# Patient Record
Sex: FEMALE | Race: WHITE | NOT HISPANIC OR LATINO | Employment: UNEMPLOYED | URBAN - METROPOLITAN AREA
[De-identification: names, ages, dates, MRNs, and addresses within clinical notes are randomized per-mention and may not be internally consistent; named-entity substitution may affect disease eponyms.]

---

## 2017-04-08 ENCOUNTER — HOSPITAL ENCOUNTER (EMERGENCY)
Facility: HOSPITAL | Age: 57
Discharge: HOME | End: 2017-04-08
Attending: EMERGENCY MEDICINE
Payer: COMMERCIAL

## 2017-04-08 VITALS
BODY MASS INDEX: 42.04 KG/M2 | HEART RATE: 98 BPM | SYSTOLIC BLOOD PRESSURE: 134 MMHG | TEMPERATURE: 98.6 F | RESPIRATION RATE: 18 BRPM | OXYGEN SATURATION: 96 % | DIASTOLIC BLOOD PRESSURE: 91 MMHG | WEIGHT: 222.66 LBS | HEIGHT: 61 IN

## 2017-04-08 DIAGNOSIS — L73.9 FOLLICULITIS: Primary | ICD-10-CM

## 2017-04-08 PROCEDURE — A9270 NON-COVERED ITEM OR SERVICE: HCPCS | Performed by: PHYSICIAN ASSISTANT

## 2017-04-08 PROCEDURE — 99282 EMERGENCY DEPT VISIT SF MDM: CPT

## 2017-04-08 RX ORDER — CEPHALEXIN 500 MG/1
500 CAPSULE ORAL 4 TIMES DAILY
Qty: 40 CAPSULE | Refills: 0 | Status: SHIPPED | OUTPATIENT
Start: 2017-04-08 | End: 2017-04-18

## 2017-04-08 RX ORDER — CEPHALEXIN 500 MG/1
500 CAPSULE ORAL ONCE
Status: COMPLETED | OUTPATIENT
Start: 2017-04-08 | End: 2017-04-08

## 2017-04-08 RX ADMIN — CEPHALEXIN 500 MG: 500 CAPSULE ORAL at 09:31

## 2017-06-21 ENCOUNTER — ALLSCRIPTS OFFICE VISIT (OUTPATIENT)
Dept: OTHER | Facility: OTHER | Age: 57
End: 2017-06-21

## 2017-06-21 DIAGNOSIS — M12.812 OTHER SPECIFIC ARTHROPATHIES, NOT ELSEWHERE CLASSIFIED, LEFT SHOULDER: ICD-10-CM

## 2017-06-21 DIAGNOSIS — M25.512 PAIN IN LEFT SHOULDER: ICD-10-CM

## 2017-06-28 ENCOUNTER — TRANSCRIBE ORDERS (OUTPATIENT)
Dept: MRI IMAGING | Facility: CLINIC | Age: 57
End: 2017-06-28

## 2017-06-28 ENCOUNTER — APPOINTMENT (OUTPATIENT)
Dept: RADIOLOGY | Facility: CLINIC | Age: 57
End: 2017-06-28
Payer: OTHER MISCELLANEOUS

## 2017-06-28 DIAGNOSIS — M12.812 OTHER SPECIFIC ARTHROPATHIES, NOT ELSEWHERE CLASSIFIED, LEFT SHOULDER: ICD-10-CM

## 2017-06-28 DIAGNOSIS — M25.512 PAIN IN LEFT SHOULDER: ICD-10-CM

## 2017-06-28 PROCEDURE — 73030 X-RAY EXAM OF SHOULDER: CPT

## 2018-01-14 VITALS
DIASTOLIC BLOOD PRESSURE: 92 MMHG | BODY MASS INDEX: 40.67 KG/M2 | SYSTOLIC BLOOD PRESSURE: 134 MMHG | HEART RATE: 101 BPM | HEIGHT: 62 IN | WEIGHT: 221 LBS | OXYGEN SATURATION: 97 %

## 2018-09-12 ENCOUNTER — TRANSCRIBE ORDERS (OUTPATIENT)
Dept: CT IMAGING | Facility: CLINIC | Age: 58
End: 2018-09-12

## 2018-09-12 ENCOUNTER — APPOINTMENT (OUTPATIENT)
Dept: LAB | Facility: CLINIC | Age: 58
End: 2018-09-12
Payer: COMMERCIAL

## 2018-09-12 DIAGNOSIS — M13.89 ALLERGIC ARTHRITIS, MULTIPLE SITES: Primary | ICD-10-CM

## 2018-09-12 LAB
ALBUMIN SERPL BCP-MCNC: 3.4 G/DL (ref 3.5–5)
ALP SERPL-CCNC: 147 U/L (ref 46–116)
ALT SERPL W P-5'-P-CCNC: 16 U/L (ref 12–78)
ANION GAP SERPL CALCULATED.3IONS-SCNC: 4 MMOL/L (ref 4–13)
AST SERPL W P-5'-P-CCNC: 12 U/L (ref 5–45)
BACTERIA UR QL AUTO: ABNORMAL /HPF
BILIRUB SERPL-MCNC: 0.41 MG/DL (ref 0.2–1)
BILIRUB UR QL STRIP: NEGATIVE
BUN SERPL-MCNC: 10 MG/DL (ref 5–25)
CALCIUM SERPL-MCNC: 9.2 MG/DL (ref 8.3–10.1)
CHLORIDE SERPL-SCNC: 106 MMOL/L (ref 100–108)
CHOLEST SERPL-MCNC: 195 MG/DL (ref 50–200)
CLARITY UR: ABNORMAL
CO2 SERPL-SCNC: 28 MMOL/L (ref 21–32)
COLOR UR: ABNORMAL
CREAT SERPL-MCNC: 0.77 MG/DL (ref 0.6–1.3)
ERYTHROCYTE [DISTWIDTH] IN BLOOD BY AUTOMATED COUNT: 20.9 % (ref 11.6–15.1)
ERYTHROCYTE [SEDIMENTATION RATE] IN BLOOD: 34 MM/HOUR (ref 0–20)
GFR SERPL CREATININE-BSD FRML MDRD: 87 ML/MIN/1.73SQ M
GLUCOSE P FAST SERPL-MCNC: 101 MG/DL (ref 65–99)
GLUCOSE UR STRIP-MCNC: NEGATIVE MG/DL
HCT VFR BLD AUTO: 38.8 % (ref 34.8–46.1)
HDLC SERPL-MCNC: 53 MG/DL (ref 40–60)
HGB BLD-MCNC: 10.5 G/DL (ref 11.5–15.4)
HGB UR QL STRIP.AUTO: NEGATIVE
HYALINE CASTS #/AREA URNS LPF: ABNORMAL /LPF
KETONES UR STRIP-MCNC: NEGATIVE MG/DL
LDLC SERPL CALC-MCNC: 122 MG/DL (ref 0–100)
LEUKOCYTE ESTERASE UR QL STRIP: ABNORMAL
MCH RBC QN AUTO: 19.7 PG (ref 26.8–34.3)
MCHC RBC AUTO-ENTMCNC: 27.1 G/DL (ref 31.4–37.4)
MCV RBC AUTO: 73 FL (ref 82–98)
NITRITE UR QL STRIP: NEGATIVE
NON-SQ EPI CELLS URNS QL MICRO: ABNORMAL /HPF
NONHDLC SERPL-MCNC: 142 MG/DL
PH UR STRIP.AUTO: 5.5 [PH] (ref 4.5–8)
PLATELET # BLD AUTO: 445 THOUSANDS/UL (ref 149–390)
PMV BLD AUTO: 9.8 FL (ref 8.9–12.7)
POTASSIUM SERPL-SCNC: 4.6 MMOL/L (ref 3.5–5.3)
PROT SERPL-MCNC: 7.6 G/DL (ref 6.4–8.2)
PROT UR STRIP-MCNC: ABNORMAL MG/DL
RBC # BLD AUTO: 5.34 MILLION/UL (ref 3.81–5.12)
RBC #/AREA URNS AUTO: ABNORMAL /HPF
SODIUM SERPL-SCNC: 138 MMOL/L (ref 136–145)
SP GR UR STRIP.AUTO: 1.02 (ref 1–1.03)
TRIGL SERPL-MCNC: 98 MG/DL
UROBILINOGEN UR QL STRIP.AUTO: 0.2 E.U./DL
WBC # BLD AUTO: 9.09 THOUSAND/UL (ref 4.31–10.16)
WBC #/AREA URNS AUTO: ABNORMAL /HPF

## 2018-09-12 PROCEDURE — 80061 LIPID PANEL: CPT | Performed by: FAMILY MEDICINE

## 2018-09-12 PROCEDURE — 81001 URINALYSIS AUTO W/SCOPE: CPT | Performed by: FAMILY MEDICINE

## 2018-09-12 PROCEDURE — 36415 COLL VENOUS BLD VENIPUNCTURE: CPT | Performed by: FAMILY MEDICINE

## 2018-09-12 PROCEDURE — 86618 LYME DISEASE ANTIBODY: CPT | Performed by: FAMILY MEDICINE

## 2018-09-12 PROCEDURE — 85652 RBC SED RATE AUTOMATED: CPT | Performed by: FAMILY MEDICINE

## 2018-09-12 PROCEDURE — 85027 COMPLETE CBC AUTOMATED: CPT

## 2018-09-12 PROCEDURE — 80053 COMPREHEN METABOLIC PANEL: CPT | Performed by: FAMILY MEDICINE

## 2018-09-14 LAB
B BURGDOR IGG SER IA-ACNC: 0.04
B BURGDOR IGM SER IA-ACNC: 0.36

## 2020-08-26 ENCOUNTER — APPOINTMENT (OUTPATIENT)
Dept: RADIOLOGY | Facility: CLINIC | Age: 60
End: 2020-08-26
Payer: COMMERCIAL

## 2020-08-26 ENCOUNTER — OFFICE VISIT (OUTPATIENT)
Dept: OBGYN CLINIC | Facility: CLINIC | Age: 60
End: 2020-08-26
Payer: COMMERCIAL

## 2020-08-26 VITALS
BODY MASS INDEX: 47.01 KG/M2 | WEIGHT: 249 LBS | HEART RATE: 90 BPM | HEIGHT: 61 IN | DIASTOLIC BLOOD PRESSURE: 85 MMHG | SYSTOLIC BLOOD PRESSURE: 128 MMHG

## 2020-08-26 DIAGNOSIS — S42.291A CLOSED FRACTURE OF HEAD OF RIGHT HUMERUS, INITIAL ENCOUNTER: Primary | ICD-10-CM

## 2020-08-26 DIAGNOSIS — S42.291A CLOSED FRACTURE OF HEAD OF RIGHT HUMERUS, INITIAL ENCOUNTER: ICD-10-CM

## 2020-08-26 PROCEDURE — 73030 X-RAY EXAM OF SHOULDER: CPT

## 2020-08-26 PROCEDURE — 99203 OFFICE O/P NEW LOW 30 MIN: CPT | Performed by: FAMILY MEDICINE

## 2020-08-26 NOTE — PROGRESS NOTES
Assessment/Plan:  Assessment/Plan   Diagnoses and all orders for this visit:    Closed fracture of head of right humerus, initial encounter  -     XR shoulder 2+ vw right; Future  -     Ambulatory referral to Orthopedic Surgery; Future      71-year-old right-dominant female right shoulder pain and limited motion following fall injury at home on 08/20/2020  Discussed with patient physical exam, radiographs, impression and plan  X-rays noted for comminuted fracture of the head of the humerus and neck, with increased acromial humeral space  Physical noted for ecchymosis at the anterior aspect the upper arm  She has limited active range of motion of the shoulder due to pain  She also has limited flexion and extension of the elbow due to pain, however there is intact strength with flexion and extension of the elbow  She has normal wrist range of motion and strength, as well as normal right hand  strength  She has normal sensation throughout the upper extremity and intact radial pulse  I discussed with patient that given the finding of increased acromial humeral space it may indicate adequate glenohumeral alignment or concurrent rotator cuff tear  I will refer her to orthopedic surgeon for further evaluation and recommendation of treatment  She may continue with use of arm sling  Subjective:   Patient ID: Ramya Rutherford is a 64 y o  female  Chief Complaint   Patient presents with    Right Shoulder - Pain       71-year-old active right-hand-dominant female presents for evaluation of right shoulder pain and limited motion of onset from fall injury at home on 08/20/2020  She tripped on the stairs and fell forward landing on her right arm  She does not recall exact mechanism injury  She had pain described as sudden onset, generalized to the upper arm and shoulder, throbbing and sharp, non radiating, worse with attempt of movement, and improved with resting and supporting the arm    She presented emergency room where she was assessed to have comminuted fracture of the humeral head and neck  She was placed in shoulder sling and referred to orthopedic care  She was prescribed morphine for pain  She reports that with the arm being immobilized her pain has been tolerable and she has been taking mostly Tylenol  She denies any previous injury to the right shoulder  Shoulder Pain   This is a new problem  The current episode started in the past 7 days  The problem occurs constantly  The problem has been unchanged  Associated symptoms include arthralgias and weakness  Pertinent negatives include no abdominal pain, chest pain, chills, fever, joint swelling, numbness, rash or sore throat  Exacerbated by: Arm movement  She has tried rest, oral narcotics, immobilization and acetaminophen for the symptoms  The treatment provided mild relief  The following portions of the patient's history were reviewed and updated as appropriate: She  has a past medical history of Shoulder fracture  She  has a past surgical history that includes Cholecystectomy and Shoulder surgery (Left)  Her family history includes No Known Problems in her father and mother  She  reports that she has been smoking  She has been smoking about 0 50 packs per day  She has never used smokeless tobacco  She reports that she does not drink alcohol or use drugs  She has No Known Allergies       Review of Systems   Constitutional: Negative for chills and fever  HENT: Negative for sore throat  Eyes: Negative for visual disturbance  Respiratory: Negative for shortness of breath  Cardiovascular: Negative for chest pain  Gastrointestinal: Negative for abdominal pain  Genitourinary: Negative for difficulty urinating  Musculoskeletal: Positive for arthralgias  Negative for joint swelling  Skin: Negative for rash and wound  Neurological: Positive for weakness  Negative for numbness     Hematological: Does not bruise/bleed easily  Psychiatric/Behavioral: Negative for self-injury  Objective:  Vitals:    08/26/20 0924   BP: 128/85   Pulse: 90   Weight: 113 kg (249 lb)   Height: 5' 1" (1 549 m)     Right Hand Exam     Tenderness   The patient is experiencing no tenderness  Range of Motion   The patient has normal right wrist ROM  Muscle Strength   The patient has normal right wrist strength  Other   Sensation: normal  Pulse: present      Right Elbow Exam     Tenderness   The patient is experiencing no tenderness  Range of Motion   Right elbow extension: -60  Flexion: 100   Pronation: normal   Supination: normal     Muscle Strength   Right elbow normal strength: 5/5 strength flexion and extension  Other   Sensation: normal      Right Shoulder Exam     Other   Sensation: normal          Strength/Myotome Testing     Right Wrist/Hand   Normal wrist strength      Physical Exam  Vitals signs and nursing note reviewed  Constitutional:       General: She is not in acute distress  Appearance: She is well-developed  HENT:      Head: Normocephalic  Eyes:      Conjunctiva/sclera: Conjunctivae normal    Neck:      Trachea: No tracheal deviation  Cardiovascular:      Rate and Rhythm: Normal rate  Pulmonary:      Effort: Pulmonary effort is normal  No respiratory distress  Abdominal:      General: There is no distension  Skin:     General: Skin is warm and dry  Neurological:      Mental Status: She is alert and oriented to person, place, and time  Psychiatric:         Behavior: Behavior normal          I have personally reviewed pertinent films in PACS and my interpretation is Comminuted humeral head and neck fracture with increased acromial humeral space

## 2020-08-27 DIAGNOSIS — S42.291A CLOSED FRACTURE OF HEAD OF RIGHT HUMERUS, INITIAL ENCOUNTER: Primary | ICD-10-CM

## 2020-09-08 ENCOUNTER — APPOINTMENT (OUTPATIENT)
Dept: RADIOLOGY | Facility: CLINIC | Age: 60
End: 2020-09-08
Payer: COMMERCIAL

## 2020-09-08 ENCOUNTER — OFFICE VISIT (OUTPATIENT)
Dept: OBGYN CLINIC | Facility: CLINIC | Age: 60
End: 2020-09-08
Payer: COMMERCIAL

## 2020-09-08 VITALS — HEIGHT: 61 IN | WEIGHT: 249 LBS | BODY MASS INDEX: 47.01 KG/M2

## 2020-09-08 DIAGNOSIS — M25.511 ACUTE PAIN OF RIGHT SHOULDER: ICD-10-CM

## 2020-09-08 DIAGNOSIS — S42.291A CLOSED FRACTURE OF HEAD OF RIGHT HUMERUS, INITIAL ENCOUNTER: Primary | ICD-10-CM

## 2020-09-08 PROCEDURE — 23600 CLTX PROX HUMRL FX W/O MNPJ: CPT | Performed by: ORTHOPAEDIC SURGERY

## 2020-09-08 PROCEDURE — 99213 OFFICE O/P EST LOW 20 MIN: CPT | Performed by: ORTHOPAEDIC SURGERY

## 2020-09-08 PROCEDURE — 73030 X-RAY EXAM OF SHOULDER: CPT

## 2020-09-08 NOTE — PROGRESS NOTES
Orthopaedics Office Visit - New Patient Visit    ASSESSMENT/PLAN:    Assessment:   Right comminuted humeral head and neck fracture sustained 8/20/20    Plan:     Imaging reviewed today shows stable comminuted humeral head and neck fracture with improved alignment, callus formation  She will continue in sling, she can come out for pendulum exercises, elbow motion, she is NWB upper extremity  She will also start PT active assisted motion, passive motion exercises as shown by PT  She can continue with tylenol for pain control  See in 3 weeks with imaging, may then advance to active motion  _____________________________________________________  CHIEF COMPLAINT:  No chief complaint on file  SUBJECTIVE:  Angelo Ortega is a 64 y o  female who presents for evaluation of her right shoulder referred by Dr Lan Rubio  Injury fall at home 8/20/20 resulting in comminuted fracture of humeral head and neck  Seen initially by ED following injury and given sling, pain medication  She has been sling at all times  Really has no pain at rest  Ecchymosis in shoulder has resolved  Denies any numbness or tingling in arm  Use tylenol for pain as needed       PAST MEDICAL HISTORY:  Past Medical History:   Diagnosis Date    Shoulder fracture        PAST SURGICAL HISTORY:  Past Surgical History:   Procedure Laterality Date    CHOLECYSTECTOMY      SHOULDER SURGERY Left        FAMILY HISTORY:  Family History   Problem Relation Age of Onset    No Known Problems Mother     No Known Problems Father        SOCIAL HISTORY:  Social History     Tobacco Use    Smoking status: Current Every Day Smoker     Packs/day: 0 50    Smokeless tobacco: Never Used   Substance Use Topics    Alcohol use: No    Drug use: No       MEDICATIONS:    Current Outpatient Medications:     acetaminophen (TYLENOL) 325 mg tablet, Take 975 mg by mouth every 6 (six) hours as needed for mild pain (last dose 8pm last night), Disp: , Rfl:    naproxen (NAPROSYN) 375 mg tablet, Take 1 tablet by mouth 2 (two) times a day with meals, Disp: 20 tablet, Rfl: 0    VITAMIN D, CHOLECALCIFEROL, PO, Take 1 tablet by mouth once a week, Disp: , Rfl:     ALLERGIES:  No Known Allergies    REVIEW OF SYSTEMS:  MSK: right arm  Neuro: normal  Pertinent items are otherwise noted in HPI  A comprehensive review of systems was otherwise negative  LABS:  HgA1c: No results found for: HGBA1C  BMP:   Lab Results   Component Value Date    CALCIUM 9 2 09/12/2018    K 4 6 09/12/2018    CO2 28 09/12/2018     09/12/2018    BUN 10 09/12/2018    CREATININE 0 77 09/12/2018     CBC: No components found for: CBC    _____________________________________________________  PHYSICAL EXAMINATION:  Vital signs: There were no vitals taken for this visit  General: No acute distress, awake and alert  Psychiatric: Mood and affect appear appropriate  HEENT: Trachea Midline, No torticollis, no apparent facial trauma  Cardiovascular: No audible murmurs; Extremities appear perfused  Pulmonary: No audible wheezing or stridor  Skin: No open lesions; see further details (if any) below    MUSCULOSKELETAL EXAMINATION:  Extremities:  RUE:    Presents in sling right arm  No ecchymosis, skin intact  Range of motion not assessed due to fracture  Mild pain in right elbow with stiffness due to sling  Full range of motion of wrist and all fingers  Sensation intact and symmetric to light palpation  Ulnar, radial, axillary nerves intact   Right upper extremity well perfused           _____________________________________________________  STUDIES REVIEWED:  X-ray right shoulder demonstrate comminuted humeral head and neck fracture with improved alignment from previous imaging, callus formation         PROCEDURES PERFORMED:  Fracture / Dislocation Treatment    Date/Time: 9/8/2020 1:05 PM  Performed by: Magali Forrester MD  Authorized by: Magali Forrester MD     Patient Location:  Clinic  Verbal consent obtained?: Yes    Consent given by:  Patient  Patient identity confirmed:  Verbally with patient  Injury location:  Shoulder  Location details:  Right shoulder  Injury type:  Fracture  Fracture type: surgical neck    Neurovascular status: Neurovascularly intact    Manipulation performed?: No    Immobilization:  Sling  Neurovascular status: Neurovascularly intact          Ivet Cedeno

## 2020-09-08 NOTE — LETTER
September 8, 2020     [facility], DO  [address]    Patient: Derik Epps   YOB: 1960   Date of Visit: 9/8/2020       Dear Dr Alfredito Burrell: Thank you for referring Derik Epps to me for evaluation  Below are my notes for this consultation  If you have questions, please do not hesitate to call me  I look forward to following your patient along with you           Sincerely,        Zoltan Willingham MD        CC: No Recipients

## 2020-09-08 NOTE — PATIENT INSTRUCTIONS
Nonweightbearing right upper extremity in sling  Call PT for right shoulder range of motion  Follow up in 3 weeks

## 2020-09-16 ENCOUNTER — EVALUATION (OUTPATIENT)
Dept: PHYSICAL THERAPY | Facility: CLINIC | Age: 60
End: 2020-09-16
Payer: COMMERCIAL

## 2020-09-16 DIAGNOSIS — S42.291D CLOSED FRACTURE OF HEAD OF RIGHT HUMERUS WITH ROUTINE HEALING, SUBSEQUENT ENCOUNTER: ICD-10-CM

## 2020-09-16 PROCEDURE — 97110 THERAPEUTIC EXERCISES: CPT | Performed by: PHYSICAL THERAPIST

## 2020-09-16 PROCEDURE — 97162 PT EVAL MOD COMPLEX 30 MIN: CPT | Performed by: PHYSICAL THERAPIST

## 2020-09-16 NOTE — PROGRESS NOTES
PT Evaluation     Today's date: 2020  Patient name: Emir Rodriguez  : 1960  MRN: [id number]  Referring provider: Jacque Abrams MD  Dx:   Encounter Diagnosis     ICD-10-CM    1  Closed fracture of head of right humerus, initial encounter  S42 291A Ambulatory referral to Physical Therapy                  Assessment  Assessment details: Patient is a 65 y/o female s/p right humerus fracture on 20  Patient has not had surgery and her xray shows healing and callus formation  Patient presents with decreased functional mobility due to increased pain, decreased shoulder and elbow strength, decreased shoulder and elbow ROM, and use of sling associated with humerus fracture  Patient will benefit from skilled physical therapy to address impairment and improve functional mobility  PT needed to allow for return to maximal function and improve quality of life  Impairments: abnormal or restricted ROM, activity intolerance, impaired physical strength, lacks appropriate home exercise program and pain with function  Understanding of Dx/Px/POC: good   Prognosis: good    Goals  STG within 4 weeks:   1  Patient to be independent in HEP  2  Reduce pain by 50% to improve quality of life  3  Improve PROM elevation to 90 degrees within 2 weeks  4  Improve elbow flexion/extension to 140/0 degrees within 4 weeks  LTG within 8 weeks:   1  Patient to be independent in ADLs/IADLs without difficulty  2  Improve shoulder AROM to [facility] by time of discharge  3  Improve shoulder strength to 4/4+ in all planes by time of discharge  Plan  Plan details: Per script:    Evaluate and Treat right humeral head and neck fracture  Passive motion pendulums, active assisted motion to tolerance  2x wk 6 wks    Per physician note: Nonweightbearing right upper extremity in sling  She will continue in sling, she can come out for pendulum exercises, elbow motion, she is NWB upper extremity   She will also start PT active assisted motion, passive motion exercises as shown by PT  See in 3 weeks with imaging, may then advance to active motion  Patient would benefit from: skilled physical therapy and PT eval  Planned modality interventions: cryotherapy, hydrotherapy, unattended electrical stimulation, H-Wave and TENS  Planned therapy interventions: therapeutic training, therapeutic exercise, therapeutic activities, stretching, strengthening, postural training, patient education, neuromuscular re-education, manual therapy, joint mobilization, IADL retraining, activity modification, ADL retraining, ADL training, body mechanics training, flexibility, functional ROM exercises, gait training, graded activity, graded exercise, graded motor, home exercise program and abdominal trunk stabilization  Frequency: 2x week  Duration in weeks: 8  Plan of Care beginning date: 2020  Plan of Care expiration date: 2020  Treatment plan discussed with: patient        Subjective Evaluation    History of Present Illness  Date of onset: 2020  Mechanism of injury: Patient is a 63 y/o female with chief complaints right shoulder pain s/p a fall on 20  She states she tripped on a lip of doorway and she fell forward hitting the wall  She was seen at [facility] ED and then referred to ortho  She was diagnosed with right humerus fracture  She was referred from Dr Sky Chairez to ortho surgeon  Repeat xrays showed healing  She arrives today in sling  She is referred for evaluation and treatment of RUE     Pain  Current pain ratin  At best pain ratin  At worst pain ratin  Quality: needle-like (tightness)  Relieving factors: ice and medications  Progression: improved    Social Support  Steps to enter house: yes  Lives in: multiple-level home  Lives with: spouse and adult children    Employment status: not working  Hand dominance: right  Exercise history: walking   Life stress: low      Diagnostic Tests  X-ray: abnormal (Imaging reviewed today shows stable comminuted humeral head and neck fracture with improved alignment, callus formation  )  Treatments  Current treatment: immobilization  Patient Goals  Patient goals for therapy: independence with ADLs/IADLs  Patient goal:  "I want to get close to the way it was before "         Objective     Active Range of Motion   Left Shoulder   Flexion: 115 degrees   Abduction: 90 degrees     Left Elbow   Normal active range of motion    Right Elbow   Flexion: 125 degrees   Extension: -25 degrees     Additional Active Range of Motion Details  Reverse total shoulder on left shoulder- patient reports functional movement of left shoulder in all planes    Right AROM deferred secondary to fracture         Passive Range of Motion     Right Shoulder   Flexion: 45 degrees   Abduction: 55 degrees     Strength/Myotome Testing     Left Elbow   Flexion: 5  Extension: 5    Additional Strength Details  Left shoulder: IR/ER 4-, all others 4+    Right shoulder: deferred secondary to fracture   Right elbow strength deferred secondary to surgery     General Comments:      Shoulder Comments   No numbness/tingling; light touch intact       Flowsheet Rows      Most Recent Value   PT/OT G-Codes   Current Score  45   Projected Score  67             Precautions: h/o L reverse TSA; R humerus fracture; NWB RUE; SLING; AA & PROM ONLY     Increased time spent on patient education with diagnosis, prognosis, goals of therapy, progression of therapy, and plan of care  All questions answered  Patient instructed to call clinic with questions or concerns       Manuals 9/16                                                                Neuro Re-Ed             PSR NV            Scap pinches x20                                                                              Ther Ex             Light PROM- Shoulder abd/flex 8'            Hand gripping instruction in HEP            Elbow flex/ext x20             Wrist flex/ext HEP instruction Pendulums HEP instruction                                                    Ther Activity                                       Gait Training                                       Modalities             CP R shoulder decline

## 2020-09-22 ENCOUNTER — OFFICE VISIT (OUTPATIENT)
Dept: PHYSICAL THERAPY | Facility: CLINIC | Age: 60
End: 2020-09-22
Payer: COMMERCIAL

## 2020-09-22 DIAGNOSIS — S42.291D CLOSED FRACTURE OF HEAD OF RIGHT HUMERUS WITH ROUTINE HEALING, SUBSEQUENT ENCOUNTER: Primary | ICD-10-CM

## 2020-09-22 PROCEDURE — 97112 NEUROMUSCULAR REEDUCATION: CPT

## 2020-09-22 PROCEDURE — 97110 THERAPEUTIC EXERCISES: CPT

## 2020-09-22 NOTE — PROGRESS NOTES
Daily Note     Today's date: 2020  Patient name: Mayuri Alicea  : 1960  MRN: [id number]  Referring provider: Vianca Augustin MD  Dx:   Encounter Diagnosis     ICD-10-CM    1  Closed fracture of head of right humerus with routine healing, subsequent encounter  S42 204D                   Subjective: Pt reports that she is doing well today with minimal complaints of pain when she moves in in certain directions at times  When asked, notes that she is not moving it actively  Objective: See treatment diary below      Assessment: Tolerated treatment well  Pt was able to continue with current POC adding in a few exercises that follow her protocol  Focused mostly on AA and PROM exercises today  A tight end feel was present in both shoulder flexion and abduction but improvements were made post session  Re educated pt on the importance of her HEP with compliance noted  Patient demonstrated fatigue post treatment, exhibited good technique with therapeutic exercises and would benefit from continued PT      Plan: Continue per plan of care  Precautions: h/o L reverse TSA; R humerus fracture; NWB RUE; SLING; AA & PROM ONLY     Increased time spent on patient education with diagnosis, prognosis, goals of therapy, progression of therapy, and plan of care  All questions answered  Patient instructed to call clinic with questions or concerns       Manuals                                                                Neuro Re-Ed             PSR NV x20           Scap pinches x20  x20           Pulley's  5'                                                               Ther Ex             Light PROM- Shoulder abd/flex 8' 10'           Hand gripping instruction in HEP Red digi 20x           Elbow flex/ext x20  x20           Wrist flex/ext HEP instruction 20x ea           Pendulums HEP instruction  20x ea           Supine cane ER  nv           Table slides flx, abd  5" x15 ea Ther Activity                                       Gait Training                                       Modalities             CP R shoulder decline 10'

## 2020-09-28 ENCOUNTER — OFFICE VISIT (OUTPATIENT)
Dept: PHYSICAL THERAPY | Facility: CLINIC | Age: 60
End: 2020-09-28
Payer: COMMERCIAL

## 2020-09-28 DIAGNOSIS — S42.291D CLOSED FRACTURE OF HEAD OF RIGHT HUMERUS WITH ROUTINE HEALING, SUBSEQUENT ENCOUNTER: Primary | ICD-10-CM

## 2020-09-28 PROCEDURE — 97110 THERAPEUTIC EXERCISES: CPT

## 2020-09-28 NOTE — PROGRESS NOTES
Daily Note     Today's date: 2020  Patient name: Erick Del Rosario  : 1960  MRN: [id number]  Referring provider: Yordan Montanez MD  Dx:   Encounter Diagnosis     ICD-10-CM    1  Closed fracture of head of right humerus with routine healing, subsequent encounter  S42 997D                   Subjective: Pt arrives to therapy not donning sling, PT educated pt on compliance  Pt reports soreness after LV  Objective: See treatment diary below      Assessment: Muscle guarding with PROM, ROM increased throughout session  Plan: Continue per plan of care        Precautions: h/o L reverse TSA; R humerus fracture; NWB RUE; SLING; AA & PROM ONLY     I    Manuals                                                               Neuro Re-Ed             PSR NV x20 x20          Scap pinches x20  x20 x20          Pulley's  5' 5'                                                              Ther Ex             Light PROM- Shoulder abd/flex 8' 10' 10'          Hand gripping instruction in HEP Red digi 20x Red digi 20x          Elbow flex/ext x20  x20 x20          Wrist flex/ext HEP instruction 20x ea 20x ea          Pendulums HEP instruction  20x ea 20x ea          Supine cane ER  nv           Table slides flx, abd  5" x15 ea 5" x15 ea                       Ther Activity                                       Gait Training                                       Modalities             CP R shoulder decline 10' 10'

## 2020-10-01 ENCOUNTER — OFFICE VISIT (OUTPATIENT)
Dept: PHYSICAL THERAPY | Facility: CLINIC | Age: 60
End: 2020-10-01
Payer: COMMERCIAL

## 2020-10-01 DIAGNOSIS — S42.291D CLOSED FRACTURE OF HEAD OF RIGHT HUMERUS WITH ROUTINE HEALING, SUBSEQUENT ENCOUNTER: Primary | ICD-10-CM

## 2020-10-01 PROCEDURE — 97112 NEUROMUSCULAR REEDUCATION: CPT | Performed by: PHYSICAL THERAPIST

## 2020-10-01 PROCEDURE — 97010 HOT OR COLD PACKS THERAPY: CPT | Performed by: PHYSICAL THERAPIST

## 2020-10-01 PROCEDURE — 97110 THERAPEUTIC EXERCISES: CPT | Performed by: PHYSICAL THERAPIST

## 2020-10-04 DIAGNOSIS — S42.291A CLOSED FRACTURE OF HEAD OF RIGHT HUMERUS, INITIAL ENCOUNTER: Primary | ICD-10-CM

## 2020-10-05 ENCOUNTER — OFFICE VISIT (OUTPATIENT)
Dept: PHYSICAL THERAPY | Facility: CLINIC | Age: 60
End: 2020-10-05
Payer: COMMERCIAL

## 2020-10-05 DIAGNOSIS — S42.291D CLOSED FRACTURE OF HEAD OF RIGHT HUMERUS WITH ROUTINE HEALING, SUBSEQUENT ENCOUNTER: Primary | ICD-10-CM

## 2020-10-05 PROCEDURE — 97110 THERAPEUTIC EXERCISES: CPT | Performed by: PHYSICAL THERAPIST

## 2020-10-05 PROCEDURE — 97112 NEUROMUSCULAR REEDUCATION: CPT | Performed by: PHYSICAL THERAPIST

## 2020-10-06 ENCOUNTER — OFFICE VISIT (OUTPATIENT)
Dept: OBGYN CLINIC | Facility: CLINIC | Age: 60
End: 2020-10-06

## 2020-10-06 ENCOUNTER — APPOINTMENT (OUTPATIENT)
Dept: RADIOLOGY | Facility: CLINIC | Age: 60
End: 2020-10-06
Payer: COMMERCIAL

## 2020-10-06 VITALS
WEIGHT: 250.2 LBS | TEMPERATURE: 97.4 F | DIASTOLIC BLOOD PRESSURE: 92 MMHG | SYSTOLIC BLOOD PRESSURE: 134 MMHG | RESPIRATION RATE: 20 BRPM | HEIGHT: 61 IN | HEART RATE: 86 BPM | BODY MASS INDEX: 47.24 KG/M2

## 2020-10-06 DIAGNOSIS — S49.91XS RIGHT SHOULDER INJURY, SEQUELA: Primary | ICD-10-CM

## 2020-10-06 DIAGNOSIS — S42.291A CLOSED FRACTURE OF HEAD OF RIGHT HUMERUS, INITIAL ENCOUNTER: ICD-10-CM

## 2020-10-06 DIAGNOSIS — S42.294D OTHER CLOSED NONDISPLACED FRACTURE OF PROXIMAL END OF RIGHT HUMERUS WITH ROUTINE HEALING, SUBSEQUENT ENCOUNTER: ICD-10-CM

## 2020-10-06 PROBLEM — S42.201D CLOSED FRACTURE OF PROXIMAL END OF RIGHT HUMERUS WITH ROUTINE HEALING: Status: ACTIVE | Noted: 2020-10-06

## 2020-10-06 PROCEDURE — 99024 POSTOP FOLLOW-UP VISIT: CPT | Performed by: ORTHOPAEDIC SURGERY

## 2020-10-06 PROCEDURE — 73030 X-RAY EXAM OF SHOULDER: CPT

## 2020-10-08 ENCOUNTER — OFFICE VISIT (OUTPATIENT)
Dept: PHYSICAL THERAPY | Facility: CLINIC | Age: 60
End: 2020-10-08
Payer: COMMERCIAL

## 2020-10-08 DIAGNOSIS — S42.291D CLOSED FRACTURE OF HEAD OF RIGHT HUMERUS WITH ROUTINE HEALING, SUBSEQUENT ENCOUNTER: Primary | ICD-10-CM

## 2020-10-08 PROCEDURE — 97112 NEUROMUSCULAR REEDUCATION: CPT | Performed by: PHYSICAL THERAPIST

## 2020-10-08 PROCEDURE — 97010 HOT OR COLD PACKS THERAPY: CPT | Performed by: PHYSICAL THERAPIST

## 2020-10-08 PROCEDURE — 97110 THERAPEUTIC EXERCISES: CPT | Performed by: PHYSICAL THERAPIST

## 2020-10-12 ENCOUNTER — OFFICE VISIT (OUTPATIENT)
Dept: PHYSICAL THERAPY | Facility: CLINIC | Age: 60
End: 2020-10-12
Payer: COMMERCIAL

## 2020-10-12 DIAGNOSIS — S42.291D CLOSED FRACTURE OF HEAD OF RIGHT HUMERUS WITH ROUTINE HEALING, SUBSEQUENT ENCOUNTER: Primary | ICD-10-CM

## 2020-10-12 PROCEDURE — 97110 THERAPEUTIC EXERCISES: CPT | Performed by: PHYSICAL THERAPIST

## 2020-10-12 PROCEDURE — 97010 HOT OR COLD PACKS THERAPY: CPT | Performed by: PHYSICAL THERAPIST

## 2020-10-12 PROCEDURE — 97112 NEUROMUSCULAR REEDUCATION: CPT | Performed by: PHYSICAL THERAPIST

## 2020-10-15 ENCOUNTER — OFFICE VISIT (OUTPATIENT)
Dept: PHYSICAL THERAPY | Facility: CLINIC | Age: 60
End: 2020-10-15
Payer: COMMERCIAL

## 2020-10-15 DIAGNOSIS — S42.291D CLOSED FRACTURE OF HEAD OF RIGHT HUMERUS WITH ROUTINE HEALING, SUBSEQUENT ENCOUNTER: Primary | ICD-10-CM

## 2020-10-15 PROCEDURE — 97112 NEUROMUSCULAR REEDUCATION: CPT | Performed by: PHYSICAL THERAPIST

## 2020-10-15 PROCEDURE — 97110 THERAPEUTIC EXERCISES: CPT | Performed by: PHYSICAL THERAPIST

## 2020-10-19 ENCOUNTER — EVALUATION (OUTPATIENT)
Dept: PHYSICAL THERAPY | Facility: CLINIC | Age: 60
End: 2020-10-19
Payer: COMMERCIAL

## 2020-10-19 DIAGNOSIS — S42.291D CLOSED FRACTURE OF HEAD OF RIGHT HUMERUS WITH ROUTINE HEALING, SUBSEQUENT ENCOUNTER: Primary | ICD-10-CM

## 2020-10-19 PROCEDURE — 97112 NEUROMUSCULAR REEDUCATION: CPT | Performed by: PHYSICAL THERAPIST

## 2020-10-19 PROCEDURE — 97110 THERAPEUTIC EXERCISES: CPT | Performed by: PHYSICAL THERAPIST

## 2020-10-21 ENCOUNTER — OFFICE VISIT (OUTPATIENT)
Dept: PHYSICAL THERAPY | Facility: CLINIC | Age: 60
End: 2020-10-21
Payer: COMMERCIAL

## 2020-10-21 DIAGNOSIS — S42.291D CLOSED FRACTURE OF HEAD OF RIGHT HUMERUS WITH ROUTINE HEALING, SUBSEQUENT ENCOUNTER: Primary | ICD-10-CM

## 2020-10-21 PROCEDURE — 97112 NEUROMUSCULAR REEDUCATION: CPT | Performed by: PHYSICAL THERAPIST

## 2020-10-21 PROCEDURE — 97110 THERAPEUTIC EXERCISES: CPT | Performed by: PHYSICAL THERAPIST

## 2020-10-26 ENCOUNTER — OFFICE VISIT (OUTPATIENT)
Dept: PHYSICAL THERAPY | Facility: CLINIC | Age: 60
End: 2020-10-26
Payer: COMMERCIAL

## 2020-10-26 DIAGNOSIS — S42.291D CLOSED FRACTURE OF HEAD OF RIGHT HUMERUS WITH ROUTINE HEALING, SUBSEQUENT ENCOUNTER: Primary | ICD-10-CM

## 2020-10-26 PROCEDURE — 97112 NEUROMUSCULAR REEDUCATION: CPT | Performed by: PHYSICAL THERAPIST

## 2020-10-26 PROCEDURE — 97110 THERAPEUTIC EXERCISES: CPT | Performed by: PHYSICAL THERAPIST

## 2020-10-29 ENCOUNTER — OFFICE VISIT (OUTPATIENT)
Dept: PHYSICAL THERAPY | Facility: CLINIC | Age: 60
End: 2020-10-29
Payer: COMMERCIAL

## 2020-10-29 DIAGNOSIS — S42.291D CLOSED FRACTURE OF HEAD OF RIGHT HUMERUS WITH ROUTINE HEALING, SUBSEQUENT ENCOUNTER: Primary | ICD-10-CM

## 2020-10-29 PROCEDURE — 97110 THERAPEUTIC EXERCISES: CPT | Performed by: PHYSICAL THERAPIST

## 2020-10-29 PROCEDURE — 97112 NEUROMUSCULAR REEDUCATION: CPT | Performed by: PHYSICAL THERAPIST

## 2020-11-03 ENCOUNTER — OFFICE VISIT (OUTPATIENT)
Dept: PHYSICAL THERAPY | Facility: CLINIC | Age: 60
End: 2020-11-03
Payer: COMMERCIAL

## 2020-11-03 DIAGNOSIS — S42.291D CLOSED FRACTURE OF HEAD OF RIGHT HUMERUS WITH ROUTINE HEALING, SUBSEQUENT ENCOUNTER: Primary | ICD-10-CM

## 2020-11-03 PROCEDURE — 97110 THERAPEUTIC EXERCISES: CPT | Performed by: PHYSICAL THERAPIST

## 2020-11-03 PROCEDURE — 97112 NEUROMUSCULAR REEDUCATION: CPT | Performed by: PHYSICAL THERAPIST

## 2020-11-05 ENCOUNTER — OFFICE VISIT (OUTPATIENT)
Dept: PHYSICAL THERAPY | Facility: CLINIC | Age: 60
End: 2020-11-05
Payer: COMMERCIAL

## 2020-11-05 DIAGNOSIS — S42.291D CLOSED FRACTURE OF HEAD OF RIGHT HUMERUS WITH ROUTINE HEALING, SUBSEQUENT ENCOUNTER: Primary | ICD-10-CM

## 2020-11-05 PROCEDURE — 97112 NEUROMUSCULAR REEDUCATION: CPT | Performed by: PHYSICAL THERAPIST

## 2020-11-05 PROCEDURE — 97110 THERAPEUTIC EXERCISES: CPT | Performed by: PHYSICAL THERAPIST

## 2020-11-10 ENCOUNTER — OFFICE VISIT (OUTPATIENT)
Dept: PHYSICAL THERAPY | Facility: CLINIC | Age: 60
End: 2020-11-10
Payer: COMMERCIAL

## 2020-11-10 DIAGNOSIS — S42.291D CLOSED FRACTURE OF HEAD OF RIGHT HUMERUS WITH ROUTINE HEALING, SUBSEQUENT ENCOUNTER: Primary | ICD-10-CM

## 2020-11-10 PROCEDURE — 97010 HOT OR COLD PACKS THERAPY: CPT | Performed by: PHYSICAL THERAPIST

## 2020-11-10 PROCEDURE — 97112 NEUROMUSCULAR REEDUCATION: CPT | Performed by: PHYSICAL THERAPIST

## 2020-11-10 PROCEDURE — 97110 THERAPEUTIC EXERCISES: CPT | Performed by: PHYSICAL THERAPIST

## 2020-11-12 ENCOUNTER — OFFICE VISIT (OUTPATIENT)
Dept: PHYSICAL THERAPY | Facility: CLINIC | Age: 60
End: 2020-11-12
Payer: COMMERCIAL

## 2020-11-12 DIAGNOSIS — S42.291D CLOSED FRACTURE OF HEAD OF RIGHT HUMERUS WITH ROUTINE HEALING, SUBSEQUENT ENCOUNTER: Primary | ICD-10-CM

## 2020-11-12 PROCEDURE — 97110 THERAPEUTIC EXERCISES: CPT | Performed by: PHYSICAL THERAPIST

## 2020-11-12 PROCEDURE — 97010 HOT OR COLD PACKS THERAPY: CPT | Performed by: PHYSICAL THERAPIST

## 2020-11-12 PROCEDURE — 97112 NEUROMUSCULAR REEDUCATION: CPT | Performed by: PHYSICAL THERAPIST

## 2020-11-15 DIAGNOSIS — S42.294D OTHER CLOSED NONDISPLACED FRACTURE OF PROXIMAL END OF RIGHT HUMERUS WITH ROUTINE HEALING, SUBSEQUENT ENCOUNTER: Primary | ICD-10-CM

## 2020-11-17 ENCOUNTER — APPOINTMENT (OUTPATIENT)
Dept: RADIOLOGY | Facility: CLINIC | Age: 60
End: 2020-11-17
Payer: COMMERCIAL

## 2020-11-17 ENCOUNTER — OFFICE VISIT (OUTPATIENT)
Dept: OBGYN CLINIC | Facility: CLINIC | Age: 60
End: 2020-11-17

## 2020-11-17 VITALS
TEMPERATURE: 98 F | WEIGHT: 251.2 LBS | DIASTOLIC BLOOD PRESSURE: 81 MMHG | HEIGHT: 61 IN | HEART RATE: 91 BPM | BODY MASS INDEX: 47.43 KG/M2 | SYSTOLIC BLOOD PRESSURE: 152 MMHG

## 2020-11-17 DIAGNOSIS — S42.294D OTHER CLOSED NONDISPLACED FRACTURE OF PROXIMAL END OF RIGHT HUMERUS WITH ROUTINE HEALING, SUBSEQUENT ENCOUNTER: ICD-10-CM

## 2020-11-17 DIAGNOSIS — S42.294D OTHER CLOSED NONDISPLACED FRACTURE OF PROXIMAL END OF RIGHT HUMERUS WITH ROUTINE HEALING, SUBSEQUENT ENCOUNTER: Primary | ICD-10-CM

## 2020-11-17 PROCEDURE — 99024 POSTOP FOLLOW-UP VISIT: CPT | Performed by: ORTHOPAEDIC SURGERY

## 2020-11-17 PROCEDURE — 73030 X-RAY EXAM OF SHOULDER: CPT

## 2020-11-17 RX ORDER — METHOCARBAMOL 750 MG/1
750 TABLET, FILM COATED ORAL 3 TIMES DAILY
Qty: 42 TABLET | Refills: 0 | Status: SHIPPED | OUTPATIENT
Start: 2020-11-17 | End: 2021-03-01

## 2020-11-18 ENCOUNTER — EVALUATION (OUTPATIENT)
Dept: PHYSICAL THERAPY | Facility: CLINIC | Age: 60
End: 2020-11-18
Payer: COMMERCIAL

## 2020-11-18 DIAGNOSIS — S42.291D CLOSED FRACTURE OF HEAD OF RIGHT HUMERUS WITH ROUTINE HEALING, SUBSEQUENT ENCOUNTER: Primary | ICD-10-CM

## 2020-11-18 PROCEDURE — 97112 NEUROMUSCULAR REEDUCATION: CPT | Performed by: PHYSICAL THERAPIST

## 2020-11-18 PROCEDURE — 97110 THERAPEUTIC EXERCISES: CPT | Performed by: PHYSICAL THERAPIST

## 2020-11-20 ENCOUNTER — OFFICE VISIT (OUTPATIENT)
Dept: PHYSICAL THERAPY | Facility: CLINIC | Age: 60
End: 2020-11-20
Payer: COMMERCIAL

## 2020-11-20 DIAGNOSIS — S42.291D CLOSED FRACTURE OF HEAD OF RIGHT HUMERUS WITH ROUTINE HEALING, SUBSEQUENT ENCOUNTER: Primary | ICD-10-CM

## 2020-11-20 PROCEDURE — 97110 THERAPEUTIC EXERCISES: CPT | Performed by: PHYSICAL THERAPIST

## 2020-11-20 PROCEDURE — 97140 MANUAL THERAPY 1/> REGIONS: CPT | Performed by: PHYSICAL THERAPIST

## 2020-11-20 PROCEDURE — 97112 NEUROMUSCULAR REEDUCATION: CPT | Performed by: PHYSICAL THERAPIST

## 2020-11-24 ENCOUNTER — OFFICE VISIT (OUTPATIENT)
Dept: PHYSICAL THERAPY | Facility: CLINIC | Age: 60
End: 2020-11-24
Payer: COMMERCIAL

## 2020-11-24 DIAGNOSIS — S42.291D CLOSED FRACTURE OF HEAD OF RIGHT HUMERUS WITH ROUTINE HEALING, SUBSEQUENT ENCOUNTER: Primary | ICD-10-CM

## 2020-11-24 PROCEDURE — 97110 THERAPEUTIC EXERCISES: CPT

## 2020-11-24 PROCEDURE — 97112 NEUROMUSCULAR REEDUCATION: CPT

## 2020-11-27 ENCOUNTER — OFFICE VISIT (OUTPATIENT)
Dept: PHYSICAL THERAPY | Facility: CLINIC | Age: 60
End: 2020-11-27
Payer: COMMERCIAL

## 2020-11-27 DIAGNOSIS — S42.291D CLOSED FRACTURE OF HEAD OF RIGHT HUMERUS WITH ROUTINE HEALING, SUBSEQUENT ENCOUNTER: Primary | ICD-10-CM

## 2020-11-27 PROCEDURE — 97110 THERAPEUTIC EXERCISES: CPT | Performed by: PHYSICAL THERAPIST

## 2020-11-27 PROCEDURE — 97140 MANUAL THERAPY 1/> REGIONS: CPT | Performed by: PHYSICAL THERAPIST

## 2020-11-27 PROCEDURE — 97112 NEUROMUSCULAR REEDUCATION: CPT | Performed by: PHYSICAL THERAPIST

## 2020-12-02 ENCOUNTER — OFFICE VISIT (OUTPATIENT)
Dept: PHYSICAL THERAPY | Facility: CLINIC | Age: 60
End: 2020-12-02
Payer: COMMERCIAL

## 2020-12-02 DIAGNOSIS — S42.291D CLOSED FRACTURE OF HEAD OF RIGHT HUMERUS WITH ROUTINE HEALING, SUBSEQUENT ENCOUNTER: Primary | ICD-10-CM

## 2020-12-02 PROCEDURE — 97110 THERAPEUTIC EXERCISES: CPT | Performed by: PHYSICAL THERAPIST

## 2020-12-02 PROCEDURE — 97112 NEUROMUSCULAR REEDUCATION: CPT | Performed by: PHYSICAL THERAPIST

## 2020-12-04 ENCOUNTER — OFFICE VISIT (OUTPATIENT)
Dept: PHYSICAL THERAPY | Facility: CLINIC | Age: 60
End: 2020-12-04
Payer: COMMERCIAL

## 2020-12-04 DIAGNOSIS — S42.291D CLOSED FRACTURE OF HEAD OF RIGHT HUMERUS WITH ROUTINE HEALING, SUBSEQUENT ENCOUNTER: Primary | ICD-10-CM

## 2020-12-04 PROCEDURE — 97112 NEUROMUSCULAR REEDUCATION: CPT | Performed by: PHYSICAL THERAPIST

## 2020-12-04 PROCEDURE — 97110 THERAPEUTIC EXERCISES: CPT | Performed by: PHYSICAL THERAPIST

## 2020-12-07 ENCOUNTER — OFFICE VISIT (OUTPATIENT)
Dept: PHYSICAL THERAPY | Facility: CLINIC | Age: 60
End: 2020-12-07
Payer: COMMERCIAL

## 2020-12-07 DIAGNOSIS — S42.291D CLOSED FRACTURE OF HEAD OF RIGHT HUMERUS WITH ROUTINE HEALING, SUBSEQUENT ENCOUNTER: Primary | ICD-10-CM

## 2020-12-07 PROCEDURE — 97112 NEUROMUSCULAR REEDUCATION: CPT | Performed by: PHYSICAL THERAPIST

## 2020-12-07 PROCEDURE — 97110 THERAPEUTIC EXERCISES: CPT | Performed by: PHYSICAL THERAPIST

## 2020-12-10 ENCOUNTER — OFFICE VISIT (OUTPATIENT)
Dept: PHYSICAL THERAPY | Facility: CLINIC | Age: 60
End: 2020-12-10
Payer: COMMERCIAL

## 2020-12-10 DIAGNOSIS — S42.291D CLOSED FRACTURE OF HEAD OF RIGHT HUMERUS WITH ROUTINE HEALING, SUBSEQUENT ENCOUNTER: Primary | ICD-10-CM

## 2020-12-10 PROCEDURE — 97140 MANUAL THERAPY 1/> REGIONS: CPT

## 2020-12-10 PROCEDURE — 97110 THERAPEUTIC EXERCISES: CPT

## 2020-12-10 PROCEDURE — 97112 NEUROMUSCULAR REEDUCATION: CPT

## 2020-12-14 ENCOUNTER — OFFICE VISIT (OUTPATIENT)
Dept: PHYSICAL THERAPY | Facility: CLINIC | Age: 60
End: 2020-12-14
Payer: COMMERCIAL

## 2020-12-14 DIAGNOSIS — S42.291D CLOSED FRACTURE OF HEAD OF RIGHT HUMERUS WITH ROUTINE HEALING, SUBSEQUENT ENCOUNTER: Primary | ICD-10-CM

## 2020-12-14 PROCEDURE — 97112 NEUROMUSCULAR REEDUCATION: CPT | Performed by: PHYSICAL THERAPIST

## 2020-12-14 PROCEDURE — 97110 THERAPEUTIC EXERCISES: CPT | Performed by: PHYSICAL THERAPIST

## 2020-12-17 ENCOUNTER — APPOINTMENT (OUTPATIENT)
Dept: PHYSICAL THERAPY | Facility: CLINIC | Age: 60
End: 2020-12-17
Payer: COMMERCIAL

## 2020-12-21 ENCOUNTER — OFFICE VISIT (OUTPATIENT)
Dept: PHYSICAL THERAPY | Facility: CLINIC | Age: 60
End: 2020-12-21
Payer: COMMERCIAL

## 2020-12-21 DIAGNOSIS — S42.291D CLOSED FRACTURE OF HEAD OF RIGHT HUMERUS WITH ROUTINE HEALING, SUBSEQUENT ENCOUNTER: Primary | ICD-10-CM

## 2020-12-21 PROCEDURE — 97112 NEUROMUSCULAR REEDUCATION: CPT | Performed by: PHYSICAL THERAPIST

## 2020-12-21 PROCEDURE — 97110 THERAPEUTIC EXERCISES: CPT | Performed by: PHYSICAL THERAPIST

## 2020-12-23 ENCOUNTER — OFFICE VISIT (OUTPATIENT)
Dept: PHYSICAL THERAPY | Facility: CLINIC | Age: 60
End: 2020-12-23
Payer: COMMERCIAL

## 2020-12-23 DIAGNOSIS — S42.291D CLOSED FRACTURE OF HEAD OF RIGHT HUMERUS WITH ROUTINE HEALING, SUBSEQUENT ENCOUNTER: Primary | ICD-10-CM

## 2020-12-23 PROCEDURE — 97110 THERAPEUTIC EXERCISES: CPT | Performed by: PHYSICAL THERAPIST

## 2020-12-23 PROCEDURE — 97112 NEUROMUSCULAR REEDUCATION: CPT | Performed by: PHYSICAL THERAPIST

## 2020-12-23 PROCEDURE — 97140 MANUAL THERAPY 1/> REGIONS: CPT | Performed by: PHYSICAL THERAPIST

## 2020-12-28 ENCOUNTER — APPOINTMENT (OUTPATIENT)
Dept: PHYSICAL THERAPY | Facility: CLINIC | Age: 60
End: 2020-12-28
Payer: COMMERCIAL

## 2020-12-30 ENCOUNTER — OFFICE VISIT (OUTPATIENT)
Dept: PHYSICAL THERAPY | Facility: CLINIC | Age: 60
End: 2020-12-30
Payer: COMMERCIAL

## 2020-12-30 DIAGNOSIS — S42.291D CLOSED FRACTURE OF HEAD OF RIGHT HUMERUS WITH ROUTINE HEALING, SUBSEQUENT ENCOUNTER: Primary | ICD-10-CM

## 2020-12-30 PROCEDURE — 97110 THERAPEUTIC EXERCISES: CPT

## 2020-12-30 PROCEDURE — 97140 MANUAL THERAPY 1/> REGIONS: CPT

## 2020-12-30 PROCEDURE — 97112 NEUROMUSCULAR REEDUCATION: CPT

## 2021-01-04 ENCOUNTER — OFFICE VISIT (OUTPATIENT)
Dept: PHYSICAL THERAPY | Facility: CLINIC | Age: 61
End: 2021-01-04
Payer: COMMERCIAL

## 2021-01-04 DIAGNOSIS — S42.291D CLOSED FRACTURE OF HEAD OF RIGHT HUMERUS WITH ROUTINE HEALING, SUBSEQUENT ENCOUNTER: Primary | ICD-10-CM

## 2021-01-04 PROCEDURE — 97112 NEUROMUSCULAR REEDUCATION: CPT | Performed by: PHYSICAL THERAPIST

## 2021-01-04 PROCEDURE — 97110 THERAPEUTIC EXERCISES: CPT | Performed by: PHYSICAL THERAPIST

## 2021-01-04 NOTE — PROGRESS NOTES
Daily Note     Today's date: 2021  Patient name: Mauricio Luong  : 1960  MRN: [id number]  Referring provider: Ashley Keen MD  Dx:   Encounter Diagnosis     ICD-10-CM    1  Closed fracture of head of right humerus with routine healing, subsequent encounter  S48 114J                   Subjective: Patient states she's no longer getting muscle spasms into her arm  Objective: See treatment diary below    Assessment: Patient with slightly improved AAROM flexion and abduction  She will continue to benefit from progression of AROM, which she has difficulty with due to strength within shoulder  No complaints post session  Plan: Continue per plan of care        Precautions: h/o L reverse TSA; R humerus fracture; WBAT RUE; AROM, including overhead & strengthening allowed     Manuals 12/7 12/10 12/14 12/23 12/30 1/4/21     Active inf glide w therapist [facility]           Alternating Isometrics- ER/IR 4x30" 4x30" 4x30" 2x30" 2x30" 2x30"      STR R ant shoulder    5' 5'                 Neuro Re-Ed           RetroUBE 3' fwd/ 3' bwd 3' fwd/ 3' bwd 4' fwd/ 4' bwd 4' fwd/ 4' bwd 4' fwd/ 4' bwd 3' fwd/3' bwd      TB Row/Ext RTB 2x10 ea RTB 2x10 ea RTB 2x10 ea Red 2x10 ea Red 2x10  ea Green row; red ext 2x10 ea     TB IR/ER Walk outs  NV x10 3 steps x10 ea   Yellow 3 steps x 10 ea     Pulleys 5' 5' 5' 5' 5' 5'     AA supine flexion w PT supine cane 2x10 supine cane 2x10 Supine cane 2x10         Submax isometrics- ext, abd           Side-lying AAROM Abd/ER           Submax isometrics - ER/IR TB NV nv  3"x15 ea 3"x15 ea      AAROM- Chest press w Alt UE  w cane 2x10 w cane 2x10 w cane 2x10   w cane 2x10      BOR           Bent Over Extension           Wall slides w opp UE assist 2x10 2x10 2x10 2x10   2x10 2x10      Ther Ex           Light PROM- Shoulder abd/flex All planes 5' AF, SPT All planes 5' AF  All planes 8' All planes  10' 5'     Supine cane ER w cane 2x10 standing  w cane 2x10 standing  w cane 2x10 standing         Table slides flx, abd 45* table slides flex 2x10 45* table slides flex 2x10 45* table slides flex 2x10   45* table slides flex 2x10     Supine/SL Abd/flex w/ desk            Finger ladder Flex/abd x10 ea Flex/abd x10 ea Flex/abd x10 ea   Flex/abd x 10 ea     Standing cane abd/flex 2x10 ea [id number] x10 ea   2x10 ea     Ther Activity                                 Gait Training                                 Modalities           CP R shoulder 10'   Decline         HP R shoulder    10' 10'

## 2021-01-06 ENCOUNTER — OFFICE VISIT (OUTPATIENT)
Dept: PHYSICAL THERAPY | Facility: CLINIC | Age: 61
End: 2021-01-06
Payer: COMMERCIAL

## 2021-01-06 DIAGNOSIS — S42.291D CLOSED FRACTURE OF HEAD OF RIGHT HUMERUS WITH ROUTINE HEALING, SUBSEQUENT ENCOUNTER: Primary | ICD-10-CM

## 2021-01-06 PROCEDURE — 97110 THERAPEUTIC EXERCISES: CPT | Performed by: PHYSICAL THERAPIST

## 2021-01-06 PROCEDURE — 97112 NEUROMUSCULAR REEDUCATION: CPT | Performed by: PHYSICAL THERAPIST

## 2021-01-06 NOTE — PROGRESS NOTES
Daily Note     Today's date: 2021  Patient name: Ladarius Celaya  : 1960  MRN: [id number]  Referring provider: Patrick Gordillo MD  Dx:   Encounter Diagnosis     ICD-10-CM    1  Closed fracture of head of right humerus with routine healing, subsequent encounter  S43 117W                   Subjective: Patient reports soreness within deltoid  States she has an appointment with physician moved to next week  Objective: See treatment diary below    Assessment: Reached out to referring physician to alert him of patient's current ROM and strength  Physician requested patient see him for sooner follow up, so she called to move her appointment  Patient continues to work on active and active assisted ROM  No complaints post session  Plan: Continue per plan of care  Precautions: h/o L reverse TSA; R humerus fracture; WBAT RUE; AROM, including overhead & strengthening allowed     Increased time spent on patient education this visit    Manuals 12/7 12/10 12/14 12/23 12/30 1/4/21 1/6/21    Active inf glide w therapist MC           Alternating Isometrics- ER/IR 4x30" 4x30" 4x30" 2x30" 2x30" 2x30"  2x30"     STR R ant shoulder    5' 5'                 Neuro Re-Ed           RetroUBE 3' fwd/ 3' bwd 3' fwd/ 3' bwd 4' fwd/ 4' bwd 4' fwd/ 4' bwd 4' fwd/ 4' bwd 3' fwd/3' bwd  3' fwd/3' bwd     TB Row/Ext RTB 2x10 ea RTB 2x10 ea RTB 2x10 ea Red 2x10 ea Red 2x10  ea Green row; red ext 2x10 ea Green row; red ext 2x10 ea    TB IR/ER Walk outs  NV x10 3 steps x10 ea   Yellow 3 steps x 10 ea     Pulleys 5' 5' 5' 5' 5' 5' 3'    AA supine flexion w PT supine cane 2x10 supine cane 2x10 Supine cane 2x10         Supine abduction AAROM w table       2x10     Side-lying AROM Abd w table       2x10     AAROM- Chest press w Alt UE  w cane 2x10 w cane 2x10 w cane 2x10   w cane 2x10  w cane 2x10     BOR           Bent Over Extension           Wall slides w opp UE assist 2x10 2x10 2x10 2x10   2x10 2x10      Ther Ex Light PROM- Shoulder abd/flex All planes 5' AF, SPT All planes 5' AF  All planes 8' All planes  10' 5' 5'    Supine cane ER w cane 2x10 standing  w cane 2x10 standing  w cane 2x10 standing         Table slides flx, abd 45* table slides flex 2x10 45* table slides flex 2x10 45* table slides flex 2x10   45* table slides flex 2x10 45* table slides flex 2x10    Finger ladder Flex/abd x10 ea Flex/abd x10 ea Flex/abd x10 ea   Flex/abd x 10 ea     Standing cane abd/flex 2x10 ea [id number] x10 ea   2x10 ea 2x10 ea    Ther Activity                                 Gait Training                                 Modalities           CP R shoulder 10'   Decline     decline    HP R shoulder    10' 10'

## 2021-01-11 ENCOUNTER — OFFICE VISIT (OUTPATIENT)
Dept: PHYSICAL THERAPY | Facility: CLINIC | Age: 61
End: 2021-01-11
Payer: COMMERCIAL

## 2021-01-11 DIAGNOSIS — S42.291D CLOSED FRACTURE OF HEAD OF RIGHT HUMERUS WITH ROUTINE HEALING, SUBSEQUENT ENCOUNTER: Primary | ICD-10-CM

## 2021-01-11 PROCEDURE — 97112 NEUROMUSCULAR REEDUCATION: CPT | Performed by: PHYSICAL THERAPIST

## 2021-01-11 PROCEDURE — 97110 THERAPEUTIC EXERCISES: CPT | Performed by: PHYSICAL THERAPIST

## 2021-01-11 NOTE — PROGRESS NOTES
Daily Note     Today's date: 2021  Patient name: Drew Snow  : 1960  MRN: [id number]  Referring provider: Jeni Roman MD  Dx:   Encounter Diagnosis     ICD-10-CM    1  Closed fracture of head of right humerus with routine healing, subsequent encounter  S41 [id number]                   Subjective: Patient has nothing new to report  Objective: See treatment diary below    Assessment: Patient to follow up with physician tomorrow  No issues during or post session  She exhibits slightly improved active assisted ROM  Less assist needed during standing elevation, compared to previous sessions  Will await updated orders after physician follow up  Plan: Continue per plan of care        Precautions: h/o L reverse TSA; R humerus fracture; WBAT RUE; AROM, including overhead & strengthening allowed       Manuals 12/7 12/10 12/14 12/23 12/30 1/4/21 1/6/21 1/11   Active inf glide w therapist MC           Alternating Isometrics- ER/IR 4x30" 4x30" 4x30" 2x30" 2x30" 2x30"  2x30"  x30"    STR R ant shoulder    5' 5'                 Neuro Re-Ed           RetroUBE 3' fwd/ 3' bwd 3' fwd/ 3' bwd 4' fwd/ 4' bwd 4' fwd/ 4' bwd 4' fwd/ 4' bwd 3' fwd/3' bwd  3' fwd/3' bwd  3' fwd/3' bwd    TB Row/Ext RTB 2x10 ea RTB 2x10 ea RTB 2x10 ea Red 2x10 ea Red 2x10  ea Green row; red ext 2x10 ea Green row; red ext 2x10 ea Green row; red ext 2x10 ea   TB IR/ER Walk outs  NV x10 3 steps x10 ea   Yellow 3 steps x 10 ea     Pulleys 5' 5' 5' 5' 5' 5' 3' 4'   AA supine flexion w PT supine cane 2x10 supine cane 2x10 Supine cane 2x10         Supine abduction AAROM w table       2x10  2x10   Side-lying AROM Abd w table       2x10  2x10   AAROM- Chest press w Alt UE  w cane 2x10 w cane 2x10 w cane 2x10   w cane 2x10  w cane 2x10  w cane 2x10    BOR           Bent Over Extension           Wall slides w opp UE assist 2x10 2x10 2x10 2x10   2x10 2x10      Ther Ex           Light PROM- Shoulder abd/flex All planes 5' AF, SPT All planes 5' AF  All planes 8' All planes  10' 5' 5' All 10'   Supine cane ER w cane 2x10 standing  w cane 2x10 standing  w cane 2x10 standing         Table slides flx, abd 45* table slides flex 2x10 45* table slides flex 2x10 45* table slides flex 2x10   45* table slides flex 2x10 45* table slides flex 2x10 45* table slides flex 2x10   Finger ladder Flex/abd x10 ea Flex/abd x10 ea Flex/abd x10 ea   Flex/abd x 10 ea     Standing cane abd/flex 2x10 ea [id number] x10 ea   2x10 ea 2x10 ea & ER 2x10 ea w assist for flex   Ther Activity                                 Gait Training                                 Modalities           CP R shoulder 10'   Decline     decline    HP R shoulder    10' 10'   10'

## 2021-01-12 ENCOUNTER — OFFICE VISIT (OUTPATIENT)
Dept: OBGYN CLINIC | Facility: CLINIC | Age: 61
End: 2021-01-12
Payer: COMMERCIAL

## 2021-01-12 ENCOUNTER — APPOINTMENT (OUTPATIENT)
Dept: RADIOLOGY | Facility: CLINIC | Age: 61
End: 2021-01-12
Payer: COMMERCIAL

## 2021-01-12 VITALS — HEIGHT: 61 IN | BODY MASS INDEX: 47.46 KG/M2 | HEART RATE: 62 BPM

## 2021-01-12 DIAGNOSIS — S42.294D OTHER CLOSED NONDISPLACED FRACTURE OF PROXIMAL END OF RIGHT HUMERUS WITH ROUTINE HEALING, SUBSEQUENT ENCOUNTER: Primary | ICD-10-CM

## 2021-01-12 DIAGNOSIS — S42.294D OTHER CLOSED NONDISPLACED FRACTURE OF PROXIMAL END OF RIGHT HUMERUS WITH ROUTINE HEALING, SUBSEQUENT ENCOUNTER: ICD-10-CM

## 2021-01-12 PROCEDURE — 73030 X-RAY EXAM OF SHOULDER: CPT

## 2021-01-12 PROCEDURE — 99213 OFFICE O/P EST LOW 20 MIN: CPT | Performed by: ORTHOPAEDIC SURGERY

## 2021-01-12 NOTE — PATIENT INSTRUCTIONS
- MRI ordered for the right shoulder to evaluate rotator cuff pathology   - continue with PT as directed  - Ice as needed  - discussed possibility of surgical intervention the future pending MRI results    - patient follow-up with Dr Nuria Marvin for discussion of MRI results

## 2021-01-12 NOTE — PROGRESS NOTES
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 , pseudoparalysis of shoulder    Plan:   - MRI ordered for the right shoulder to evaluate rotator cuff pathology   - continue with PT as directed  - Ice as needed  - discussed possibility of surgical intervention the future pending MRI results  - patient follow-up with Dr Herminia Puckett for discussion of MRI results and consideration for shoulder arthroplasty, likely reverse total shoulder      To Do Next Visit:  Follow-up with Dr Herminia Puckett     _____________________________________________________  CHIEF COMPLAINT:  Chief Complaint   Patient presents with    Right Shoulder - Follow-up, Fracture         SUBJECTIVE:  Reza Gauthier is a 64 y o  female who presents to the office 5 months status post proximal humerus fracture  Patient states that she has minimal pain in the shoulder but is concern of generalized weakness with range of motion of the shoulder  Patient states she has been attending physical therapy who dressed the concerns of weakness  Patient states she has been attempting ice the shoulder with mild relief of symptoms  Patient states that she does have pain over the fracture site becomes worse with direct contact and attempted range of motion of the shoulder  Patient denies any new or worsening symptoms  Patient offers no other complaints at this time       PAST MEDICAL HISTORY:  Past Medical History:   Diagnosis Date    Shoulder fracture        PAST SURGICAL HISTORY:  Past Surgical History:   Procedure Laterality Date    CHOLECYSTECTOMY      SHOULDER SURGERY Left        FAMILY HISTORY:  Family History   Problem Relation Age of Onset    No Known Problems Mother     No Known Problems Father        SOCIAL HISTORY:  Social History     Tobacco Use    Smoking status: Current Every Day Smoker     Packs/day: 0 50    Smokeless tobacco: Never Used   Substance Use Topics    Alcohol use: No    Drug use: No       MEDICATIONS:    Current Outpatient Medications:     acetaminophen (TYLENOL) 325 mg tablet, Take 975 mg by mouth every 6 (six) hours as needed for mild pain (last dose 8pm last night), Disp: , Rfl:     VITAMIN D, CHOLECALCIFEROL, PO, Take 1 tablet by mouth once a week, Disp: , Rfl:     methocarbamol (ROBAXIN) 750 mg tablet, Take 1 tablet (750 mg total) by mouth 3 (three) times a day for 14 days As needed for muscle spasms, Disp: 42 tablet, Rfl: 0    ALLERGIES:  No Known Allergies    REVIEW OF SYSTEMS:  MSK: right shoulder weakness  Neuro: N/A   Pertinent items are otherwise noted in HPI  A comprehensive review of systems was otherwise negative  LABS:  HgA1c: No results found for: HGBA1C  BMP:   Lab Results   Component Value Date    CALCIUM 9 2 09/12/2018    K 4 6 09/12/2018    CO2 28 09/12/2018     09/12/2018    BUN 10 09/12/2018    CREATININE 0 77 09/12/2018     CBC: No components found for: CBC    _____________________________________________________  PHYSICAL EXAMINATION:  Vital signs: Pulse 62   Ht 5' 1" (1 549 m)   BMI 47 46 kg/m²   General: No acute distress, awake and alert  Psychiatric: Mood and affect appear appropriate  HEENT: Trachea Midline, No torticollis, no apparent facial trauma  Cardiovascular: No audible murmurs; Extremities appear perfused  Pulmonary: No audible wheezing or stridor  Skin: No open lesions; see further details (if any) below      MUSCULOSKELETAL EXAMINATION:  right shoulder examination:  - the the no acute visible abnormality present in the right upper extremity    Fingers appear to be well-perfused  - no tenderness palpation appreciated in the proximal humerus  - active range of motion of the shoulder:  15° forward flexion, 20° of abduction, 20° of external rotation   - 3+ strength with for flexion and abduction, external rotation and internal rotation   - neurovascularly intact _____________________________________________________  STUDIES REVIEWED:  I personally reviewed the images and interpretation is as follows:  Well-healed proximal humerus fracture and slight malalignment with some degenerative changes in the glenohumeral joint      PROCEDURES PERFORMED:  None performed today     Georgia Case PA-C - assisting    Edda Bruce MD

## 2021-01-13 ENCOUNTER — OFFICE VISIT (OUTPATIENT)
Dept: PHYSICAL THERAPY | Facility: CLINIC | Age: 61
End: 2021-01-13
Payer: COMMERCIAL

## 2021-01-13 DIAGNOSIS — S42.291D CLOSED FRACTURE OF HEAD OF RIGHT HUMERUS WITH ROUTINE HEALING, SUBSEQUENT ENCOUNTER: Primary | ICD-10-CM

## 2021-01-13 PROCEDURE — 97112 NEUROMUSCULAR REEDUCATION: CPT | Performed by: PHYSICAL THERAPIST

## 2021-01-13 PROCEDURE — 97110 THERAPEUTIC EXERCISES: CPT | Performed by: PHYSICAL THERAPIST

## 2021-01-13 NOTE — PROGRESS NOTES
Daily Note     Today's date: 2021  Patient name: Brena Velásquez  : 1960  MRN: [id number]  Referring provider: Salome De Souza MD  Dx:   Encounter Diagnosis     ICD-10-CM    1  Closed fracture of head of right humerus with routine healing, subsequent encounter  S44 232W                   Subjective: Patient states she saw the doctor yesterday and will be getting an MRI next week  Objective: See treatment diary below    Assessment: Per physician, "continue with PT as directed"  Patient to benefit from continuing plan of care  She does well with session without complaints of lasting pain post session  She does exhibit some pain during chest press AAROM and supine cane flexion AAROM, as she is fatigued post session  Less assist by PT during standing cane flexion and abduction  Increased time spent on patient education this visit  All patient's questions answered  She is instructed to follow up with ortho after MRI and to address any concerns/questions at that time  Plan: Continue per plan of care        Precautions: h/o L reverse TSA; R humerus fracture; WBAT RUE; AROM, including overhead & strengthening allowed       Manuals 21   Active inf glide w therapist MC           Alternating Isometrics- ER/IR 2x30"    2x30" 2x30" 2x30"  2x30"  x30"    STR R ant shoulder    5' 5'                 Neuro Re-Ed           RetroUBE 3' fwd/3' bwd   4' fwd/ 4' bwd 4' fwd/ 4' bwd 3' fwd/3' bwd  3' fwd/3' bwd  3' fwd/3' bwd    TB Row/Ext Green row; red ext 2x10 ea   Red 2x10 ea Red 2x10  ea Green row; red ext 2x10 ea Green row; red ext 2x10 ea Green row; red ext 2x10 ea   TB IR/ER Walk outs       Yellow 3 steps x 10 ea     Pulleys 3'   5' 5' 5' 3' 4'   AA supine flexion w PT w cane 2x10           Supine abduction AAROM w table       2x10  2x10   Side-lying AROM Abd w table       2x10  2x10   AAROM- Chest press w Alt UE  w cane 2x10     w cane 2x10  w cane 2x10  w cane 2x10 BOR           Bent Over Extension           Wall slides w opp UE assist    2x10   2x10 2x10      Ther Ex           Light PROM- Shoulder abd/flex All 8'   All planes 8' All planes  10' 5' 5' All 10'   Standing cane ER 2x10           Table slide elevation w 45* angle of table 2x10      45* table slides flex 2x10 45* table slides flex 2x10 45* table slides flex 2x10   Standing cane abd/flex 2x10 w PT assist     2x10 ea 2x10 ea & ER 2x10 ea w assist for flex   Ther Activity                                 Gait Training                                 Modalities           CP R shoulder       decline    HP R shoulder decline   10' 10'   10'

## 2021-01-18 ENCOUNTER — OFFICE VISIT (OUTPATIENT)
Dept: PHYSICAL THERAPY | Facility: CLINIC | Age: 61
End: 2021-01-18
Payer: COMMERCIAL

## 2021-01-18 DIAGNOSIS — S42.291D CLOSED FRACTURE OF HEAD OF RIGHT HUMERUS WITH ROUTINE HEALING, SUBSEQUENT ENCOUNTER: Primary | ICD-10-CM

## 2021-01-18 PROCEDURE — 97112 NEUROMUSCULAR REEDUCATION: CPT | Performed by: PHYSICAL THERAPIST

## 2021-01-18 PROCEDURE — 97110 THERAPEUTIC EXERCISES: CPT | Performed by: PHYSICAL THERAPIST

## 2021-01-18 NOTE — PROGRESS NOTES
Daily Note     Today's date: 2021  Patient name: Alie Mendosa  : 1960  MRN: [id number]  Referring provider: Patria Mcgill MD  Dx:   Encounter Diagnosis     ICD-10-CM    1  Closed fracture of head of right humerus with routine healing, subsequent encounter  S48 212N                   Subjective: Patient has nothing new to report  Objective: See treatment diary below    Assessment: Patient exhibits improved ease with table slide with table elevated to 45 degrees  She completes exercises as listed  She should continue to follow up for MRI and with physician, as scheduled  Plan: Continue per plan of care        Precautions: h/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 reverse TSA; R humerus fracture; WBAT RUE; AROM, including overhead & strengthening allowed       Manuals 21   Active inf glide w therapist MC           Alternating Isometrics- ER/IR 2x30"  x30"  2x30" 2x30" 2x30"  2x30"  x30"    STR R ant shoulder    5' 5'                 Neuro Re-Ed           RetroUBE 3' fwd/3' bwd 4' fwd/ 4' bwd  4' fwd/ 4' bwd 4' fwd/ 4' bwd 3' fwd/3' bwd  3' fwd/3' bwd  3' fwd/3' bwd    TB Row/Ext Green row; red ext 2x10 ea Green row; red ext 2x10 ea  Red 2x10 ea Red 2x10  ea Green row; red ext 2x10 ea Green row; red ext 2x10 ea Green row; red ext 2x10 ea   TB IR/ER Walk outs       Yellow 3 steps x 10 ea     Pulleys 3' 4'  5' 5' 5' 3' 4'   AA supine flexion w PT w cane 2x10           Supine abduction AROM w table  2x10      2x10  2x10   Side-lying AROM Abd w table  2x10      2x10  2x10   AAROM- Chest press w Alt UE  w cane 2x10     w cane 2x10  w cane 2x10  w cane 2x10    BOR           Bent Over Extension           Wall slides w opp UE assist    2x10   2x10 2x10      Ther Ex           Light PROM- Shoulder abd/flex All 8' All 8'  All planes 8' All planes  10' 5' 5' All 10'   Standing cane ER 2x10           Table slide elevation w 45* angle of table 2x10  2x10    45* table slides flex 2x10 45* table slides flex 2x10 45* table slides flex 2x10   Standing cane abd/flex 2x10 w PT assist 2x10 w PT assist    2x10 ea 2x10 ea & ER 2x10 ea w assist for flex   Ther Activity                                 Gait Training                                 Modalities           CP R shoulder       decline    HP R shoulder decline decline  10' 10'   10'

## 2021-01-20 ENCOUNTER — OFFICE VISIT (OUTPATIENT)
Dept: PHYSICAL THERAPY | Facility: CLINIC | Age: 61
End: 2021-01-20
Payer: COMMERCIAL

## 2021-01-20 DIAGNOSIS — S42.291D CLOSED FRACTURE OF HEAD OF RIGHT HUMERUS WITH ROUTINE HEALING, SUBSEQUENT ENCOUNTER: Primary | ICD-10-CM

## 2021-01-20 PROCEDURE — 97112 NEUROMUSCULAR REEDUCATION: CPT | Performed by: PHYSICAL THERAPIST

## 2021-01-20 PROCEDURE — 97110 THERAPEUTIC EXERCISES: CPT | Performed by: PHYSICAL THERAPIST

## 2021-01-20 NOTE — PROGRESS NOTES
Daily Note     Today's date: 2021  Patient name: Susie Silverman  : 1960  MRN: [id number]  Referring provider: Lexi Robertson MD  Dx:   Encounter Diagnosis     ICD-10-CM    1  Closed fracture of head of right humerus with routine healing, subsequent encounter  S42 391D                   Subjective: Patient states she's not having much pain in her shoulder  She reports a plateau with therapy and continued difficulty performing AROM of shoulder  Objective: See treatment diary below    Assessment: Patient performs exercises through tolerable range  She remains challenged with AROM  At this time, due to plateau in therapy, patient will be "on hold" until after MRI and being seen by physician  Patient in agreement with this plan  She will call me after ortho appointment next week  Plan: Patient referred back to ortho, as scheduled  She will call me after ortho appointment next week        Precautions: h/o L reverse TSA; R humerus fracture; WBAT RUE; AROM, including overhead & strengthening allowed       Manuals 21   Active inf glide w therapist MC           Alternating Isometrics- ER/IR 2x30"  x30" X30"     2x30"  x30"    STR R ant shoulder                      Neuro Re-Ed           RetroUBE 3' fwd/3' bwd 4' fwd/ 4' bwd 4' fwd/ 4' bwd    3' fwd/3' bwd  3' fwd/3' bwd    TB Row/Ext Green row; red ext 2x10 ea Green row; red ext 2x10 ea Green row; red ext 2x10 ea    Green row; red ext 2x10 ea Green row; red ext 2x10 ea   Shoulder isometrics- ext, abd, IR, ER   3"x15 ea        Pulleys 3' 4' 4'    3' 4'   AA supine flexion w PT w cane 2x10   2x10         Supine abduction AROM w table  2x10  2x10     2x10  2x10   Side-lying AROM flex w table  2x10      2x10  2x10   AAROM- Chest press w Alt UE  w cane 2x10  w cane w PT assist 2x10     w cane 2x10  w cane 2x10    BOR           Bent Over Extension           Wall slides w opp UE assist   unable        Ther Ex           Light PROM- Shoulder abd/flex All 8' All 8' All 8'    5' All 10'   Standing cane ER 2x10           Table slide elevation w 45* angle of table 2x10  2x10 2x10     45* table slides flex 2x10 45* table slides flex 2x10   Standing cane abd/flex 2x10 w PT assist 2x10 w PT assist     2x10 ea & ER 2x10 ea w assist for flex   Ther Activity                                 Gait Training                                 Modalities           CP R shoulder       decline    HP R shoulder decline decline      10'

## 2021-01-21 ENCOUNTER — HOSPITAL ENCOUNTER (OUTPATIENT)
Dept: MRI IMAGING | Facility: HOSPITAL | Age: 61
Discharge: HOME | End: 2021-01-21
Payer: COMMERCIAL

## 2021-01-21 DIAGNOSIS — S42.294D OTHER CLOSED NONDISPLACED FRACTURE OF PROXIMAL END OF RIGHT HUMERUS WITH ROUTINE HEALING, SUBSEQUENT ENCOUNTER: ICD-10-CM

## 2021-01-21 PROCEDURE — 73221 MRI JOINT UPR EXTREM W/O DYE: CPT

## 2021-01-21 PROCEDURE — G1004 CDSM NDSC: HCPCS

## 2021-01-25 ENCOUNTER — APPOINTMENT (OUTPATIENT)
Dept: PHYSICAL THERAPY | Facility: CLINIC | Age: 61
End: 2021-01-25
Payer: COMMERCIAL

## 2021-01-27 ENCOUNTER — APPOINTMENT (OUTPATIENT)
Dept: PHYSICAL THERAPY | Facility: CLINIC | Age: 61
End: 2021-01-27
Payer: COMMERCIAL

## 2021-01-28 ENCOUNTER — OFFICE VISIT (OUTPATIENT)
Dept: OBGYN CLINIC | Facility: CLINIC | Age: 61
End: 2021-01-28
Payer: COMMERCIAL

## 2021-01-28 VITALS
HEIGHT: 61 IN | BODY MASS INDEX: 46.44 KG/M2 | SYSTOLIC BLOOD PRESSURE: 138 MMHG | WEIGHT: 246 LBS | HEART RATE: 82 BPM | DIASTOLIC BLOOD PRESSURE: 82 MMHG

## 2021-01-28 DIAGNOSIS — S42.201K CLOSED FRACTURE OF PROXIMAL END OF RIGHT HUMERUS WITH NONUNION, UNSPECIFIED FRACTURE MORPHOLOGY, SUBSEQUENT ENCOUNTER: ICD-10-CM

## 2021-01-28 DIAGNOSIS — S42.291A CLOSED FRACTURE OF HEAD OF RIGHT HUMERUS, INITIAL ENCOUNTER: Primary | ICD-10-CM

## 2021-01-28 PROCEDURE — 99213 OFFICE O/P EST LOW 20 MIN: CPT | Performed by: ORTHOPAEDIC SURGERY

## 2021-01-28 NOTE — PROGRESS NOTES
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, unspecified fracture morphology, subsequent encounter  -     CT upper extremity wo contrast right; Future; Expected date: 01/28/2021        64 y o  female presents 5 months s/p fracture of  right proximal humerus  We reviewed her MRI results in office today, which does not demonstrate any full thickness tears of the rotator cuff  I discussed with her that her weakness may be a result of nonunion of her right proximal humerus fracture  I advised her to get a CT scan of the right shoulder to examine further  I will see her back in office once the CT scan is completed to go over treatment options with her  She may continue physical therapy to maintain passive ROM  Chief Complaint     Right shoulder pain     History of the Present Illness     Fabby Holman is a 64 y o  female with right shoulder pain  I am being asked to see her today in consultation with Dr Selena Mcneil  Patient stated that she fell in August 2020 and sustained a  Fracture of the proximal end of her right humerus  She stated that she initially wore a sling for the first 1 5 months  She stated that she has been attending physical therapy since August 2020 with no improvement in ROM  She stated that she had full shoulder ROM prior to the fall  Patient stated that she paused physical therapy appointments until she receives her MRI results today  Patient stated that her motion is limited due to pain  She stated that she takes Tylenol PRN for pain  She stated that she ices the right shoulder at physical therapy with mild relief  She stated that she uses a heating pad at home with mild relief  She is right-hand dominant Patient denied any numbness or tingling  Review of Systems     Review of Systems   Constitutional: Negative for appetite change and unexpected weight change  HENT: Negative for congestion and trouble swallowing  Eyes: Negative for visual disturbance  Respiratory: Negative for cough and shortness of breath  Cardiovascular: Negative for chest pain and palpitations  Gastrointestinal: Negative for nausea and vomiting  Endocrine: Negative for cold intolerance and heat intolerance  Musculoskeletal: Negative for gait problem and myalgias  Skin: Negative for rash  Neurological: Negative for numbness  Physical Exam     /82   Pulse 82   Ht 5' 1" (1 549 m)   Wt 112 kg (246 lb)   BMI 46 48 kg/m²     Right shoulder exam demonstrates active range of motion to 20 degrees forward flexion, 20 degrees abduction, 50 degrees external rotation, and internal rotation to  Greater trochanter  Passive range of motion of forward flexion to 150  There is tenderness present over the proximal humerus  3+ strength testing with flexion, abduction, ER and IR  The patient is neurovascularly intact distally in the extremity including axillary nerve distribution  Eyes:  Anicteric sclerae  Neck:  Supple  Lungs:  Normal respiratory effort  Cardiovascular:  Capillary refill is less than 2 seconds  Skin:  Intact without erythema  Neurologic:  Sensation grossly intact to light touch  Psychiatric:  Mood and affect are appropriate  Data Review     I have personally reviewed pertinent films in PACS, and my interpretation follows:    MRI of the right shoulder performed on 1/21/2021 demonstrates 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       Past Medical History:   Diagnosis Date    Shoulder fracture        Past Surgical History:   Procedure Laterality Date    CHOLECYSTECTOMY      SHOULDER SURGERY Left        No Known Allergies    Current Outpatient Medications on File Prior to Visit   Medication Sig Dispense Refill    acetaminophen (TYLENOL) 325 mg tablet Take 975 mg by mouth every 6 (six) hours as needed for mild pain (last dose 8pm last night)      VITAMIN D, CHOLECALCIFEROL, PO Take 1 tablet by mouth once a week      methocarbamol (ROBAXIN) 750 mg tablet Take 1 tablet (750 mg total) by mouth 3 (three) times a day for 14 days As needed for muscle spasms 42 tablet 0     No current facility-administered medications on file prior to visit          Social History     Tobacco Use    Smoking status: Current Every Day Smoker     Packs/day: 0 50    Smokeless tobacco: Never Used   Substance Use Topics    Alcohol use: No    Drug use: No       Family History   Problem Relation Age of Onset    No Known Problems Mother     No Known Problems Father              Procedures Performed     Procedures            Scribe Attestation    I,:  Mor Wise am acting as a scribe while in the presence of the attending physician :       I,:  Toi Torre DO personally performed the services described in this documentation    as scribed in my presence :

## 2021-02-12 ENCOUNTER — HOSPITAL ENCOUNTER (OUTPATIENT)
Dept: CT IMAGING | Facility: HOSPITAL | Age: 61
Discharge: HOME | End: 2021-02-12
Attending: ORTHOPAEDIC SURGERY
Payer: COMMERCIAL

## 2021-02-12 DIAGNOSIS — S42.291A CLOSED FRACTURE OF HEAD OF RIGHT HUMERUS, INITIAL ENCOUNTER: ICD-10-CM

## 2021-02-12 DIAGNOSIS — S42.201K CLOSED FRACTURE OF PROXIMAL END OF RIGHT HUMERUS WITH NONUNION, UNSPECIFIED FRACTURE MORPHOLOGY, SUBSEQUENT ENCOUNTER: ICD-10-CM

## 2021-02-12 PROCEDURE — 73200 CT UPPER EXTREMITY W/O DYE: CPT

## 2021-02-15 ENCOUNTER — HOSPITAL ENCOUNTER (OUTPATIENT)
Dept: RADIOLOGY | Facility: HOSPITAL | Age: 61
Discharge: HOME | End: 2021-02-15
Attending: ORTHOPAEDIC SURGERY
Payer: COMMERCIAL

## 2021-02-15 ENCOUNTER — LAB (OUTPATIENT)
Dept: LAB | Facility: HOSPITAL | Age: 61
End: 2021-02-15
Attending: ORTHOPAEDIC SURGERY
Payer: COMMERCIAL

## 2021-02-15 ENCOUNTER — OFFICE VISIT (OUTPATIENT)
Dept: OBGYN CLINIC | Facility: CLINIC | Age: 61
End: 2021-02-15
Payer: COMMERCIAL

## 2021-02-15 VITALS
BODY MASS INDEX: 46.44 KG/M2 | RESPIRATION RATE: 20 BRPM | SYSTOLIC BLOOD PRESSURE: 126 MMHG | HEART RATE: 91 BPM | HEIGHT: 61 IN | DIASTOLIC BLOOD PRESSURE: 81 MMHG | WEIGHT: 246 LBS

## 2021-02-15 DIAGNOSIS — S42.291A CLOSED FRACTURE OF HEAD OF RIGHT HUMERUS, INITIAL ENCOUNTER: ICD-10-CM

## 2021-02-15 DIAGNOSIS — Z01.818 PREOP TESTING: Primary | ICD-10-CM

## 2021-02-15 DIAGNOSIS — M62.519 ATROPHY OF DELTOID MUSCLE: ICD-10-CM

## 2021-02-15 DIAGNOSIS — S42.291K OTHER CLOSED DISPLACED FRACTURE OF PROXIMAL END OF RIGHT HUMERUS WITH NONUNION, SUBSEQUENT ENCOUNTER: Primary | ICD-10-CM

## 2021-02-15 DIAGNOSIS — S42.291K OTHER CLOSED DISPLACED FRACTURE OF PROXIMAL END OF RIGHT HUMERUS WITH NONUNION, SUBSEQUENT ENCOUNTER: ICD-10-CM

## 2021-02-15 DIAGNOSIS — S42.291A CLOSED FRACTURE OF HEAD OF RIGHT HUMERUS, INITIAL ENCOUNTER: Primary | ICD-10-CM

## 2021-02-15 DIAGNOSIS — Z01.818 PREOP TESTING: ICD-10-CM

## 2021-02-15 LAB
ABO GROUP BLD: NORMAL
ALBUMIN SERPL BCP-MCNC: 3 G/DL (ref 3.5–5)
ALP SERPL-CCNC: 139 U/L (ref 46–116)
ALT SERPL W P-5'-P-CCNC: 16 U/L (ref 12–78)
ANION GAP SERPL CALCULATED.3IONS-SCNC: 11 MMOL/L (ref 4–13)
APTT PPP: 31 SECONDS (ref 23–37)
AST SERPL W P-5'-P-CCNC: 12 U/L (ref 5–45)
BASOPHILS # BLD AUTO: 0.04 THOUSANDS/ΜL (ref 0–0.1)
BASOPHILS NFR BLD AUTO: 0 % (ref 0–1)
BILIRUB SERPL-MCNC: 0.3 MG/DL (ref 0.2–1)
BLD GP AB SCN SERPL QL: NEGATIVE
BUN SERPL-MCNC: 13 MG/DL (ref 5–25)
CALCIUM ALBUM COR SERPL-MCNC: 10 MG/DL (ref 8.3–10.1)
CALCIUM SERPL-MCNC: 9.2 MG/DL (ref 8.3–10.1)
CHLORIDE SERPL-SCNC: 105 MMOL/L (ref 100–108)
CO2 SERPL-SCNC: 27 MMOL/L (ref 21–32)
CREAT SERPL-MCNC: 0.81 MG/DL (ref 0.6–1.3)
CRP SERPL QL: 25.7 MG/L
EOSINOPHIL # BLD AUTO: 0 THOUSAND/ΜL (ref 0–0.61)
EOSINOPHIL NFR BLD AUTO: 0 % (ref 0–6)
ERYTHROCYTE [DISTWIDTH] IN BLOOD BY AUTOMATED COUNT: 21.2 % (ref 11.6–15.1)
EST. AVERAGE GLUCOSE BLD GHB EST-MCNC: 126 MG/DL
FERRITIN SERPL-MCNC: 20 NG/ML (ref 8–388)
GFR SERPL CREATININE-BSD FRML MDRD: 80 ML/MIN/1.73SQ M
GLUCOSE P FAST SERPL-MCNC: 100 MG/DL (ref 65–99)
HBA1C MFR BLD: 6 %
HCT VFR BLD AUTO: 37 % (ref 34.8–46.1)
HGB BLD-MCNC: 9.8 G/DL (ref 11.5–15.4)
IMM GRANULOCYTES # BLD AUTO: 0.02 THOUSAND/UL (ref 0–0.2)
IMM GRANULOCYTES NFR BLD AUTO: 0 % (ref 0–2)
INR PPP: 1.01 (ref 0.84–1.19)
IRON SATN MFR SERPL: 5 %
IRON SERPL-MCNC: 20 UG/DL (ref 50–170)
LYMPHOCYTES # BLD AUTO: 2.19 THOUSANDS/ΜL (ref 0.6–4.47)
LYMPHOCYTES NFR BLD AUTO: 24 % (ref 14–44)
MCH RBC QN AUTO: 18.4 PG (ref 26.8–34.3)
MCHC RBC AUTO-ENTMCNC: 26.5 G/DL (ref 31.4–37.4)
MCV RBC AUTO: 69 FL (ref 82–98)
MONOCYTES # BLD AUTO: 0.49 THOUSAND/ΜL (ref 0.17–1.22)
MONOCYTES NFR BLD AUTO: 6 % (ref 4–12)
NEUTROPHILS # BLD AUTO: 6.24 THOUSANDS/ΜL (ref 1.85–7.62)
NEUTS SEG NFR BLD AUTO: 70 % (ref 43–75)
NRBC BLD AUTO-RTO: 0 /100 WBCS
PLATELET # BLD AUTO: 467 THOUSANDS/UL (ref 149–390)
PMV BLD AUTO: 9.3 FL (ref 8.9–12.7)
POTASSIUM SERPL-SCNC: 4.3 MMOL/L (ref 3.5–5.3)
PREALB SERPL-MCNC: 19.5 MG/DL (ref 18–40)
PROT SERPL-MCNC: 7.7 G/DL (ref 6.4–8.2)
PROTHROMBIN TIME: 13.5 SECONDS (ref 11.6–14.5)
RBC # BLD AUTO: 5.33 MILLION/UL (ref 3.81–5.12)
RH BLD: NEGATIVE
SODIUM SERPL-SCNC: 143 MMOL/L (ref 136–145)
SPECIMEN EXPIRATION DATE: NORMAL
TIBC SERPL-MCNC: 370 UG/DL (ref 250–450)
WBC # BLD AUTO: 8.98 THOUSAND/UL (ref 4.31–10.16)

## 2021-02-15 PROCEDURE — 85025 COMPLETE CBC W/AUTO DIFF WBC: CPT

## 2021-02-15 PROCEDURE — 82728 ASSAY OF FERRITIN: CPT

## 2021-02-15 PROCEDURE — 86900 BLOOD TYPING SEROLOGIC ABO: CPT

## 2021-02-15 PROCEDURE — 85610 PROTHROMBIN TIME: CPT

## 2021-02-15 PROCEDURE — 83540 ASSAY OF IRON: CPT

## 2021-02-15 PROCEDURE — 83036 HEMOGLOBIN GLYCOSYLATED A1C: CPT

## 2021-02-15 PROCEDURE — 80053 COMPREHEN METABOLIC PANEL: CPT

## 2021-02-15 PROCEDURE — 86901 BLOOD TYPING SEROLOGIC RH(D): CPT

## 2021-02-15 PROCEDURE — 85730 THROMBOPLASTIN TIME PARTIAL: CPT

## 2021-02-15 PROCEDURE — 83550 IRON BINDING TEST: CPT

## 2021-02-15 PROCEDURE — 71046 X-RAY EXAM CHEST 2 VIEWS: CPT

## 2021-02-15 PROCEDURE — 86140 C-REACTIVE PROTEIN: CPT

## 2021-02-15 PROCEDURE — 86850 RBC ANTIBODY SCREEN: CPT

## 2021-02-15 PROCEDURE — 36415 COLL VENOUS BLD VENIPUNCTURE: CPT

## 2021-02-15 PROCEDURE — 99214 OFFICE O/P EST MOD 30 MIN: CPT | Performed by: ORTHOPAEDIC SURGERY

## 2021-02-15 PROCEDURE — 93005 ELECTROCARDIOGRAM TRACING: CPT

## 2021-02-15 PROCEDURE — 84134 ASSAY OF PREALBUMIN: CPT

## 2021-02-15 RX ORDER — CHLORHEXIDINE GLUCONATE 4 G/100ML
SOLUTION TOPICAL DAILY PRN
Status: CANCELLED | OUTPATIENT
Start: 2021-02-15

## 2021-02-15 RX ORDER — CHLORHEXIDINE GLUCONATE 0.12 MG/ML
15 RINSE ORAL ONCE
Status: CANCELLED | OUTPATIENT
Start: 2021-02-15 | End: 2021-02-15

## 2021-02-15 NOTE — PROGRESS NOTES
Patient Name:  Leora Hendricks  MRN:  [id number]    [address] [place], [place] [address]     1  Closed fracture of head of right humerus, initial encounter  -     Ambulatory referral to Physical Therapy; Future  -     Comprehensive metabolic panel; Future  -     Hemoglobin A1C W/EAG Estimation; Future  -     CBC and differential; Future  -     Protime-INR; Future  -     APTT; Future  -     Ambulatory referral to Physical Therapy; Future  -     Case request operating room: ARTHROPLASTY SHOULDER REVERSE; Standing  -     Iron Panel (Includes Iron Saturation, Iron, and TIBC); Future  -     EKG 12 lead; Future  -     XR chest pa & lateral; Future; Expected date: 02/15/2021  -     C-reactive protein; Future  -     Type and screen; Future  -     Case request operating room: ARTHROPLASTY SHOULDER REVERSE    2  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, subsequent encounter  -     Comprehensive metabolic panel; Future  -     Hemoglobin A1C W/EAG Estimation; Future  -     CBC and differential; Future  -     Protime-INR; Future  -     APTT; Future  -     Ambulatory referral to Physical Therapy; Future  -     Case request operating room: ARTHROPLASTY SHOULDER REVERSE; Standing  -     Iron Panel (Includes Iron Saturation, Iron, and TIBC); Future  -     EKG 12 lead; Future  -     XR chest pa & lateral; Future; Expected date: 02/15/2021  -     C-reactive protein; Future  -     Type and screen; Future  -     Case request operating room: ARTHROPLASTY SHOULDER REVERSE      Shayan Arriaga is a pleasant 64year old who is here today for a follow-up evaluation of right proximal humerus fracture nonunion  She is now 6 months s/p initial injury  Upon evaluation and review of CT scan, she has a non-union fracture of her right humerus  She is severely limited with range of motion that has affected her activities of daily living   At today's visit, I went over different treatment options to address the right proximal humerus fracture  Options include:  Continued conservative management with addition bone stimulator along with labs or surgical intervention  Surgery options include open reduction internal fixation of nonunion with bone graft  Augmentation or a reverse shoulder arthroplasty to restore function and decrease her pain  Due to the fact that she has an atrophic nonunion I have discussed that the likelihood she would begin to socialize of healing with conservative management is likely low  She would like to proceed with the reverse shoulder replacement, especially since she has had overall good results with reverse shoulder prosthesis in her left upper extremity  and she is happy with the function that it provides  Risks and benefits were provided to the patient today along with expected recovery time  I did state that she should refrain from smoking to increase her chances of healing properly  Risks of surgery, including but not limited to, infection, neurovascular injury, residual pain, residual loss of function, and fracture were discussed in great detail  The patient understood and agreed  Consent form was signed at today's visit and she will see my  to set up surgery  The patient was also sent for preoperative laboratory testing and medical clearance with the primary care physician  Due to the fact that she has an atrophic nonunion I have also added additional labs of CRP and pre-albumin to further investigate a possible underlying etiology of her nonunion in order to correct any underlying physiologic issue that could impede operative healing  Due to her history of anemia we have also ordered an iron panel  Patient understood and had no further questions  History of the Present Illness   Una Boo is a 64 y o  female who presents here today 6 months s/p right proximal humerus fracture  She previous seen Dr Klever Colon along with physical therapy with no relief   At her last visit, a CT scan was ordered to examine her right shoulder  She notes that her activities of daily living are severely limited due to her limited shoulder range of motion  She notes that she has no pain at rest but will experience pain if she tries to move her right arm  She stated that she takes Tylenol PRN for pain  She stated that she uses a heating pad at home with mild relief  She is right-hand dominant Patient denied any numbness or tingling  Review of Systems     Review of Systems   Constitutional: Negative for appetite change and unexpected weight change  HENT: Negative for congestion and trouble swallowing  Eyes: Negative for visual disturbance  Respiratory: Negative for cough and shortness of breath  Cardiovascular: Negative for chest pain and palpitations  Gastrointestinal: Negative for nausea and vomiting  Endocrine: Negative for cold intolerance and heat intolerance  Genitourinary: Negative  Musculoskeletal: Negative for gait problem and myalgias  Skin: Negative for rash  Allergic/Immunologic: Negative  Neurological: Negative for numbness  Hematological: Negative  Psychiatric/Behavioral: Negative  Physical Exam     /81   Pulse 91   Resp 20   Ht 5' 1" (1 549 m)   Wt 112 kg (246 lb)   BMI 46 48 kg/m²      Right Shoulder Exam  Active range of motion to 0 degrees forward flexion, 10 degrees abduction, 50 degrees external rotation, and internal rotation to SI joint  Passive range of motion to 130 degrees forward flexion and 110 degrees abduction  There is mild tenderness present over the proximal humerus (fracture site)  Empty can testing is deferred  Belly press test is deferred  Lai test is deferred  Deport's test is deferred  Speed's test is deferred  There is a deferred cross-arm adduction test    The patient is neurovascularly intact distally in the extremity including axillary nerve distribution   There is palpable contraction of the deltoid musculature with attempted  Resisted shoulder extension  Left Shoulder Exam  Active range of motion to 120 degrees forward flexion, 80 degrees abduction, 50 degrees external rotation, and internal rotation to T12  The patient is neurovascularly intact distally in the extremity  Data Review     I have personally reviewed pertinent films in PACS, and my interpretation follows  CT scan from 02/20/2021 demonstrates fracture of surgical neck of right humerus with no signs of callus formation or bony bridging across fracture site      Social History     Tobacco Use    Smoking status: Current Every Day Smoker     Packs/day: 0 50    Smokeless tobacco: Never Used   Substance Use Topics    Alcohol use: No    Drug use: No           Procedures   No procedures performed today    Rudy An, DO

## 2021-02-16 LAB
ATRIAL RATE: 93 BPM
P AXIS: 59 DEGREES
PR INTERVAL: 122 MS
QRS AXIS: 18 DEGREES
QRSD INTERVAL: 64 MS
QT INTERVAL: 332 MS
QTC INTERVAL: 412 MS
T WAVE AXIS: 91 DEGREES
VENTRICULAR RATE: 93 BPM

## 2021-02-16 PROCEDURE — 93010 ELECTROCARDIOGRAM REPORT: CPT | Performed by: INTERNAL MEDICINE

## 2021-02-26 ENCOUNTER — OFFICE VISIT (OUTPATIENT)
Dept: INTERNAL MEDICINE CLINIC | Facility: CLINIC | Age: 61
End: 2021-02-26
Payer: COMMERCIAL

## 2021-02-26 VITALS
WEIGHT: 248.8 LBS | BODY MASS INDEX: 46.97 KG/M2 | DIASTOLIC BLOOD PRESSURE: 80 MMHG | HEIGHT: 61 IN | TEMPERATURE: 99 F | SYSTOLIC BLOOD PRESSURE: 140 MMHG | HEART RATE: 70 BPM

## 2021-02-26 DIAGNOSIS — R73.03 PRE-DIABETES: ICD-10-CM

## 2021-02-26 DIAGNOSIS — Z23 ENCOUNTER FOR IMMUNIZATION: ICD-10-CM

## 2021-02-26 DIAGNOSIS — S42.294D OTHER CLOSED NONDISPLACED FRACTURE OF PROXIMAL END OF RIGHT HUMERUS WITH ROUTINE HEALING, SUBSEQUENT ENCOUNTER: ICD-10-CM

## 2021-02-26 DIAGNOSIS — Z12.11 COLON CANCER SCREENING: ICD-10-CM

## 2021-02-26 DIAGNOSIS — D50.8 IRON DEFICIENCY ANEMIA SECONDARY TO INADEQUATE DIETARY IRON INTAKE: ICD-10-CM

## 2021-02-26 DIAGNOSIS — Z01.818 PRE-OP EXAMINATION: Primary | ICD-10-CM

## 2021-02-26 DIAGNOSIS — Z00.00 MEDICARE ANNUAL WELLNESS VISIT, SUBSEQUENT: ICD-10-CM

## 2021-02-26 DIAGNOSIS — Z12.31 BREAST CANCER SCREENING BY MAMMOGRAM: ICD-10-CM

## 2021-02-26 PROCEDURE — 4004F PT TOBACCO SCREEN RCVD TLK: CPT | Performed by: FAMILY MEDICINE

## 2021-02-26 PROCEDURE — 99204 OFFICE O/P NEW MOD 45 MIN: CPT | Performed by: FAMILY MEDICINE

## 2021-02-26 PROCEDURE — G0439 PPPS, SUBSEQ VISIT: HCPCS | Performed by: FAMILY MEDICINE

## 2021-02-26 PROCEDURE — 3725F SCREEN DEPRESSION PERFORMED: CPT | Performed by: FAMILY MEDICINE

## 2021-02-26 PROCEDURE — 90715 TDAP VACCINE 7 YRS/> IM: CPT | Performed by: FAMILY MEDICINE

## 2021-02-26 PROCEDURE — 90471 IMMUNIZATION ADMIN: CPT | Performed by: FAMILY MEDICINE

## 2021-02-26 RX ORDER — QUINIDINE GLUCONATE 324 MG
240 TABLET, EXTENDED RELEASE ORAL DAILY
Qty: 30 TABLET | Refills: 1 | Status: SHIPPED | OUTPATIENT
Start: 2021-02-26 | End: 2021-05-17

## 2021-02-26 NOTE — PROGRESS NOTES
INTERNAL MEDICINE PRE-OPERATIVE EVALUATION  [facility]    NAME: Mauricio Luong  AGE: 64 y o  SEX: female  : 1960     DATE: 2021     Internal Medicine Pre-Operative Evaluation:     Chief Complaint: Pre-operative Evaluation     Surgery: right arthroplasty shoulder reverse   Anticipated Date of Surgery: 3/3/2021  Referring Provider: Chaka Jade DO        History of Present Illness:     Mauricio Luong is a 64 y o  female who presents to the office today for a preoperative consultation at the request of surgeon,SEE ABOVE who plans on performing SEE ABOVE on SEE ABOVE  Planned anesthesia is general  Patient has a bleeding risk of: no recent abnormal bleeding  Patient does not have objections to receiving blood products if needed  Current anti-platelet/anti-coagulation medications that the patient is prescribed includes: none   Assessment of Chronic Conditions:   - Diabetes Mellitus: PRE-DIABETES WITH AN A1C OF 6 0        Assessment of Cardiac Risk:  · Denies unstable or severe angina or MI in the last 6 weeks or history of stent placement in the last year   · Denies decompensated heart failure (e g  New onset heart failure, NYHA functional class IV heart failure, or worsening existing heart failure)  · Denies significant arrhythmias such as high grade AV block, symptomatic ventricular arrhythmia, newly recognized ventricular tachycardia, supraventricular tachycardia with resting heart rate >100, or symptomatic bradycardia  · Denies severe heart valve disease including aortic stenosis or symptomatic mitral stenosis     Exercise Capacity:  · Able to walk 4 blocks without symptoms?: Yes  · Able to walk 2 flights without symptoms?: Yes    Prior Anesthesia Reactions: No     Personal history of venous thromboembolic disease? No    History of steroid use for >2 weeks within last year?  No    STOP-BANG Sleep Apnea Screening Questionnaire: Do you SNORE loudly (louder than talking or loud enough to be heard through closed doors)? Yes = 1 point   Do you often feel TIRED, fatigued, or sleepy during daytime? No = 0 point   Has anyone OBSERVED you stop breathing during your sleep? No = 0 point   Do you have or are you being treated for high blood pressure? No = 0 point   BMI more than 35 kg/m2? Yes = 1 point   AGE over 48years old? Yes = 1 point   NECK circumference > 16 inches (40 cm)? Yes = 1 point   Male GENDER? No = 0 point   TOTAL SCORE 4= INTERMEDIATE risk of ISELA       Review of Systems:     Review of Systems   Constitutional: Negative for fatigue  HENT: Negative for congestion, hearing loss, sinus pressure and tinnitus  Eyes: Positive for visual disturbance (need/uses readers )  Respiratory: Negative for shortness of breath  Cardiovascular: Negative for chest pain and leg swelling  Gastrointestinal: Negative for abdominal pain, constipation, diarrhea, nausea and vomiting  Genitourinary: Negative for difficulty urinating and dysuria  Musculoskeletal: Positive for arthralgias  Negative for gait problem  Neurological: Negative for light-headedness  Psychiatric/Behavioral: Negative for sleep disturbance          Problem List:     Patient Active Problem List   Diagnosis    Closed fracture of proximal end of right humerus with routine healing        Allergies:     No Known Allergies     Current Medications:       Current Outpatient Medications:     acetaminophen (TYLENOL) 325 mg tablet, Take 975 mg by mouth every 6 (six) hours as needed for mild pain (last dose 8pm last night), Disp: , Rfl:     VITAMIN D, CHOLECALCIFEROL, PO, Take 1 tablet by mouth once a week, Disp: , Rfl:     ferrous gluconate (FERGON) 240 (27 FE) MG tablet, Take 1 tablet (240 mg total) by mouth daily, Disp: 30 tablet, Rfl: 1    methocarbamol (ROBAXIN) 750 mg tablet, Take 1 tablet (750 mg total) by mouth 3 (three) times a day for 14 days As needed for muscle spasms (Patient not taking: Reported on 2/26/2021), Disp: 42 tablet, Rfl: 0     Past History:     Past Medical History:   Diagnosis Date    Shoulder fracture         Past Surgical History:   Procedure Laterality Date    CHOLECYSTECTOMY      SHOULDER SURGERY Left         Family History   Problem Relation Age of Onset    No Known Problems Mother     No Known Problems Father         Social History     Socioeconomic History    Marital status: /Civil Union     Spouse name: Not on file    Number of children: Not on file    Years of education: Not on file    Highest education level: Not on file   Occupational History    Not on file   Social Needs    Financial resource strain: Not on file    Food insecurity     Worry: Not on file     Inability: Not on file    Transportation needs     Medical: Not on file     Non-medical: Not on file   Tobacco Use    Smoking status: Current Every Day Smoker     Packs/day: 0 50    Smokeless tobacco: Never Used   Substance and Sexual Activity    Alcohol use: No    Drug use: No    Sexual activity: Not on file   Lifestyle    Physical activity     Days per week: Not on file     Minutes per session: Not on file    Stress: Not on file   Relationships    Social connections     Talks on phone: Not on file     Gets together: Not on file     Attends Anglican service: Not on file     Active member of club or organization: Not on file     Attends meetings of clubs or organizations: Not on file     Relationship status: Not on file    Intimate partner violence     Fear of current or ex partner: Not on file     Emotionally abused: Not on file     Physically abused: Not on file     Forced sexual activity: Not on file   Other Topics Concern    Not on file   Social History Narrative    Not on file        Physical Exam:      /82 (BP Location: Right arm, Patient Position: Sitting) Comment: fgd  Pulse 70   Temp 99 °F (37 2 °C) (Tympanic)   Ht 5' 1" (1 549 m)   Wt 113 kg (248 lb 12 8 oz)   BMI 47 01 kg/m²     Physical Exam  Constitutional:       Appearance: She is obese  She is not ill-appearing  HENT:      Head: Normocephalic and atraumatic  Right Ear: Tympanic membrane and external ear normal       Left Ear: Tympanic membrane and external ear normal       Mouth/Throat:      Mouth: Mucous membranes are moist       Dentition: Abnormal dentition  Pharynx: Oropharynx is clear  No pharyngeal swelling or posterior oropharyngeal erythema  Eyes:      Conjunctiva/sclera: Conjunctivae normal       Pupils: Pupils are equal, round, and reactive to light  Cardiovascular:      Rate and Rhythm: Normal rate and regular rhythm  Heart sounds: No murmur  Pulmonary:      Effort: Pulmonary effort is normal    Abdominal:      General: Bowel sounds are normal       Palpations: Abdomen is soft  Tenderness: There is no abdominal tenderness  Musculoskeletal:      Right lower leg: No edema  Left lower leg: No edema  Skin:     General: Skin is warm and dry  Neurological:      Mental Status: She is alert  Cranial Nerves: No cranial nerve deficit (2-12 tested)  Gait: Gait normal    Psychiatric:         Mood and Affect: Mood normal          Behavior: Behavior normal          Thought Content: Thought content normal            Data:     Pre-operative work-up    Laboratory Results: I have personally reviewed the pertinent laboratory results/reports     EKG: I have personally reviewed pertinent reports  Chest x-ray: I have personally reviewed pertinent reports  Previous cardiopulmonary studies within the past year:  · Echocardiogram: N/A   · Cardiac Catheterization: N/A   · Stress Test: N/A   · Pulmonary Function Testing: N/A        Assessment:     1  Pre-op examination     2  Other closed nondisplaced fracture of proximal end of right humerus with routine healing, subsequent encounter  DXA bone density spine hip and pelvis   3   Encounter for immunization  TDAP VACCINE GREATER THAN OR EQUAL TO 8YO IM   4  Iron deficiency anemia secondary to inadequate dietary iron intake  ferrous gluconate (FERGON) 240 (27 FE) MG tablet   5  Pre-diabetes     6  Colon cancer screening  Ambulatory referral for colonoscopy   7  Screening mammogram, encounter for     8  Breast cancer screening by mammogram  Mammo screening bilateral w 3d & cad        Plan:     64 y o  female with planned right arthroplasty shoulder reverse: with new onset prediabetes and iron deficiency anemia  Will start daily oral iron therapy  Lifestyle modification for pre-diabetes  Medication not indicated this time  Screening studies ordered  Immunization given and well tolerated     Cardiac Risk Estimation: per the Revised Cardiac Risk Index (Circ  [id number], 1999), the patient's risk factors for cardiac complications include NONE , putting her in: RCI RISK CLASS I (0 risk factors, risk of major cardiac compl  appr  0 5%)  1  Further preoperative workup as follows:   - None; no further preoperative work-up is required    2  Medication Management/Recommendations:   - Patient has been instructed to avoid aspirin containing medications or non-steroidal anti-inflammatory drugs for the week preceding surgery  3  Prophylaxis for cardiac events with perioperative beta-blockers: not indicated  4  Patient requires further consultation with: None    Clearance  Patient is CLEARED for surgery without any additional cardiac testing       Red Cruz DO  [facility] [address] [place] [id number]  Phone#  [phone]  Fax#  [phone]

## 2021-02-26 NOTE — PROGRESS NOTES
Assessment and Plan:     Problem List Items Addressed This Visit        Musculoskeletal and Integument    Closed fracture of proximal end of right humerus with routine healing    Relevant Orders    DXA bone density spine hip and pelvis      Other Visit Diagnoses     Pre-op examination    -  Primary    Iron deficiency anemia secondary to inadequate dietary iron intake        Relevant Medications    ferrous gluconate (FERGON) 240 (27 FE) MG tablet    Pre-diabetes        Colon cancer screening        Relevant Orders    Ambulatory referral for colonoscopy    Breast cancer screening by mammogram        Relevant Orders    Mammo screening bilateral w 3d & cad    Encounter for immunization        Relevant Orders    TDAP VACCINE GREATER THAN OR EQUAL TO 8YO IM (Completed)    Medicare annual wellness visit, subsequent               Preventive health issues were discussed with patient, and age appropriate screening tests were ordered as noted in patient's After Visit Summary  Personalized health advice and appropriate referrals for health education or preventive services given if needed, as noted in patient's After Visit Summary       History of Present Illness:     Patient presents for Medicare Annual Wellness visit    Patient Care Team:  Patrick Weldon DO as PCP - General (Family Medicine)     Problem List:     Patient Active Problem List   Diagnosis    Closed fracture of proximal end of right humerus with routine healing      Past Medical and Surgical History:     Past Medical History:   Diagnosis Date    Shoulder fracture      Past Surgical History:   Procedure Laterality Date    CHOLECYSTECTOMY      SHOULDER SURGERY Left       Family History:     Family History   Problem Relation Age of Onset    No Known Problems Mother     No Known Problems Father       Social History:     E-Cigarette/Vaping    E-Cigarette Use Never User      E-Cigarette/Vaping Substances    Nicotine No     THC No     CBD No     Flavoring No     Other No     Unknown No      Social History     Socioeconomic History    Marital status: /Civil Union     Spouse name: None    Number of children: None    Years of education: None    Highest education level: None   Occupational History    None   Social Needs    Financial resource strain: None    Food insecurity     Worry: None     Inability: None    Transportation needs     Medical: None     Non-medical: None   Tobacco Use    Smoking status: Current Every Day Smoker     Packs/day: 0 50    Smokeless tobacco: Never Used   Substance and Sexual Activity    Alcohol use: No    Drug use: No    Sexual activity: None   Lifestyle    Physical activity     Days per week: None     Minutes per session: None    Stress: None   Relationships    Social connections     Talks on phone: None     Gets together: None     Attends Episcopal service: None     Active member of club or organization: None     Attends meetings of clubs or organizations: None     Relationship status: None    Intimate partner violence     Fear of current or ex partner: None     Emotionally abused: None     Physically abused: None     Forced sexual activity: None   Other Topics Concern    None   Social History Narrative    None      Medications and Allergies:     Current Outpatient Medications   Medication Sig Dispense Refill    acetaminophen (TYLENOL) 325 mg tablet Take 975 mg by mouth every 6 (six) hours as needed for mild pain (last dose 8pm last night)      VITAMIN D, CHOLECALCIFEROL, PO Take 1 tablet by mouth once a week      ferrous gluconate (FERGON) 240 (27 FE) MG tablet Take 1 tablet (240 mg total) by mouth daily 30 tablet 1    methocarbamol (ROBAXIN) 750 mg tablet Take 1 tablet (750 mg total) by mouth 3 (three) times a day for 14 days As needed for muscle spasms (Patient not taking: Reported on 2/26/2021) 42 tablet 0     No current facility-administered medications for this visit        No Known Allergies Immunizations:     Immunization History   Administered Date(s) Administered    Influenza Quadrivalent, 6-35 Months IM 1960    Tdap 02/26/2021      Health Maintenance:         Topic Date Due    MAMMOGRAM  1960    HIV Screening  11/24/1977    Cervical Cancer Screening  11/24/1983    Colorectal Cancer Screening  11/24/2012    Hepatitis C Screening  02/26/2022 (Originally 1960)         Topic Date Due    Pneumococcal Vaccine: Pediatrics (0 to 5 Years) and At-Risk Patients (6 to 59 Years) (1 of 1 - PPSV23) 11/24/1968    DTaP,Tdap,and Td Vaccines (1 - Tdap) 11/24/1983    Influenza Vaccine (1) 09/01/2020      Medicare Health Risk Assessment:     /82 (BP Location: Right arm, Patient Position: Sitting) Comment: fgd  Pulse 70   Temp 99 °F (37 2 °C) (Tympanic)   Ht 5' 1" (1 549 m)   Wt 113 kg (248 lb 12 8 oz)   BMI 47 01 kg/m²      Nicolette Camargo is here for her Subsequent Wellness visit  Health Risk Assessment:   Patient rates overall health as fair  Patient feels that their physical health rating is same  Eyesight was rated as same  Hearing was rated as same  Patient feels that their emotional and mental health rating is same  Pain experienced in the last 7 days has been none  Depression Screening:   PHQ-2 Score: 0      Fall Risk Screening: In the past year, patient has experienced: history of falling in past year    Number of falls: 1  Injured during fall?: Yes    Feels unsteady when standing or walking?: No    Worried about falling?: No      Home Safety:  Patient does not have trouble with stairs inside or outside of their home  Patient has working smoke alarms and has working carbon monoxide detector  Nutrition:   Current diet is Regular  Medications:   Patient is currently taking over-the-counter supplements  OTC medications include: see medication list  Patient is able to manage medications       Activities of Daily Living (ADLs)/Instrumental Activities of Daily Living (IADLs):   Walk and transfer into and out of bed and chair?: Yes  Dress and groom yourself?: Yes    Bathe or shower yourself?: Yes    Feed yourself?  Yes  Do your laundry/housekeeping?: Yes  Manage your money, pay your bills and track your expenses?: Yes  Make your own meals?: Yes    Do your own shopping?: Yes    Previous Hospitalizations:   Any hospitalizations or ED visits within the last 12 months?: No      Advance Care Planning:   Living will: Yes    Advanced directive: Yes    Advanced directive counseling given: Yes    End of Life Decisions reviewed with patient: Yes      PREVENTIVE SCREENINGS      Cardiovascular Screening:    General: Screening Current      Diabetes Screening:     General: Screening Current      Colorectal Cancer Screening:     General: Risks and Benefits Discussed    Due for: Colonoscopy - Low Risk      Osteoporosis Screening:    General: Risks and Benefits Discussed    Due for: DXA Axial      Lung Cancer Screening:     General: Screening Not Indicated      Hepatitis C Screening:    General: Risks and Benefits Discussed and Patient Declines    Hep C Screening Accepted: No       Brian Bennett DO

## 2021-02-26 NOTE — PATIENT INSTRUCTIONS
Start your daily iron supplement daily  Iron can make you constipated so you can use any over the counter stool softener to alleviate this issue  Schedule your colonoscopy, dexa bone scan  and mammogram when you are able  Below is information concerning pre-diabetes     Prediabetes   WHAT YOU NEED TO KNOW:   What is prediabetes? Prediabetes is a blood glucose (sugar) level that is higher than normal  It is not high enough to be considered diabetes  Prediabetes increases your risk for type 2 diabetes and heart disease  What increases my risk for prediabetes? · Being overweight or obese, with a body mass index (BMI) of 25 or higher (23 or higher if you are  American)    · Lack of physical activity    · Older age    · Family history of diabetes (parent or sibling)    · A history of heart disease, gestational diabetes, or polycystic ovary syndrome (PCOS)    · High blood pressure or cholesterol levels    · Being [place] American, , , [place] American, or     · In children, having a mother with diabetes or gestational diabetes mellitus (GDM) during the pregnancy    What are the signs and symptoms of prediabetes? Prediabetes may not cause any symptoms  You may be at risk for diabetes if you have darkened skin on your neck, armpits, and elbows  Any of the following symptoms mean you have moved from prediabetes to diabetes type 2:  · More hunger or thirst than usual    · Frequent urination    · Constant exhaustion    · Blurred vision    How is prediabetes diagnosed? Tests can find prediabetes even if no signs or symptoms have started  If tests are negative for prediabetes, they may be repeated every 3 years if the risk stays high  Adults are tested starting at age 39, or before 39 with a high risk for diabetes  Children who are overweight or obese are tested at the start of puberty, or as early as 8years of age   The following tests are used to check for prediabetes:  · An A1c test  shows the average amount of sugar in your blood over the past 2 to 3 months  ? An A1c of 5 6% or lower means you do not have diabetes  ? An A1c of 5 7% to 6 4% means you have prediabetes and are at risk for diabetes  ? An A1c of 6 5% or higher means you have diabetes  · A fasting plasma glucose test  is when your blood sugar level is tested after you have not eaten for 8 hours  · A 2-hour plasma glucose test  starts with a blood sugar level check after you have not eaten for 8 hours  You are then given a glucose drink  Your blood sugar level is checked after 2 hours  How do I prevent or delay type 2 diabetes? Healthy choices work best to delay or prevent type 2 diabetes  You can decrease your risk for type 2 diabetes by choosing the following:  · Get regular physical activity  Physical activity, such as exercise, can help decrease your blood sugar level  It can also help to decrease your risk for heart disease and help you lose weight  Adults should get at least 150 minutes (2 5 hours) of moderate physical activity every week  Spread the amount of activity over at least 3 days a week  Do not skip more than 2 days in a row  Children should get at least 60 minutes of moderate physical activity on most days of the week  Examples of moderate physical activity include brisk walking, running, and swimming  Do not sit for longer than 30 minutes at a time  Work with your healthcare provider to create a plan for physical activity  · Lose weight if you are overweight  A weight loss of 7% of your body weight can help to lower your blood sugar level  Your healthcare provider can tell you what weight is healthy for you  He or she can help you create a weight loss plan  · Eat healthy foods  Eat a variety of fruits and vegetables  Eat whole-grain foods more often  Choose dairy foods, meat, and other protein foods that are low in fat   Eat fewer sweets, such as candy, cookies, regular soda, and sweetened drinks  You can also decrease calories by eating smaller portion sizes  Work with your healthcare provider or dietitian to develop a meal plan that is right for you  · Take medicine as directed  Your healthcare provider may give diabetes medicine if you are at high risk for diabetes  You may also need medicines for high blood pressure and high cholesterol  · Follow up with your healthcare provider as directed  You will need to return every year to get tested for diabetes  · Do not smoke  Smoking may increase your risk for type 2 diabetes  Nicotine can damage blood vessels  Other health conditions, such as lung disease, can develop when you smoke  Do not use e-cigarettes or smokeless tobacco in place of cigarettes or to help you quit  They still contain nicotine  Ask your healthcare provider for information if you currently smoke and need help quitting  When should I call my doctor? · You have more hunger or thirst than usual     · You are urinating more often than usual     · You are always exhausted  · You have blurred vision  · You have questions or concerns about your condition or care  CARE AGREEMENT:   You have the right to help plan your care  Learn about your health condition and how it may be treated  Discuss treatment options with your healthcare providers to decide what care you want to receive  You always have the right to refuse treatment  The above information is an  only  It is not intended as medical advice for individual conditions or treatments  Talk to your doctor, nurse or pharmacist before following any medical regimen to see if it is safe and effective for you  © Copyright [address] Information is for End User's use only and may not be sold, redistributed or otherwise used for commercial purposes   All illustrations and images included in CareNotes® are the copyrighted property of [employer]  or [address] [place], [place] [address]

## 2021-03-01 ENCOUNTER — ANESTHESIA EVENT (OUTPATIENT)
Dept: PERIOP | Facility: HOSPITAL | Age: 61
DRG: 483 | End: 2021-03-01
Payer: COMMERCIAL

## 2021-03-01 ENCOUNTER — PROCEDURE VISIT (OUTPATIENT)
Dept: NEUROLOGY | Facility: CLINIC | Age: 61
End: 2021-03-01
Payer: COMMERCIAL

## 2021-03-01 DIAGNOSIS — M62.519 ATROPHY OF DELTOID MUSCLE: ICD-10-CM

## 2021-03-01 PROCEDURE — 95908 NRV CNDJ TST 3-4 STUDIES: CPT | Performed by: PSYCHIATRY & NEUROLOGY

## 2021-03-01 PROCEDURE — 95886 MUSC TEST DONE W/N TEST COMP: CPT | Performed by: PSYCHIATRY & NEUROLOGY

## 2021-03-01 NOTE — PRE-PROCEDURE INSTRUCTIONS
Pre-Surgery Instructions:   Medication Instructions    ferrous gluconate (FERGON) 240 (27 FE) MG tablet hold day of surgery    VITAMIN D, CHOLECALCIFEROL, PO hold from now till after surgery    My Surgical Experience    The following information was developed to assist you to prepare for your operation  What do I need to do before coming to the hospital?   Arrange for a responsible person to drive you to and from the hospital    Arrange care for your children at home  Children are not allowed in the recovery areas of the hospital   Plan to wear clothing that is easy to put on and take off  If you are having shoulder surgery, wear a shirt that buttons or zippers in the front  Bathing  o Shower the evening before and the morning of your surgery with an antibacterial soap  Please refer to the Pre Op Showering Instructions for Surgery Patients Sheet   o Remove nail polish and all body piercing jewelry  o Do not shave any body part for at least 24 hours before surgery-this includes face, arms, legs and upper body  Food  o Nothing to eat or drink after midnight the night before your surgery  This includes candy and chewing gum  o Exception: If your surgery is after 12:00pm (noon), you may have clear liquids such as 7-Up®, ginger ale, apple or cranberry juice, Jell-O®, water, or clear broth until 8:00 am  o Do not drink milk or juice with pulp on the morning before surgery  o Do not drink alcohol 24 hours before surgery  Medicine  o Follow instructions you received from your surgeon about which medicines you may take on the day of surgery  o If instructed to take medicine on the morning of surgery, take pills with just a small sip of water   Call your prescribing doctor for specific infroamtion on what to do if you take insulin    What should I bring to the hospital?    Bring:  Trisha Mayen or a walker, if you have them, for foot or knee surgery   A list of the daily medicines, vitamins, minerals, herbals and nutritional supplements you take  Include the dosages of medicines and the time you take them each day   Glasses, dentures or hearing aids   Minimal clothing; you will be wearing hospital sleepwear   Photo ID; required to verify your identity   If you have a Living Will or Power of , bring a copy of the documents   If you have an ostomy, bring an extra pouch and any supplies you use    Do not bring   Medicines or inhalers   Money, valuables or jewelry    What other information should I know about the day of surgery?  Notify your surgeons if you develop a cold, sore throat, cough, fever, rash or any other illness   Report to the Ambulatory Surgical/Same Day Surgery Unit   You will be instructed to stop at Registration only if you have not been pre-registered   Inform your  fi they do not stay that they will be asked by the staff to leave a phone number where they can be reached   Be available to be reached before surgery  In the event the operating room schedule changes, you may be asked to come in earlier or later than expected    *It is important to tell your doctor and others involved in your health care if you are taking or have been taking any non-prescription drugs, vitamins, minerals, herbals or other nutritional supplements   Any of these may interact with some food or medicines and cause a reaction

## 2021-03-01 NOTE — PROGRESS NOTES
EMG 1 Limb     Date/Time 3/1/2021 3:45 PM     Performed by  Kate Cintron MD     Authorized by Chuck Botello, DO            EMG RIGHT UPPER EXTREMITY    Motor and sensory conduction studies were performed on the right median, ulnar, axillary as well as radial sensory nerves  The distal motor latencies were normal  The axillary motor action potential amplitude was reduced while the other motor action potential amplitudes were normal  Motor conduction velocities were normal including conduction of the ulnar nerve across the elbow  The right median and ulnar F waves were normal     Right median, radial and ulnar sensory latencies were normal including median palmar stimulation with normal sensory action potential amplitudes  Concentric needle EMG was performed in various distal and proximal muscles of the right upper extremity including  FDI, EDC, brachial radialis, biceps, triceps , deltoid and in the low cervical paraspinal region  There was no evidence of spontaneous activity seen  The compound motor unit potentials were of normal configuration and interference patterns were full or full for effort    IMPRESSION: This is a normal EMG of the right upper extremity, other than the reduced motor potential of the axillary nerve, which may have been related to body habitus  Axonal neuropathy was not supported by needle EMG      MARLEE Mcwilliams

## 2021-03-01 NOTE — PROGRESS NOTES
PT DISCHARGE:     At last visit, patient had been placed on HOLD until after further imaging and referral back to referring physician  She has been scheduled for surgery and has not returned to therapy  Previous treatment cycle to be discharged  Spoke with patient on 3/1/21; she denies having any questions regarding surgery, post-operative care, or next course of treatment  She is aware she will need to be cleared by physician prior to returning to therapy and is aware she needs a new script and new evaluation  She is instructed to call clinic with questions or concerns

## 2021-03-03 ENCOUNTER — HOSPITAL ENCOUNTER (INPATIENT)
Facility: HOSPITAL | Age: 61
LOS: 1 days | Discharge: HOME | DRG: 483 | End: 2021-03-04
Attending: ORTHOPAEDIC SURGERY | Admitting: ORTHOPAEDIC SURGERY
Payer: COMMERCIAL

## 2021-03-03 ENCOUNTER — APPOINTMENT (OUTPATIENT)
Dept: RADIOLOGY | Facility: HOSPITAL | Age: 61
DRG: 483 | End: 2021-03-03
Payer: COMMERCIAL

## 2021-03-03 ENCOUNTER — ANESTHESIA (OUTPATIENT)
Dept: PERIOP | Facility: HOSPITAL | Age: 61
DRG: 483 | End: 2021-03-03
Payer: COMMERCIAL

## 2021-03-03 DIAGNOSIS — R73.03 PRE-DIABETES: ICD-10-CM

## 2021-03-03 DIAGNOSIS — Z96.611 STATUS POST REVERSE TOTAL REPLACEMENT OF RIGHT SHOULDER: ICD-10-CM

## 2021-03-03 DIAGNOSIS — D64.9 ANEMIA, UNSPECIFIED TYPE: Primary | ICD-10-CM

## 2021-03-03 LAB
FLUAV RNA RESP QL NAA+PROBE: NEGATIVE
FLUBV RNA RESP QL NAA+PROBE: NEGATIVE
RSV RNA RESP QL NAA+PROBE: NEGATIVE
SARS-COV-2 RNA RESP QL NAA+PROBE: NEGATIVE

## 2021-03-03 PROCEDURE — C1713 ANCHOR/SCREW BN/BN,TIS/BN: HCPCS | Performed by: ORTHOPAEDIC SURGERY

## 2021-03-03 PROCEDURE — 73030 X-RAY EXAM OF SHOULDER: CPT

## 2021-03-03 PROCEDURE — 99252 IP/OBS CONSLTJ NEW/EST SF 35: CPT | Performed by: INTERNAL MEDICINE

## 2021-03-03 PROCEDURE — NC001 PR NO CHARGE: Performed by: PHYSICIAN ASSISTANT

## 2021-03-03 PROCEDURE — 23472 RECONSTRUCT SHOULDER JOINT: CPT | Performed by: ORTHOPAEDIC SURGERY

## 2021-03-03 PROCEDURE — 23472 RECONSTRUCT SHOULDER JOINT: CPT | Performed by: PHYSICIAN ASSISTANT

## 2021-03-03 PROCEDURE — C1776 JOINT DEVICE (IMPLANTABLE): HCPCS | Performed by: ORTHOPAEDIC SURGERY

## 2021-03-03 PROCEDURE — 0RRJ00Z REPLACEMENT OF RIGHT SHOULDER JOINT WITH REVERSE BALL AND SOCKET SYNTHETIC SUBSTITUTE, OPEN APPROACH: ICD-10-PCS | Performed by: ORTHOPAEDIC SURGERY

## 2021-03-03 PROCEDURE — 0241U HB NFCT DS VIR RESP RNA 4 TRGT: CPT | Performed by: ORTHOPAEDIC SURGERY

## 2021-03-03 DEVICE — IMPLANTABLE DEVICE
Type: IMPLANTABLE DEVICE | Status: FUNCTIONAL
Brand: EQUINOXE

## 2021-03-03 DEVICE — GLENOSPHERE
Type: IMPLANTABLE DEVICE | Site: SHOULDER | Status: FUNCTIONAL
Brand: EQUINOXE

## 2021-03-03 DEVICE — SMARTSET HIGH PERFORMANCE MV MEDIUM VISCOSITY BONE CEMENT 40G
Type: IMPLANTABLE DEVICE | Site: SHOULDER | Status: FUNCTIONAL
Brand: SMARTSET

## 2021-03-03 DEVICE — IMPLANTABLE DEVICE
Type: IMPLANTABLE DEVICE | Site: SHOULDER | Status: FUNCTIONAL
Brand: EQUINOXE

## 2021-03-03 DEVICE — IMPLANTABLE DEVICE: Type: IMPLANTABLE DEVICE | Site: SHOULDER | Status: FUNCTIONAL

## 2021-03-03 DEVICE — GLENOID PLATE
Type: IMPLANTABLE DEVICE | Site: SHOULDER | Status: FUNCTIONAL
Brand: EQUINOXE

## 2021-03-03 DEVICE — PREP IM ENCHANCED TOTAL HIP BONE                                    PREPARATION KIT
Type: IMPLANTABLE DEVICE | Site: SHOULDER | Status: FUNCTIONAL
Brand: PREP-IM

## 2021-03-03 RX ORDER — KETAMINE HYDROCHLORIDE 50 MG/ML
INJECTION, SOLUTION, CONCENTRATE INTRAMUSCULAR; INTRAVENOUS AS NEEDED
Status: DISCONTINUED | OUTPATIENT
Start: 2021-03-03 | End: 2021-03-03

## 2021-03-03 RX ORDER — PROPOFOL 10 MG/ML
INJECTION, EMULSION INTRAVENOUS AS NEEDED
Status: DISCONTINUED | OUTPATIENT
Start: 2021-03-03 | End: 2021-03-03

## 2021-03-03 RX ORDER — MIDAZOLAM HYDROCHLORIDE 2 MG/2ML
INJECTION, SOLUTION INTRAMUSCULAR; INTRAVENOUS
Status: COMPLETED | OUTPATIENT
Start: 2021-03-03 | End: 2021-03-03

## 2021-03-03 RX ORDER — HYDROMORPHONE HCL/PF 1 MG/ML
0.2 SYRINGE (ML) INJECTION EVERY 6 HOURS PRN
Status: DISCONTINUED | OUTPATIENT
Start: 2021-03-03 | End: 2021-03-04 | Stop reason: HOSPADM

## 2021-03-03 RX ORDER — GLYCOPYRROLATE 0.2 MG/ML
INJECTION INTRAMUSCULAR; INTRAVENOUS AS NEEDED
Status: DISCONTINUED | OUTPATIENT
Start: 2021-03-03 | End: 2021-03-03

## 2021-03-03 RX ORDER — OXYCODONE HYDROCHLORIDE 5 MG/1
5 TABLET ORAL EVERY 4 HOURS PRN
Status: DISCONTINUED | OUTPATIENT
Start: 2021-03-03 | End: 2021-03-04 | Stop reason: HOSPADM

## 2021-03-03 RX ORDER — ROPIVACAINE HYDROCHLORIDE 5 MG/ML
INJECTION, SOLUTION EPIDURAL; INFILTRATION; PERINEURAL
Status: COMPLETED | OUTPATIENT
Start: 2021-03-03 | End: 2021-03-03

## 2021-03-03 RX ORDER — MAGNESIUM HYDROXIDE 1200 MG/15ML
LIQUID ORAL AS NEEDED
Status: DISCONTINUED | OUTPATIENT
Start: 2021-03-03 | End: 2021-03-03 | Stop reason: HOSPADM

## 2021-03-03 RX ORDER — SODIUM CHLORIDE, SODIUM LACTATE, POTASSIUM CHLORIDE, CALCIUM CHLORIDE 600; 310; 30; 20 MG/100ML; MG/100ML; MG/100ML; MG/100ML
125 INJECTION, SOLUTION INTRAVENOUS CONTINUOUS
Status: DISCONTINUED | OUTPATIENT
Start: 2021-03-03 | End: 2021-03-04

## 2021-03-03 RX ORDER — DOXYCYCLINE HYCLATE 50 MG/1
324 CAPSULE, GELATIN COATED ORAL
Status: DISCONTINUED | OUTPATIENT
Start: 2021-03-04 | End: 2021-03-04 | Stop reason: HOSPADM

## 2021-03-03 RX ORDER — LIDOCAINE HYDROCHLORIDE 10 MG/ML
INJECTION, SOLUTION EPIDURAL; INFILTRATION; INTRACAUDAL; PERINEURAL
Status: COMPLETED | OUTPATIENT
Start: 2021-03-03 | End: 2021-03-03

## 2021-03-03 RX ORDER — CEFAZOLIN SODIUM 2 G/50ML
2000 SOLUTION INTRAVENOUS ONCE
Status: COMPLETED | OUTPATIENT
Start: 2021-03-03 | End: 2021-03-03

## 2021-03-03 RX ORDER — DEXAMETHASONE SODIUM PHOSPHATE 4 MG/ML
INJECTION, SOLUTION INTRA-ARTICULAR; INTRALESIONAL; INTRAMUSCULAR; INTRAVENOUS; SOFT TISSUE AS NEEDED
Status: DISCONTINUED | OUTPATIENT
Start: 2021-03-03 | End: 2021-03-03

## 2021-03-03 RX ORDER — OXYCODONE HYDROCHLORIDE 10 MG/1
10 TABLET ORAL EVERY 4 HOURS PRN
Status: DISCONTINUED | OUTPATIENT
Start: 2021-03-03 | End: 2021-03-04 | Stop reason: HOSPADM

## 2021-03-03 RX ORDER — ONDANSETRON 2 MG/ML
4 INJECTION INTRAMUSCULAR; INTRAVENOUS ONCE AS NEEDED
Status: DISCONTINUED | OUTPATIENT
Start: 2021-03-03 | End: 2021-03-03 | Stop reason: HOSPADM

## 2021-03-03 RX ORDER — ROCURONIUM BROMIDE 10 MG/ML
INJECTION, SOLUTION INTRAVENOUS AS NEEDED
Status: DISCONTINUED | OUTPATIENT
Start: 2021-03-03 | End: 2021-03-03

## 2021-03-03 RX ORDER — SUCCINYLCHOLINE/SOD CL,ISO/PF 100 MG/5ML
SYRINGE (ML) INTRAVENOUS AS NEEDED
Status: DISCONTINUED | OUTPATIENT
Start: 2021-03-03 | End: 2021-03-03

## 2021-03-03 RX ORDER — HYDROMORPHONE HCL/PF 1 MG/ML
SYRINGE (ML) INJECTION AS NEEDED
Status: DISCONTINUED | OUTPATIENT
Start: 2021-03-03 | End: 2021-03-03

## 2021-03-03 RX ORDER — NEOSTIGMINE METHYLSULFATE 1 MG/ML
INJECTION INTRAVENOUS AS NEEDED
Status: DISCONTINUED | OUTPATIENT
Start: 2021-03-03 | End: 2021-03-03

## 2021-03-03 RX ORDER — FENTANYL CITRATE 50 UG/ML
INJECTION, SOLUTION INTRAMUSCULAR; INTRAVENOUS
Status: COMPLETED | OUTPATIENT
Start: 2021-03-03 | End: 2021-03-03

## 2021-03-03 RX ORDER — METOCLOPRAMIDE HYDROCHLORIDE 5 MG/ML
10 INJECTION INTRAMUSCULAR; INTRAVENOUS ONCE AS NEEDED
Status: DISCONTINUED | OUTPATIENT
Start: 2021-03-03 | End: 2021-03-03 | Stop reason: HOSPADM

## 2021-03-03 RX ORDER — ONDANSETRON 2 MG/ML
INJECTION INTRAMUSCULAR; INTRAVENOUS AS NEEDED
Status: DISCONTINUED | OUTPATIENT
Start: 2021-03-03 | End: 2021-03-03

## 2021-03-03 RX ORDER — VANCOMYCIN HYDROCHLORIDE 1 G/20ML
INJECTION, POWDER, LYOPHILIZED, FOR SOLUTION INTRAVENOUS AS NEEDED
Status: DISCONTINUED | OUTPATIENT
Start: 2021-03-03 | End: 2021-03-03 | Stop reason: HOSPADM

## 2021-03-03 RX ORDER — LIDOCAINE HYDROCHLORIDE 10 MG/ML
INJECTION, SOLUTION EPIDURAL; INFILTRATION; INTRACAUDAL; PERINEURAL AS NEEDED
Status: DISCONTINUED | OUTPATIENT
Start: 2021-03-03 | End: 2021-03-03

## 2021-03-03 RX ORDER — CHLORHEXIDINE GLUCONATE 4 G/100ML
SOLUTION TOPICAL DAILY PRN
Status: DISCONTINUED | OUTPATIENT
Start: 2021-03-03 | End: 2021-03-03 | Stop reason: HOSPADM

## 2021-03-03 RX ORDER — FENTANYL CITRATE/PF 50 MCG/ML
25 SYRINGE (ML) INJECTION
Status: DISCONTINUED | OUTPATIENT
Start: 2021-03-03 | End: 2021-03-03 | Stop reason: HOSPADM

## 2021-03-03 RX ORDER — CEFAZOLIN SODIUM 2 G/50ML
2000 SOLUTION INTRAVENOUS EVERY 8 HOURS
Status: COMPLETED | OUTPATIENT
Start: 2021-03-03 | End: 2021-03-04

## 2021-03-03 RX ORDER — ROPIVACAINE HYDROCHLORIDE 2 MG/ML
INJECTION, SOLUTION EPIDURAL; INFILTRATION; PERINEURAL
Status: COMPLETED | OUTPATIENT
Start: 2021-03-03 | End: 2021-03-03

## 2021-03-03 RX ORDER — ALBUTEROL SULFATE 2.5 MG/3ML
2.5 SOLUTION RESPIRATORY (INHALATION) ONCE AS NEEDED
Status: DISCONTINUED | OUTPATIENT
Start: 2021-03-03 | End: 2021-03-03 | Stop reason: HOSPADM

## 2021-03-03 RX ADMIN — GLYCOPYRROLATE 0.4 MG: 0.2 INJECTION, SOLUTION INTRAMUSCULAR; INTRAVENOUS at 17:37

## 2021-03-03 RX ADMIN — CEFAZOLIN SODIUM 2000 MG: 2 SOLUTION INTRAVENOUS at 19:29

## 2021-03-03 RX ADMIN — SODIUM CHLORIDE, SODIUM LACTATE, POTASSIUM CHLORIDE, AND CALCIUM CHLORIDE: .6; .31; .03; .02 INJECTION, SOLUTION INTRAVENOUS at 14:50

## 2021-03-03 RX ADMIN — ONDANSETRON 4 MG: 2 INJECTION INTRAMUSCULAR; INTRAVENOUS at 17:33

## 2021-03-03 RX ADMIN — FENTANYL CITRATE 25 MCG: 50 INJECTION, SOLUTION INTRAMUSCULAR; INTRAVENOUS at 11:43

## 2021-03-03 RX ADMIN — CEFAZOLIN SODIUM 2000 MG: 2 SOLUTION INTRAVENOUS at 15:45

## 2021-03-03 RX ADMIN — SODIUM CHLORIDE, SODIUM LACTATE, POTASSIUM CHLORIDE, AND CALCIUM CHLORIDE 125 ML/HR: .6; .31; .03; .02 INJECTION, SOLUTION INTRAVENOUS at 10:13

## 2021-03-03 RX ADMIN — ROCURONIUM BROMIDE 20 MG: 10 SOLUTION INTRAVENOUS at 12:46

## 2021-03-03 RX ADMIN — CEFAZOLIN SODIUM 2000 MG: 2 SOLUTION INTRAVENOUS at 11:48

## 2021-03-03 RX ADMIN — PROPOFOL 100 MG: 10 INJECTION, EMULSION INTRAVENOUS at 11:45

## 2021-03-03 RX ADMIN — LIDOCAINE HYDROCHLORIDE 2 ML: 10 INJECTION, SOLUTION EPIDURAL; INFILTRATION; INTRACAUDAL; PERINEURAL at 10:38

## 2021-03-03 RX ADMIN — LIDOCAINE HYDROCHLORIDE 20 MG: 10 INJECTION, SOLUTION EPIDURAL; INFILTRATION; INTRACAUDAL; PERINEURAL at 11:43

## 2021-03-03 RX ADMIN — ONDANSETRON 4 MG: 2 INJECTION INTRAMUSCULAR; INTRAVENOUS at 12:29

## 2021-03-03 RX ADMIN — PHENYLEPHRINE HYDROCHLORIDE 40 MCG/MIN: 10 INJECTION INTRAVENOUS at 12:15

## 2021-03-03 RX ADMIN — ROCURONIUM BROMIDE 30 MG: 10 SOLUTION INTRAVENOUS at 11:54

## 2021-03-03 RX ADMIN — OXYCODONE HYDROCHLORIDE 5 MG: 5 TABLET ORAL at 21:07

## 2021-03-03 RX ADMIN — Medication 100 MG: at 11:45

## 2021-03-03 RX ADMIN — HYDROMORPHONE HYDROCHLORIDE 0.2 MG: 1 INJECTION, SOLUTION INTRAMUSCULAR; INTRAVENOUS; SUBCUTANEOUS at 14:51

## 2021-03-03 RX ADMIN — FENTANYL CITRATE 25 MCG: 50 INJECTION, SOLUTION INTRAMUSCULAR; INTRAVENOUS at 10:38

## 2021-03-03 RX ADMIN — ROPIVACAINE HYDROCHLORIDE 12.5 ML: 2 INJECTION, SOLUTION EPIDURAL; INFILTRATION at 10:38

## 2021-03-03 RX ADMIN — TRANEXAMIC ACID 1000 MG: 1 INJECTION, SOLUTION INTRAVENOUS at 17:28

## 2021-03-03 RX ADMIN — DEXAMETHASONE SODIUM PHOSPHATE 4 MG: 4 INJECTION, SOLUTION INTRAMUSCULAR; INTRAVENOUS at 12:10

## 2021-03-03 RX ADMIN — HYDROMORPHONE HYDROCHLORIDE 0.4 MG: 1 INJECTION, SOLUTION INTRAMUSCULAR; INTRAVENOUS; SUBCUTANEOUS at 14:26

## 2021-03-03 RX ADMIN — KETAMINE HYDROCHLORIDE 30 MG: 50 INJECTION INTRAMUSCULAR; INTRAVENOUS at 12:31

## 2021-03-03 RX ADMIN — TRANEXAMIC ACID 1000 MG: 1 INJECTION, SOLUTION INTRAVENOUS at 13:28

## 2021-03-03 RX ADMIN — FENTANYL CITRATE 50 MCG: 50 INJECTION, SOLUTION INTRAMUSCULAR; INTRAVENOUS at 11:50

## 2021-03-03 RX ADMIN — ROPIVACAINE HYDROCHLORIDE 12.5 ML: 5 INJECTION, SOLUTION EPIDURAL; INFILTRATION; PERINEURAL at 10:38

## 2021-03-03 RX ADMIN — MIDAZOLAM HYDROCHLORIDE 2 MG: 1 INJECTION, SOLUTION INTRAMUSCULAR; INTRAVENOUS at 10:38

## 2021-03-03 RX ADMIN — SODIUM CHLORIDE, SODIUM LACTATE, POTASSIUM CHLORIDE, AND CALCIUM CHLORIDE 125 ML/HR: .6; .31; .03; .02 INJECTION, SOLUTION INTRAVENOUS at 19:30

## 2021-03-03 RX ADMIN — SODIUM CHLORIDE, SODIUM LACTATE, POTASSIUM CHLORIDE, AND CALCIUM CHLORIDE: .6; .31; .03; .02 INJECTION, SOLUTION INTRAVENOUS at 16:41

## 2021-03-03 RX ADMIN — NEOSTIGMINE METHYLSULFATE 3 MG: 1 INJECTION INTRAVENOUS at 17:37

## 2021-03-03 NOTE — H&P
Patient Name:  Gertrudis Macias  MRN:  [id number]    Debbi Winter is a pleasant 64year old follow-up evaluation of right proximal humerus fracture nonunion  She is now 6 months s/p initial injury  Upon evaluation and review of CT scan, she has a non-union fracture of her right humerus  She is severely limited with range of motion which has affected her activities of daily living  At today's appointment, discussion of different treatment options to address the right proximal humerus fracture  Options include:  Continued conservative management with addition of a bone stimulator secondary to nonunion proximal humerus fracture along with labs or surgical intervention  Surgery options include open reduction internal fixation of nonunion with bone graft augmentation or a reverse shoulder arthroplasty to restore function and allow for decreased pain  Due to the fact that she has an atrophic nonunion I have discussed that the likelihood she would begin to socialize of healing with conservative management is likely low  She would like to proceed with the reverse shoulder replacement; she has had overall good results with reverse shoulder prosthesis in contralateral shoulder  Risks and benefits of surgical intervention were discussed with the patient today including but not limited to infection, dislocation, fracture, continued pain, stiffness, decreased ROM  along with expected recovery time  I did state that she should refrain from smoking to increase her chances of healing properly rickeyetn verbalizes understanding  The patient understood and agreed  Consent form was signed in office  The patient was also sent for preoperative laboratory testing and medical clearance with the primary care physician    Due to the fact that she has an atrophic nonunion I have also added additional labs of CRP and pre-albumin to further investigate a possible underlying etiology of her nonunion in order to correct any underlying physiologic issue that could impede operative healing  Due to her history of anemia we have also ordered an iron panel  Patient understood and had no further questions  Patient was also det up for EMG of Right upper extremity and case was discussed at length with physician performing test      History of the Present Illness   Pratibha Haddad is a 64 y o  female with 6 months s/p right proximal humerus fracture  At her last visit on 01/28/2021, a CT scan was ordered to examine her right shoulder  She notes that her activities of daily living are severely limited due to her limited shoulder range of motion  She notes that she has no pain at rest but will experience pain if she tries to move her right arm  She stated that she takes Tylenol PRN without much pain relief  She stated that she uses a heating pad at home with mild relief  She is right-hand dominant Patient denied any numbness or tingling  Review of Systems     Review of Systems   Constitutional: Negative for chills and fever  HENT: Negative for congestion  Respiratory: Negative for cough, chest tightness and shortness of breath  Cardiovascular: Negative for chest pain and palpitations  Gastrointestinal: Negative for abdominal pain  Endocrine: Negative for cold intolerance and heat intolerance  Musculoskeletal: Negative for arthralgias, back pain and gait problem  Neurological: Negative for syncope  Psychiatric/Behavioral: Negative for confusion  Physical Exam     /81   Pulse 86   Temp 97 6 °F (36 4 °C) (Temporal)   Resp 20   Ht 5' 1" (1 549 m)   Wt 112 kg (247 lb 12 8 oz)   SpO2 98%   BMI 46 82 kg/m²     Right shoulder: Active range of motion to 0 degrees forward flexion, 10 degrees abduction, 50 degrees external rotation, and internal rotation to SI joint  Passive range of motion to 130 FF and 110 ABD    All special testing deferred at this time secondary to fracture  The patient is neurovascularly intact distally in the extremity including axillary nerve distribution  Mild, but palpable contraction of deltoid musculature with attempted resisted shoulder extension    Heart RRR without murmur appreciated upon auscultation    Lungs CTA BL    Data Review     I have personally reviewed pertinent films in PACS, and my interpretation follows      No new imaging     Social History     Tobacco Use    Smoking status: Current Every Day Smoker     Packs/day: 0 50     Types: Cigarettes    Smokeless tobacco: Never Used   Substance Use Topics    Alcohol use: No    Drug use: No           Procedures  None     David Arias PA-C

## 2021-03-03 NOTE — ANESTHESIA PROCEDURE NOTES
Peripheral Block    Patient location during procedure: pre-op  Start time: 3/3/2021 10:36 AM  Reason for block: at surgeon's request and post-op pain management  Staffing  Anesthesiologist: Hallie Bauer MD  Performed: anesthesiologist   Preanesthetic Checklist  Completed: patient identified, site marked, surgical consent, pre-op evaluation, timeout performed, IV checked, risks and benefits discussed and monitors and equipment checked  Peripheral Block  Patient position: sitting  Prep: ChloraPrep  Patient monitoring: cardiac monitor, heart rate, continuous pulse ox and frequent blood pressure checks  Block type: interscalene  Laterality: right  Injection technique: single-shot  Procedures: ultrasound guided, Ultrasound guidance required for the procedure to increase accuracy and safety of medication placement and decrease risk of complications    Ultrasound permanent image savedropivacaine (NAROPIN) 0 2% perineural infiltration, 12 5 mL  ropivacaine (NAROPIN) 0 5 % perineural infiltration, 12 5 mL  midazolam (VERSED) 2 mg/2 mL IV, 2 mg  fentaNYL 50 mcg/mL IV, 25 mcg  lidocaine (PF) (XYLOCAINE-MPF) 1 % infiltration, 2 mL  Needle  Needle type: StimupDanceOn   Needle gauge: 22 G  Needle length: 10 cm  Needle localization: anatomical landmarks and ultrasound guidance  Needle insertion depth: 4 cm  Test dose: negative  Assessment  Injection assessment: incremental injection, local visualized surrounding nerve on ultrasound, negative aspiration for heme and no paresthesia on injection  Heart rate change: no  Slow fractionated injection: yes  Post-procedure:  site cleaned  patient tolerated the procedure well with no immediate complications

## 2021-03-03 NOTE — OP NOTE
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, subsequent encounter [S42 291K]     * Closed fracture of head of right humerus, initial encounter [S42 A]    Procedure(s) (LRB):  ARTHROPLASTY SHOULDER REVERSE (Right)    Specimen(s):  * No specimens in log *    Estimated Blood Loss:   100cc    Drains:  Urethral Catheter Non-latex;Straight-tip 16 Fr  (Active)   Number of days: 0       Anesthesia Type:   General w/ Regional    Operative Indications: 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, subsequent encounter [S42 291K]  Closed fracture of head of right humerus, initial encounter [S42 291A]      Operative Findings:  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    Complications:   None    Procedure and Technique:  The patient was seen in the preoperative holding area the appropriate site of surgery was confirmed and the patient was marked  Upon bringing the patient back to the operating room she was placed supine on the operating room table and general anesthesia was applied  The patient was put in the beach chair position with her torso flexed to 30°  An IV bag was used as a bump just medial to the scapula to ensure proper extension of the right shoulder during the procedure  A bump was placed under the knees told the patient in the appropriate position and allowed the hips and knees to flex    All bony prominences were properly padded  Head was secured with proper positioning of the cervical spine and fixed in place  The right upper extremity was prepped and draped in the usual sterile fashion  Preoperative time-out was performed to confirm the patient, site of surgery, and procedure  A 10 blade was used to make an incision from just proximal to the coracoid process extending distally over the deltopectoral interval   Bovie cautery was used to achieve hemostasis superficially in the incision  Cautery was then used to make full-thickness skin flaps laterally and medially over the deltoid and pectoralis major, respectively  The fat stripe was identified in the interval and Metzenbaum scissors were used to dissect the cephalic vein  The plane was developed and the cephalic vein and deltoid were retracted laterally and the pectoralis major was retracted medially  At this time dissection was taken lateral to the conjoined tendon which was appropriately retracted medially  At this point 1 cm of the proximal pectoralis major tendon was released and the long head of the biceps tendon was dissected proximally into the bicipital groove which allowed further identification of the greater and lesser tuberosities  Fibrous tissue was noted at the surgical neck nonunion site  At this time a half-inch osteotome was used to make an osteotomy of the lesser tuberosity  Five Ethibond x2 were placed into the subscapularis tendon at the edge of the lesser tuberosity as stay sutures  At this time a sagittal saw was used to make an osteotomy of the greater tuberosity  Next a Andrea retractor was used to begin subluxation of the remaining humeral head and Bovie cautery was used to carefully peel capsule directly off of bone to free the humeral head fragment    Upon removing the humeral head the greater tuberosity collapsed into the surgical site and sutures were easily placed into the infraspinatus tendon with 5 Ethibond suture at the edge of the greater tuberosity for control of the tuberosity  At this time exposed the glenoid  Retractors were placed posteriorly and anteriorly  The remaining biceps stump was traced back to the glenoid and used to begin removing the labrum circumferentially  Capsular releases were performed superiorly, anteriorly, and inferiorly to approximately the 7 o'clock position, care was taken to remove soft tissue inferiorly from the glenoid at the inferior aspect  At this time GPS tracker was pinned into the coracoid and well secured  The GPS probe was used to register all anatomic points around the glenoid and coracoid process  Upon proper registration the glenoid was prepared for the base plate  Base plate was inserted at preoperative planned anteversion of 1 degree and inferior tilt of 5°  We used a small base plate with a 10 degree superior augment  Screws were drilled and inserted under GPS navigation  The superior screw was placed 1st followed by the inferior screw and then the anterior-inferior and posterior-inferior screws  Screw caps were put into place and a 36 mm glenosphere was secured over the base plate and screwed into place and secured  At this time attention was turned to humeral preparation    Sequential hand reaming of the humeral canal was performed up to 9 mm for an 8 5 mm fracture stem  The trial stem was inserted with a 0 base plate and poly and noted to be at the proper tension  The trial was stable in full internal rotation, external rotation at the side to 90°, abduction to 100° with internal and external rotation  Appropriate tuberosity tensioning was noted  At this time the trial stem was removed and the humeral shaft was copiously irrigated and then dried  2 5 mm drill hole was drilled approximately 1 cm distal to the surgical neck fracture site and just lateral to the bicipital groove, a 2nd hole was then drilled approximately 1 cm posterior to this hole    Jeffrey Harp suture x2 was placed outside the cortex of the humerus through the intramedullary canal and out the other hole in opposite directions with aid of his few since suture Passer for later tuberosity repair  Cement was inserted into the intramedullary canal and the final stem was inserted lining up the fin of the prosthesis to the bicipital groove which equated to approximately 25° of retroversion  After hardening of the cement another trial was performed with the +0 humeral tray and +0 humeral liner which again noted proper range of motion without impingement and significant stability as well as proper tensioning of the greater and lesser tuberosities around the fracture stem  At this time final +0 humeral tray and +0 humeral liner were inserted and the prosthesis was reduced  At this time fluoroscopic images confirmed proper alignment of prosthesis  Sutures were then placed through the stem of the prosthesis from the greater tuberosity and lesser tuberosity sutures  Surgical site was copiously irrigated  Bone graft from the humeral head was inserted underneath the tuberosities and around the fracture stem  The greater and lesser tuberosities were individually tied to the prosthesis  Greater tuberosity sutures were tied approximately 30° of external rotation and lesser tuberosity sutures were tied in neutral rotation  At this time sutures placed in the humeral shaft were individually placed through the supraspinatus, and the 2nd was placed through the superior aspect of subscapularis  The sutures were then tied  The shoulder was then taken from full internal rotation to 50° of external rotation without any displacement of the tuberosity repair  The tuberosity repair moved as a single unit  Final fluoroscopic images once again confirmed the prosthesis placement and tuberosity repair  At this time approximately 0 5 g of vancomycin powder rain sutured deep into surgical site    The deltopectoral interval was then closed with 0 Vicryl suture  At this time gentle irrigation of the superficial wound was performed and the remaining 0 5 g vancomycin powder was inserted over the closed deltopectoral interval   The rest of skin incision was closed in a layered fashion and a sterile dressing was applied  Patient was placed in a sling with abduction pillow maintaining the arm in internal rotation  The patient tolerated the procedure well  The patient was transferred from the operating room to the PACU in stable condition  No complications were noted       I was present for the entire procedure, A qualified resident physician was not available and A physician assistant was required during the procedure for retraction tissue handling,dissection and suturing    Patient Disposition:  PACU     SIGNATURE: Jonathan Pearson DO  DATE: March 3, 2021  TIME: 6:03 PM

## 2021-03-03 NOTE — ANESTHESIA POSTPROCEDURE EVALUATION
Post-Op Assessment Note    CV Status:  Stable  Pain Score: 0    Pain management: adequate     Mental Status:  Alert and awake   Hydration Status:  Stable   PONV Controlled:  None   Airway Patency:  Patent and adequate      Post Op Vitals Reviewed: Yes      Staff: Anesthesiologist, CRNA         No complications documented      BP   176/81   Temp   99 3   Pulse  102   Resp   18   SpO2   94%

## 2021-03-03 NOTE — ANESTHESIA PREPROCEDURE EVALUATION
Procedure:  ARTHROPLASTY SHOULDER REVERSE (Right Shoulder)    Relevant Problems   No relevant active problems        Physical Exam    Airway    Mallampati score: III  TM Distance: >3 FB  Neck ROM: full     Dental       Cardiovascular  Cardiovascular exam normal    Pulmonary  Pulmonary exam normal     Other Findings     64year old who is here today for a follow-up evaluation of right proximal humerus fracture nonunion  She is now 6 months s/p initial injury  Upon evaluation and review of CT scan, she has a non-union fracture of her right humerus  She is severely limited with range of motion that has affected her activities of daily living  At today's visit, I went over different treatment options to address the right proximal humerus fracture  Options include:  Continued conservative management with addition bone stimulator along with labs or surgical intervention  Surgery options include open reduction internal fixation of nonunion with bone graft  Augmentation or a reverse shoulder arthroplasty to restore function and decrease her pain  Due to the fact that she has an atrophic nonunion I have discussed that the likelihood she would begin to socialize of healing with conservative management is likely low  She would like to proceed with the reverse shoulder replacement, especially since she has had overall good results with reverse shoulder prosthesis in her left upper extremity  and she is happy with the function that it provides  Anesthesia Plan  ASA Score- 2     Anesthesia Type- regional and general with ASA Monitors  Additional Monitors:   Airway Plan: ETT  Plan Factors-Exercise tolerance (METS): >4 METS  Chart reviewed  EKG reviewed  Imaging results reviewed  Existing labs reviewed  Patient summary reviewed  Induction- intravenous  Postoperative Plan-   Planned trial extubation    Informed Consent-   I personally reviewed this patient with the CRNA   Discussed and agreed on the Anesthesia Plan with the CRNA           Medical rashel :      Assessment of Chronic Conditions:   - Diabetes Mellitus: PRE-DIABETES WITH AN A1C OF 6 0         Assessment of Cardiac Risk:  · Denies unstable or severe angina or MI in the last 6 weeks or history of stent placement in the last year   · Denies decompensated heart failure (e g  New onset heart failure, NYHA functional class IV heart failure, or worsening existing heart failure)  · Denies significant arrhythmias such as high grade AV block, symptomatic ventricular arrhythmia, newly recognized ventricular tachycardia, supraventricular tachycardia with resting heart rate >100, or symptomatic bradycardia  · Denies severe heart valve disease including aortic stenosis or symptomatic mitral stenosis     Exercise Capacity:  · Able to walk 4 blocks without symptoms?: Yes  · Able to walk 2 flights without symptoms?: Yes     Prior Anesthesia Reactions: No     Personal history of venous thromboembolic disease? No     History of steroid use for >2 weeks within last year? No     STOP-BANG Sleep Apnea Screening Questionnaire:      Do you SNORE loudly (louder than talking or loud enough to be heard through closed doors)? Yes = 1 point   Do you often feel TIRED, fatigued, or sleepy during daytime? No = 0 point   Has anyone OBSERVED you stop breathing during your sleep? No = 0 point   Do you have or are you being treated for high blood pressure? No = 0 point   BMI more than 35 kg/m2? Yes = 1 point   AGE over 48years old? Yes = 1 point   NECK circumference > 16 inches (40 cm)? Yes = 1 point   Male GENDER?  No = 0 point   TOTAL SCORE 4= INTERMEDIATE risk of ISELA

## 2021-03-04 ENCOUNTER — TELEPHONE (OUTPATIENT)
Dept: OBGYN CLINIC | Facility: HOSPITAL | Age: 61
End: 2021-03-04

## 2021-03-04 VITALS
OXYGEN SATURATION: 82 % | HEART RATE: 97 BPM | BODY MASS INDEX: 46.78 KG/M2 | SYSTOLIC BLOOD PRESSURE: 132 MMHG | WEIGHT: 247.8 LBS | HEIGHT: 61 IN | RESPIRATION RATE: 18 BRPM | DIASTOLIC BLOOD PRESSURE: 79 MMHG | TEMPERATURE: 99.3 F

## 2021-03-04 DIAGNOSIS — Z96.611 STATUS POST REVERSE TOTAL REPLACEMENT OF RIGHT SHOULDER: ICD-10-CM

## 2021-03-04 DIAGNOSIS — Z96.611 STATUS POST REVERSE TOTAL REPLACEMENT OF RIGHT SHOULDER: Primary | ICD-10-CM

## 2021-03-04 LAB
ALBUMIN SERPL BCP-MCNC: 2.3 G/DL (ref 3.5–5)
ALP SERPL-CCNC: 111 U/L (ref 46–116)
ALT SERPL W P-5'-P-CCNC: 20 U/L (ref 12–78)
ANION GAP SERPL CALCULATED.3IONS-SCNC: 10 MMOL/L (ref 4–13)
AST SERPL W P-5'-P-CCNC: 46 U/L (ref 5–45)
BASOPHILS # BLD AUTO: 0.02 THOUSANDS/ΜL (ref 0–0.1)
BASOPHILS NFR BLD AUTO: 0 % (ref 0–1)
BILIRUB SERPL-MCNC: 0.3 MG/DL (ref 0.2–1)
BUN SERPL-MCNC: 12 MG/DL (ref 5–25)
CALCIUM ALBUM COR SERPL-MCNC: 9.7 MG/DL (ref 8.3–10.1)
CALCIUM SERPL-MCNC: 8.3 MG/DL (ref 8.3–10.1)
CHLORIDE SERPL-SCNC: 104 MMOL/L (ref 100–108)
CO2 SERPL-SCNC: 27 MMOL/L (ref 21–32)
CREAT SERPL-MCNC: 0.92 MG/DL (ref 0.6–1.3)
EOSINOPHIL # BLD AUTO: 0.01 THOUSAND/ΜL (ref 0–0.61)
EOSINOPHIL NFR BLD AUTO: 0 % (ref 0–6)
ERYTHROCYTE [DISTWIDTH] IN BLOOD BY AUTOMATED COUNT: 20.7 % (ref 11.6–15.1)
GFR SERPL CREATININE-BSD FRML MDRD: 69 ML/MIN/1.73SQ M
GLUCOSE SERPL-MCNC: 158 MG/DL (ref 65–140)
HCT VFR BLD AUTO: 29.4 % (ref 34.8–46.1)
HGB BLD-MCNC: 7.9 G/DL (ref 11.5–15.4)
IMM GRANULOCYTES # BLD AUTO: 0.09 THOUSAND/UL (ref 0–0.2)
IMM GRANULOCYTES NFR BLD AUTO: 1 % (ref 0–2)
LYMPHOCYTES # BLD AUTO: 1.12 THOUSANDS/ΜL (ref 0.6–4.47)
LYMPHOCYTES NFR BLD AUTO: 8 % (ref 14–44)
MCH RBC QN AUTO: 18.8 PG (ref 26.8–34.3)
MCHC RBC AUTO-ENTMCNC: 26.9 G/DL (ref 31.4–37.4)
MCV RBC AUTO: 70 FL (ref 82–98)
MONOCYTES # BLD AUTO: 0.78 THOUSAND/ΜL (ref 0.17–1.22)
MONOCYTES NFR BLD AUTO: 6 % (ref 4–12)
NEUTROPHILS # BLD AUTO: 11.84 THOUSANDS/ΜL (ref 1.85–7.62)
NEUTS SEG NFR BLD AUTO: 85 % (ref 43–75)
NRBC BLD AUTO-RTO: 0 /100 WBCS
PLATELET # BLD AUTO: 405 THOUSANDS/UL (ref 149–390)
PMV BLD AUTO: 9.8 FL (ref 8.9–12.7)
POTASSIUM SERPL-SCNC: 4 MMOL/L (ref 3.5–5.3)
PROT SERPL-MCNC: 6.4 G/DL (ref 6.4–8.2)
RBC # BLD AUTO: 4.21 MILLION/UL (ref 3.81–5.12)
SODIUM SERPL-SCNC: 141 MMOL/L (ref 136–145)
WBC # BLD AUTO: 13.86 THOUSAND/UL (ref 4.31–10.16)

## 2021-03-04 PROCEDURE — 99024 POSTOP FOLLOW-UP VISIT: CPT | Performed by: ORTHOPAEDIC SURGERY

## 2021-03-04 PROCEDURE — 80053 COMPREHEN METABOLIC PANEL: CPT | Performed by: PHYSICIAN ASSISTANT

## 2021-03-04 PROCEDURE — NC001 PR NO CHARGE: Performed by: PHYSICIAN ASSISTANT

## 2021-03-04 PROCEDURE — 85025 COMPLETE CBC W/AUTO DIFF WBC: CPT | Performed by: PHYSICIAN ASSISTANT

## 2021-03-04 PROCEDURE — 97163 PT EVAL HIGH COMPLEX 45 MIN: CPT

## 2021-03-04 PROCEDURE — 97167 OT EVAL HIGH COMPLEX 60 MIN: CPT

## 2021-03-04 RX ORDER — OXYCODONE HYDROCHLORIDE AND ACETAMINOPHEN 5; 325 MG/1; MG/1
1 TABLET ORAL EVERY 4 HOURS PRN
Qty: 20 TABLET | Refills: 0 | Status: SHIPPED | OUTPATIENT
Start: 2021-03-04

## 2021-03-04 RX ORDER — OXYCODONE HYDROCHLORIDE AND ACETAMINOPHEN 5; 325 MG/1; MG/1
1 TABLET ORAL EVERY 4 HOURS PRN
Qty: 20 TABLET | Refills: 0 | Status: SHIPPED | OUTPATIENT
Start: 2021-03-04 | End: 2021-03-04

## 2021-03-04 RX ADMIN — FERROUS GLUCONATE 324 MG: 324 TABLET ORAL at 09:37

## 2021-03-04 RX ADMIN — OXYCODONE HYDROCHLORIDE 10 MG: 10 TABLET ORAL at 03:14

## 2021-03-04 RX ADMIN — CEFAZOLIN SODIUM 2000 MG: 2 SOLUTION INTRAVENOUS at 03:14

## 2021-03-04 RX ADMIN — HYDROMORPHONE HYDROCHLORIDE 0.2 MG: 1 INJECTION, SOLUTION INTRAMUSCULAR; INTRAVENOUS; SUBCUTANEOUS at 05:48

## 2021-03-04 RX ADMIN — OXYCODONE HYDROCHLORIDE 10 MG: 10 TABLET ORAL at 09:36

## 2021-03-04 NOTE — PLAN OF CARE
Problem: OCCUPATIONAL THERAPY ADULT  Goal: Performs self-care activities at highest level of function for planned discharge setting  See evaluation for individualized goals  Description: Treatment Interventions: ADL retraining, Activityengagement, Energy conservation, Cardiac education, Continued evaluation, Functional transfer training, UE strengthening/ROM, Patient/family training, Equipment evaluation/education, Compensatory technique education          See flowsheet documentation for full assessment, interventions and recommendations  Note: Limitation: Decreased ADL status, Decreased UE ROM, Decreased UE strength, Decreased endurance, Decreased self-care trans, Decreased high-level ADLs, Non-func R UE  Prognosis: Good  Assessment: Pt is a 64 y o  female seen for OT evaluation s/p admit to [facility] on 3/3/2021 w/ Closed fracture of proximal end of right humerus with routine healing  Comorbidities affecting pt's functional performance at time of assessment include:fall and right humeral fx   Orders placed for OT evaluation and treatment and "NWB" immobilizer,  order  Performed at least two patient identifiers during session including name and wristband  Personal factors affecting pt at time of IE include:steps to enter environment, difficulty performing ADLS, difficulty performing IADLS , decreased initiation and engagement , financial barriers, health management  and environment  Prior to admission, pt reports since fx she was requiring more A with  with ADLs, A with IADLs, and (+) driving    Upon evaluation: Pt requires mod A with UB ADLs, max A with LB ADLs, min A with xfers and min A with functional mobility 2* the following deficits impacting occupational performance: weakness, decreased ROM, decreased strength, decreased dynamic sit/ stand balance, decreased activity tolerance, impaired GMC, increased pain, orthopedic restrictions, decreased mobilty and requiring external assistance to complete transitional movements  Pt to benefit from continued skilled OT tx while in the hospital to address deficits as defined above and maximize level of functional independence w ADL's and functional mobility  Occupational Performance areas to address include: grooming, bathing/shower, toilet hygiene, dressing, medication management, socialization, health maintenance, functional mobility, community mobility and household maintenance  From OT standpoint, recommendation at time of d/c would be home OT then out patient OT         OT Discharge Recommendation: Home with skilled therapy(then out pt)  OT - OK to Discharge: Yes(when medically cleared)
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
## 2021-03-04 NOTE — PROGRESS NOTES
Progress Note - Orthopedics   Gardenia Sever 64 y o  female MRN: [id number]  Unit/Bed#: -01      Subjective:    64 y  o female POD#1 s/p right reverse total shoulder arthroplasty  Patient resting comfortably in bed with shoulder immobilizer applied and in no acute distress  Patient reports discomfort in Right upper extremity this morning  She does report she had a good sleep last night despite right upper extremity discomfort  Patient lives with  and 2 daughters and will have enough help at home once discharged  At this time, patient denies new numbness or tingling in right upper extremity, weakness of wrist or elbow, chest pain, shortness of breath, fevers, chills  Labs:  0   Lab Value Date/Time    HCT 37 0 02/15/2021 1316    HCT 38 8 09/12/2018 1150    HGB 9 8 (L) 02/15/2021 1316    HGB 10 5 (L) 09/12/2018 1150    INR 1 01 02/15/2021 1316    WBC 8 98 02/15/2021 1316    WBC 9 09 09/12/2018 1150    ESR 34 (H) 09/12/2018 1133    CRP 25 7 (H) 02/15/2021 1316       Meds:    Current Facility-Administered Medications:     ferrous gluconate (FERGON) tablet 324 mg, 324 mg, Oral, BID AC, Kaila Eugene MD    HYDROmorphone (DILAUDID) injection 0 2 mg, 0 2 mg, Intravenous, Q6H PRN, Leeroy Berumen PA-C, 0 2 mg at 03/04/21 0548    lactated ringers infusion, 125 mL/hr, Intravenous, Continuous, Leeroy Berumen PA-C, Last Rate: 125 mL/hr at 03/03/21 1930, 125 mL/hr at 03/03/21 1930    nicotine (NICODERM CQ) 7 mg/24hr TD 24 hr patch 1 patch, 1 patch, Transdermal, Daily, Leeroy Berumen PA-C    oxyCODONE (ROXICODONE) immediate release tablet 10 mg, 10 mg, Oral, Q4H PRN, Leeroy Berumen PA-C, 10 mg at 03/04/21 0314    oxyCODONE (ROXICODONE) IR tablet 5 mg, 5 mg, Oral, Q4H PRN, Leeroy Berumen PA-C, 5 mg at 03/03/21 0343    Blood Culture:   No results found for: BLOODCX    Wound Culture:   No results found for: WOUNDCULT    Ins and Outs:  I/O last 24 hours:   In: 3700 [I V :3700]  Out: 725 [Urine:625; Blood:100]          Physical:  Vitals:    03/03/21 2325   BP: 136/80   Pulse: 99   Resp: 18   Temp: 98 °F (36 7 °C)   SpO2: 94%     Musculoskeletal:   Right upper extremity   · Mepilex dressing clean, dry and intact without evidence of serosanguineous strike through  · Sensation intact axillary, musculocutaneous, median, ulnar and radial nerve distribution  · Motor intact with resisted finger and wrist extension/flexion, flexion/extension  · Deltoid contraction palpated upon resisted extension of shoulder while still in immobilizer  · 2+ radial pulse, upper extremity warm and well perfused    Assessment:    64 y  o female POD#1 s/p right reverse total shoulder arthroplasty    Plan:  · Nonweightbearing right upper extremity at this time, shoulder abduction pillow sling intact at all times, can remove for gentle elbow range of motion exercises  No overhead shoulder motion  · PT/OT  · Pain control as per primary team  · Dispo:  Patient doing well this morning despite discomfort right upper extremity  Patient will continue to wear shoulder immobilizer except when performing finger wrist and elbow range of motion exercises  No active range of motion of shoulder at this time  Will obtain CBC and CMP this morning  Will have session this morning with physical therapy/occupational therapy for elbow, wrist and finger range of motion  Anticipate discharge home today after blood work obtained, physical therapy/occupational therapy session  She will follow up appointment scheduled with Dr Manuel Walden for 03/17/2021       Tiffany Butts PA-C

## 2021-03-04 NOTE — PLAN OF CARE
Problem: PHYSICAL THERAPY ADULT  Goal: Performs mobility at highest level of function for planned discharge setting  See evaluation for individualized goals  Description: Treatment/Interventions: Functional transfer training, LE strengthening/ROM, Elevations, Therapeutic exercise, Endurance training, Patient/family training, Bed mobility, Gait training, Spoke to nursing, OT          See flowsheet documentation for full assessment, interventions and recommendations  Note: Prognosis: Good  Problem List: Decreased strength, Decreased endurance, Impaired balance, Decreased mobility, Orthopedic restrictions, Pain  Assessment: Pt is 64year old female seen for PT evaluation s/p admit to [facility] on 3/3/2021 with Closed fracture of proximal end of right humerus with routine healing  PT consulted to assess pt's functional mobility and d/c needs  Order placed for PT eval and tx, with NWB on R UE order  Comorbidities affecting pt's physical performance at time of assessment include anemia, and shoulder fracture  PTA, pt was independent with all functional mobility without an AD  Personal factors affecting pt at time of IE include lives in 2 story house, stairs to enter home, inability to navigate community distances, unable to perform dynamic tasks in community, and positive fall history  Please find objective findings from PT assessment regarding body systems outlined above with impairments and limitations including weakness, impaired balance, decreased endurance, gait deviations, pain, decreased activity tolerance, decreased functional mobility tolerance, fall risk, and orthopedic restrictions  The following objective measures performed on IE also reveal limitations: Barthel Index: 50/100 and Modified [place]: 4 (moderate/severe disability)   Pt's clinical presentation is currently unstable/unpredictable seen in pt's presentation of need for ongoing medical management/monitoring and pt requires cues/assist for safety with functional mobility  Pt to benefit from continued PT tx to address deficits as defined above and maximize level of functional independent mobility and consistency  From PT/mobility standpoint, recommendation at time of d/c would be Home with family support pending progress in order to facilitate return to PLOF  Barriers to Discharge: None     PT Discharge Recommendation: Home; return to previous environment with social support     PT - OK to Discharge: Yes(when medically cleared)    See flowsheet documentation for full assessment

## 2021-03-04 NOTE — PLAN OF CARE
Problem: Potential for Falls  Goal: Patient will remain free of falls  Description: INTERVENTIONS:  - Assess patient frequently for physical needs  -  Identify cognitive and physical deficits and behaviors that affect risk of falls    -  [place] fall precautions as indicated by assessment   - Educate patient/family on patient safety including physical limitations  - Instruct patient to call for assistance with activity based on assessment  - Modify environment to reduce risk of injury  - Consider OT/PT consult to assist with strengthening/mobility  Outcome: Progressing     Problem: PAIN - ADULT  Goal: Verbalizes/displays adequate comfort level or baseline comfort level  Description: Interventions:  - Encourage patient to monitor pain and request assistance  - Assess pain using appropriate pain scale  - Administer analgesics based on type and severity of pain and evaluate response  - Implement non-pharmacological measures as appropriate and evaluate response  - Consider cultural and social influences on pain and pain management  - Notify physician/advanced practitioner if interventions unsuccessful or patient reports new pain  Outcome: Progressing     Problem: INFECTION - ADULT  Goal: Absence or prevention of progression during hospitalization  Description: INTERVENTIONS:  - Assess and monitor for signs and symptoms of infection  - Monitor lab/diagnostic results  - Monitor all insertion sites, i e  indwelling lines, tubes, and drains  - Monitor endotracheal if appropriate and nasal secretions for changes in amount and color  - [place] appropriate cooling/warming therapies per order  - Administer medications as ordered  - Instruct and encourage patient and family to use good hand hygiene technique  - Identify and instruct in appropriate isolation precautions for identified infection/condition  Outcome: Progressing  Goal: Absence of fever/infection during neutropenic period  Description: INTERVENTIONS:  - Monitor WBC    Outcome: Progressing     Problem: SAFETY ADULT  Goal: Patient will remain free of falls  Description: INTERVENTIONS:  - Assess patient frequently for physical needs  -  Identify cognitive and physical deficits and behaviors that affect risk of falls    -  [place] fall precautions as indicated by assessment   - Educate patient/family on patient safety including physical limitations  - Instruct patient to call for assistance with activity based on assessment  - Modify environment to reduce risk of injury  - Consider OT/PT consult to assist with strengthening/mobility  Outcome: Progressing  Goal: Maintain or return to baseline ADL function  Description: INTERVENTIONS:  -  Assess patient's ability to carry out ADLs; assess patient's baseline for ADL function and identify physical deficits which impact ability to perform ADLs (bathing, care of mouth/teeth, toileting, grooming, dressing, etc )  - Assess/evaluate cause of self-care deficits   - Assess range of motion  - Assess patient's mobility; develop plan if impaired  - Assess patient's need for assistive devices and provide as appropriate  - Encourage maximum independence but intervene and supervise when necessary  - Involve family in performance of ADLs  - Assess for home care needs following discharge   - Consider OT consult to assist with ADL evaluation and planning for discharge  - Provide patient education as appropriate  Outcome: Progressing  Goal: Maintain or return mobility status to optimal level  Description: INTERVENTIONS:  - Assess patient's baseline mobility status (ambulation, transfers, stairs, etc )    - Identify cognitive and physical deficits and behaviors that affect mobility  - Identify mobility aids required to assist with transfers and/or ambulation (gait belt, sit-to-stand, lift, walker, cane, etc )  - [place] fall precautions as indicated by assessment  - Record patient progress and toleration of activity level on Mobility SBAR; progress patient to next Phase/Stage  - Instruct patient to call for assistance with activity based on assessment  - Consider rehabilitation consult to assist with strengthening/weightbearing, etc   Outcome: Progressing     Problem: DISCHARGE PLANNING  Goal: Discharge to home or other facility with appropriate resources  Description: INTERVENTIONS:  - Identify barriers to discharge w/patient and caregiver  - Arrange for needed discharge resources and transportation as appropriate  - Identify discharge learning needs (meds, wound care, etc )  - Arrange for interpretive services to assist at discharge as needed  - Refer to Case Management Department for coordinating discharge planning if the patient needs post-hospital services based on physician/advanced practitioner order or complex needs related to functional status, cognitive ability, or social support system  Outcome: Progressing     Problem: Knowledge Deficit  Goal: Patient/family/caregiver demonstrates understanding of disease process, treatment plan, medications, and discharge instructions  Description: Complete learning assessment and assess knowledge base  Interventions:  - Provide teaching at level of understanding  - Provide teaching via preferred learning methods  Outcome: Progressing     Problem: Nutrition/Hydration-ADULT  Goal: Nutrient/Hydration intake appropriate for improving, restoring or maintaining nutritional needs  Description: Monitor and assess patient's nutrition/hydration status for malnutrition  Collaborate with interdisciplinary team and initiate plan and interventions as ordered  Monitor patient's weight and dietary intake as ordered or per policy  Utilize nutrition screening tool and intervene as necessary  Determine patient's food preferences and provide high-protein, high-caloric foods as appropriate       INTERVENTIONS:  - Monitor oral intake, urinary output, labs, and treatment plans  - Assess nutrition and hydration status and recommend course of action  - Evaluate amount of meals eaten  - Assist patient with eating if necessary   - Allow adequate time for meals  - Recommend/ encourage appropriate diets, oral nutritional supplements, and vitamin/mineral supplements  - Order, calculate, and assess calorie counts as needed  - Recommend, monitor, and adjust tube feedings and TPN/PPN based on assessed needs  - Assess need for intravenous fluids  - Provide specific nutrition/hydration education as appropriate  - Include patient/family/caregiver in decisions related to nutrition  Outcome: Progressing     Problem: NEUROSENSORY - ADULT  Goal: Achieves stable or improved neurological status  Description: INTERVENTIONS  - Monitor and report changes in neurological status  - Monitor vital signs such as temperature, blood pressure, glucose, and any other labs ordered   - Initiate measures to prevent increased intracranial pressure  - Monitor for seizure activity and implement precautions if appropriate      Outcome: Progressing  Goal: Remains free of injury related to seizures activity  Description: INTERVENTIONS  - Maintain airway, patient safety  and administer oxygen as ordered  - Monitor patient for seizure activity, document and report duration and description of seizure to physician/advanced practitioner  - If seizure occurs,  ensure patient safety during seizure  - Reorient patient post seizure  - Seizure pads on all 4 side rails  - Instruct patient/family to notify RN of any seizure activity including if an aura is experienced  - Instruct patient/family to call for assistance with activity based on nursing assessment  - Administer anti-seizure medications if ordered    Outcome: Progressing  Goal: Achieves maximal functionality and self care  Description: INTERVENTIONS  - Monitor swallowing and airway patency with patient fatigue and changes in neurological status  - Encourage and assist patient to increase activity and self care     - Encourage visually impaired, hearing impaired and aphasic patients to use assistive/communication devices  Outcome: Progressing     Problem: RESPIRATORY - ADULT  Goal: Achieves optimal ventilation and oxygenation  Description: INTERVENTIONS:  - Assess for changes in respiratory status  - Assess for changes in mentation and behavior  - Position to facilitate oxygenation and minimize respiratory effort  - Oxygen administered by appropriate delivery if ordered  - Initiate smoking cessation education as indicated  - Encourage broncho-pulmonary hygiene including cough, deep breathe, Incentive Spirometry  - Assess the need for suctioning and aspirate as needed  - Assess and instruct to report SOB or any respiratory difficulty  - Respiratory Therapy support as indicated  Outcome: Progressing     Problem: GENITOURINARY - ADULT  Goal: Maintains or returns to baseline urinary function  Description: INTERVENTIONS:  - Assess urinary function  - Encourage oral fluids to ensure adequate hydration if ordered  - Administer IV fluids as ordered to ensure adequate hydration  - Administer ordered medications as needed  - Offer frequent toileting  - Follow urinary retention protocol if ordered  Outcome: Progressing  Goal: Absence of urinary retention  Description: INTERVENTIONS:  - Assess patients ability to void and empty bladder  - Monitor I/O  - Bladder scan as needed  - Discuss with physician/AP medications to alleviate retention as needed  - Discuss catheterization for long term situations as appropriate  Outcome: Progressing  Goal: Urinary catheter remains patent  Description: INTERVENTIONS:  - Assess patency of urinary catheter  - If patient has a chronic herzog, consider changing catheter if non-functioning  - Follow guidelines for intermittent irrigation of non-functioning urinary catheter  Outcome: Progressing     Problem: METABOLIC, FLUID AND ELECTROLYTES - ADULT  Goal: Electrolytes maintained within normal limits  Description: INTERVENTIONS:  - Monitor labs and assess patient for signs and symptoms of electrolyte imbalances  - Administer electrolyte replacement as ordered  - Monitor response to electrolyte replacements, including repeat lab results as appropriate  - Instruct patient on fluid and nutrition as appropriate  Outcome: Progressing  Goal: Fluid balance maintained  Description: INTERVENTIONS:  - Monitor labs   - Monitor I/O and WT  - Instruct patient on fluid and nutrition as appropriate  - Assess for signs & symptoms of volume excess or deficit  Outcome: Progressing  Goal: Glucose maintained within target range  Description: INTERVENTIONS:  - Monitor Blood Glucose as ordered  - Assess for signs and symptoms of hyperglycemia and hypoglycemia  - Administer ordered medications to maintain glucose within target range  - Assess nutritional intake and initiate nutrition service referral as needed  Outcome: Progressing     Problem: SKIN/TISSUE INTEGRITY - ADULT  Goal: Skin integrity remains intact  Description: INTERVENTIONS  - Identify patients at risk for skin breakdown  - Assess and monitor skin integrity  - Assess and monitor nutrition and hydration status  - Monitor labs (i e  albumin)  - Assess for incontinence   - Turn and reposition patient  - Assist with mobility/ambulation  - Relieve pressure over bony prominences  - Avoid friction and shearing  - Provide appropriate hygiene as needed including keeping skin clean and dry  - Evaluate need for skin moisturizer/barrier cream  - Collaborate with interdisciplinary team (i e  Nutrition, Rehabilitation, etc )   - Patient/family teaching  Outcome: Progressing  Goal: Incision(s), wounds(s) or drain site(s) healing without S/S of infection  Description: INTERVENTIONS  - Assess and document risk factors for skin impairment   - Assess and document dressing, incision, wound bed, drain sites and surrounding tissue  - Consider nutrition services referral as needed  - Oral mucous membranes remain intact  - Provide patient/ family education  Outcome: Progressing  Goal: Oral mucous membranes remain intact  Description: INTERVENTIONS  - Assess oral mucosa and hygiene practices  - Implement preventative oral hygiene regimen  - Implement oral medicated treatments as ordered  - Initiate Nutrition services referral as needed  Outcome: Progressing     Problem: MUSCULOSKELETAL - ADULT  Goal: Maintain or return mobility to safest level of function  Description: INTERVENTIONS:  - Assess patient's ability to carry out ADLs; assess patient's baseline for ADL function and identify physical deficits which impact ability to perform ADLs (bathing, care of mouth/teeth, toileting, grooming, dressing, etc )  - Assess/evaluate cause of self-care deficits   - Assess range of motion  - Assess patient's mobility  - Assess patient's need for assistive devices and provide as appropriate  - Encourage maximum independence but intervene and supervise when necessary  - Involve family in performance of ADLs  - Assess for home care needs following discharge   - Consider OT consult to assist with ADL evaluation and planning for discharge  - Provide patient education as appropriate  Outcome: Progressing  Goal: Maintain proper alignment of affected body part  Description: INTERVENTIONS:  - Support, maintain and protect limb and body alignment  - Provide patient/ family with appropriate education  Outcome: Progressing

## 2021-03-04 NOTE — TELEPHONE ENCOUNTER
Dr Jimmy Tyson    [phone]    Patient states that she has [facility] snot have : Oxycodone 5 mg and Ferrous gluconate  Please advise

## 2021-03-04 NOTE — TELEPHONE ENCOUNTER
Patient made aware Percocet was sent to pharmacy on file  Please advise on there Ferrous Gluconate RX, is she to take this and if so can this be sent to pharmacy as well  Thanks

## 2021-03-04 NOTE — CASE MANAGEMENT
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  Patient states she is currently unemployed  Pt unable to drive due to her fracture of right humerus  Upon clearance for discharge patient states her  will transport home  Patient denies needs  Treatment team informed  CM reviewed discharge planning process including the following: identifying help at home, patient preference for discharge planning needs, pharmacy preference, and availability of treatment team to discuss questions or concerns patient and/or family may have regarding understanding medications and recognizing signs and symptoms once discharged  CM also encouraged patient to follow up with all recommended appointments after discharge  Patient advised of importance for patient and family to participate in managing patients medical well being

## 2021-03-04 NOTE — CONSULTS
INTERNAL MEDICINE CONSULT NOTE    Patient: Crissy Alvarado,  1960  MRN [id number]  Date of service is 2021    Assessment:    1  Status post surgical repair of right humeral nonunion  fracture  2  Postoperative pain  3  Cervicalgia  4  Obesity  5  Tobacco use  6  At risk for sleep apnea  7  Anemia  8  Prediabetes    Plan:    1  Postoperative management by Orthopedics team   2  Analgesia by Orthopedics team, patient currently on oxycodone and hydromorphone as needed  3  She has neck pain after the procedure, likely positional related, no signs of neck stiffness or collection  Supportive care  4  She would benefit from weight loss program after resolution of acute issues  Given her BMI she would qualify potentially for bariatric surgery  5  Smoking cessation was advised, nicotine patch will be provided while in the hospital  6  Given obesity patient is at risk for sleep apnea, formal evaluation can be done as an outpatient with a sleep study   7  Microcytic hypochromic anemia likely iron deficiency, start ferrous gluconate  8  A1C of 6 0 noted  Lifestyle modification including Weight loss and low carbohydrate diet would be helpful in this patient  Clearance for VTE prophylaxis and PT OT by Orthopedics    Recommendations for Discharge:  · Discharge to be recommended by Orthopedics    Counseling / Coordination of Care Time: 30 minutes  Greater than 50% of total time spent on patient counseling and coordination of care  Collaboration of Care: Were Recommendations Directly Discussed with Primary Treatment Team? - Yes     History of Present Illness:    Crissy Alvarado is a 64 y o  female who is originally admitted to the orthopedic service due to nonunion fracture in R humerus  We are consulted for medical management  This patient has suffered from a previous fracture in the R humerus 6 months ago without proper healing    She now came to the hospital for elective intervention regarding fracture given the fact that at this point this is considered a nonunion fracture  Internal Medicine is being consulted for medical management  I have seen the patient the postoperative  Except for minor pain she is not complaining of anything at this point  She is not on any medicines chronically she denies any healthcare issues  Given her BMI she would qualify for a diagnosis of obesity and she is also at risk for sleep apnea  She admits being a smoker  She also has anemia noted with hemoglobin of 9 8  At this point in time she denies any chest pain shortness of breath nausea vomiting diarrhea constipation  She feels overall tired and wants to sleep  Review of Systems:    Review of Systems   Musculoskeletal:        She does have some postoperative pain in the right shoulder, also complains of minor pain in the occipital area   All other systems reviewed and are negative     more than 10 systems reviewed and negative except for the above    Past Medical and Surgical History:     Past Medical History:   Diagnosis Date    Anemia     Shoulder fracture        Past Surgical History:   Procedure Laterality Date    CHOLECYSTECTOMY      FOREIGN BODY REMOVAL Left     foot    SHOULDER SURGERY Left        Meds/Allergies:    all medications and allergies reviewed    Allergies: No Known Allergies    Social History:     Marital Status: /Civil Union    Substance Use History:   Social History     Substance and Sexual Activity   Alcohol Use No     Social History     Tobacco Use   Smoking Status Current Every Day Smoker    Packs/day: 0 50    Types: Cigarettes   Smokeless Tobacco Never Used     Social History     Substance and Sexual Activity   Drug Use No       Family History:    Family History   Problem Relation Age of Onset    No Known Problems Mother     No Known Problems Father        Physical Exam:     Vitals:   Blood Pressure: 150/86 (03/03/21 1852)  Pulse: 90 (03/03/21 1852)  Temperature: 97 7 °F (36 5 °C) (03/03/21 1852)  Temp Source: Axillary (03/03/21 1852)  Respirations: 20 (03/03/21 1852)  Height: 5' 1" (154 9 cm) (03/03/21 0955)  Weight - Scale: 112 kg (247 lb 12 8 oz) (03/03/21 0955)  SpO2: 95 % (03/03/21 2000)    Physical Exam  Vitals signs and nursing note reviewed  Constitutional:       Appearance: Normal appearance  She is normal weight  Comments: Middle-age female in bed, awake   HENT:      Head: Normocephalic and atraumatic  Right Ear: External ear normal       Left Ear: External ear normal       Nose: Nose normal  No congestion  Mouth/Throat:      Mouth: Mucous membranes are moist       Pharynx: Oropharynx is clear  No oropharyngeal exudate or posterior oropharyngeal erythema  Eyes:      General: No scleral icterus  Right eye: No discharge  Left eye: No discharge  Extraocular Movements: Extraocular movements intact  Conjunctiva/sclera: Conjunctivae normal       Pupils: Pupils are equal, round, and reactive to light  Neck:      Musculoskeletal: Normal range of motion and neck supple  No neck rigidity or muscular tenderness  Vascular: No carotid bruit  Cardiovascular:      Rate and Rhythm: Normal rate and regular rhythm  Pulses: Normal pulses  Heart sounds: Normal heart sounds  No murmur  No friction rub  No gallop  Pulmonary:      Effort: Pulmonary effort is normal  No respiratory distress  Breath sounds: Normal breath sounds  No stridor  No wheezing, rhonchi or rales  Chest:      Chest wall: No tenderness  Abdominal:      General: Abdomen is flat  Bowel sounds are normal  There is no distension  Palpations: Abdomen is soft  There is no mass  Tenderness: There is no abdominal tenderness  There is no guarding or rebound  Musculoskeletal: Normal range of motion  General: No swelling, tenderness, deformity or signs of injury        Comments: Right upper extremity is status post recent procedure, extremities currently in a sling   Lymphadenopathy:      Cervical: No cervical adenopathy  Skin:     General: Skin is warm and dry  Capillary Refill: Capillary refill takes less than 2 seconds  Coloration: Skin is not jaundiced or pale  Findings: No bruising, erythema, lesion or rash  Neurological:      General: No focal deficit present  Mental Status: She is alert and oriented to person, place, and time  Mental status is at baseline  Cranial Nerves: No cranial nerve deficit  Sensory: No sensory deficit  Motor: No weakness  Coordination: Coordination normal       Comments: Bailey HADLEY,     Psychiatric:         Mood and Affect: Mood normal          Behavior: Behavior normal          Thought Content: Thought content normal          Judgment: Judgment normal            Additional Data:     Lab Results: I have personally reviewed pertinent reports  Lab Results   Component Value Date/Time    HGBA1C 6 0 (H) 02/15/2021 01:16 PM               Imaging: I have personally reviewed pertinent reports  XR shoulder 2+ vw right    (Results Pending)           ** Please Note: This note has been constructed using a voice recognition system   **

## 2021-03-04 NOTE — DISCHARGE SUMMARY
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, subsequent encounter [S42 291K]  Closed fracture of head of right humerus, initial encounter Cindia Brunner    Date of admission: 3/3/2021    Date of discharge: 03/04/21    Procedure: Right Reverse Total Shoulder Arthroplasty      HPI: 64 y o  female with a history of Right proximal humerus fracture who has been seen by Dr Jessica Perdue in clinic  She is 6 months s/p injury causing proximal humerus fracture and deemed nonunion  Patient failed previous non operative therapies and was scheduled for Right reverse total shoulder arthroplasty  Prior to surgery the risks and benefits of surgery were explained and informed consent was obtained while in office    Hospital course: Pt was taken to the OR on 03/03/2021  Surgery went without complications and pt was discharged to the PACU in a stable condition and was transferred to the floor  On POD#1, patient pain was controlled and participated with PT/OT  On discharge date pt was cleared by PT and the medicine team and determined to be safe for discharge  Daily discussion was had with the patient, nursing staff, orthopaedic team, and family members if present  All questions were answered to the patients satisifaction  0   Lab Value Date/Time    HGB 7 9 (L) 03/04/2021 1139    HGB 9 8 (L) 02/15/2021 1316    HGB 10 5 (L) 09/12/2018 1150       Greater than 2 gram decrease in Hb qualifies for diagnosis of acute blood loss anemia  Vital signs remained stable and pt was resuscitated with IVF as needed       Discharge Instructions:   · Nonweightbearing Right upper extremity with shoulder immobilizer intact  Can remove sling for ROM exercises of elbow, wrist and fingers and for hygenic purposes  No active shoulder motion at this time  · Keep dressings clean and dry at all times   · Complete DVT prophylaxis as prescribed   · Physical therapy after first post-op visit  Remove sling for elbow, wrist and finger ROM exercises  · Follow-up in office 03/17/2021 with Dr Hill Fox     Discharge Medications: For the complete list of discharge medications, please refer to the patient's medication reconciliation

## 2021-03-04 NOTE — UTILIZATION REVIEW
Initial Clinical Review    Elective 92973 surgical procedure  Age/Sex: 64 y o  female  Surgery Date: 3/3/2021  Procedure: ARTHROPLASTY SHOULDER REVERSE (Right)  Anesthesia: General w/ Regional  Operative Findings: 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     POD#1 Progress Note:  Shoulder immobilizer in place  Discomfort right upper extremity  Noneightbearing RUE  PT/OT  No overhead activity  3/3 IM consult:  Neck pain after surgery likely positional related  Microcytic hypochromic anemia likely iron deficiency, start ferrous gluconate  Admission Orders: Date/Time/Statement:   Admission Orders (From admission, onward)     Ordered        03/03/21 1734  INPATIENT ADMISSION  Once                   Orders Placed This Encounter   Procedures    INPATIENT ADMISSION     Standing Status:   Standing     Number of Occurrences:   1     Order Specific Question:   Level of Care     Answer:   Med Surg [16]     Order Specific Question:   Estimated length of stay     Answer:   More than 2 Midnights     Order Specific Question:   Certification     Answer:   I certify that inpatient services are medically necessary for this patient for a duration of greater than two midnights  See H&P and MD Progress Notes for additional information about the patient's course of treatment       Vital Signs: /79   Pulse 97   Temp 99 3 °F (37 4 °C)   Resp 18   Ht 5' 1" (1 549 m)   Wt 112 kg (247 lb 12 8 oz)   SpO2 (!) 82%   BMI 46 82 kg/m²   Diet: Regular  Mobility: Up and OOB, PT/OT  DVT Prophylaxis: SCDS,   Medications/Pain Control: OXycodone x 3, dilaudid iv x 1  Scheduled Medications:  ferrous gluconate, 324 mg, Oral, BID AC  nicotine, 1 patch, Transdermal, Daily      Continuous IV Infusions:     PRN Meds:  HYDROmorphone, 0 2 mg, Intravenous, Q6H PRN x1  oxyCODONE, 10 mg, Oral, Q4H PRN x2  oxyCODONE, 5 mg, Oral, Q4H PRN x1        Network Utilization Review Department  ATTENTION: Please call with any questions or concerns to [phone] and carefully listen to the prompts so that you are directed to the right person  All voicemails are confidential   Yumiko Jesus all requests for admission clinical reviews, approved or denied determinations and any other requests to dedicated fax number below belonging to the campus where the patient is receiving treatment   List of dedicated fax numbers for the Facilities:  [address] DENIALS (Administrative/Medical Necessity) [phone]   [address] (Maternity/NICU/Pediatrics) [phone]   [address] [place] [address] [address] [facility] [address] 520 [facility] [address] [address] [phone]     /Time  Temp  Pulse  Resp  BP  MAP (mmHg)  SpO2  O2 Flow Rate (L/min)  O2 Device  Patient Position - Orthostatic VS   03/04/21 07:42:44  99 3 °F (37 4 °C)  97  18  132/79  97  82 %Abnormal   --  --  --   03/03/21 23:25:46  98 °F (36 7 °C)  99  18  136/80  99  94 %  --  Nasal cannula  Lying   03/03/21 2000  --  --  --  --  --  95 %  2 L/min  Nasal cannula  --   03/03/21 1852  97 7 °F (36 5 °C)  90  20  150/86  107  93 %  --  Nasal cannula  Lying   03/03/21 1830  98 8 °F (37 1 °C)  91  20  166/75  --  93 %  4 L/min  Nasal cannula  --   03/03/21 1823  --  --  --  --  --  93 %  4 L/min  Nasal cannula  --   03/03/21 1815  --  101  20  169/76  --  95 %  6 L/min  Nasal cannula  --   03/03/21 1800  99 3 °F (37 4 °C)  102  16  176/81Abnormal   --  94 %  6 L/min Simple mask  --   03/03/21 0955  97 6 °F (36 4 °C)  86  20  147/81  --  98 %  --  None (Room air)       Comprehensive metabolic panel  Order: [id number]  Status:  Final result   Visible to patient:  No (inaccessible in [facility]) Next appt:  03/17/2021 at 01:00 PM [place] [facility] [place]   Ref Range & Units 3/4/21 1139 2/15/21 1316 9/12/18 1133   Sodium 136 - 145 mmol/L 141  143  138    Potassium 3 5 - 5 3 mmol/L 4 0  4 3  4 6    Chloride 100 - 108 mmol/L 104  105  106    CO2 21 - 32 mmol/L 27  27  28    ANION GAP 4 - 13 mmol/L 10  11  4    BUN 5 - 25 mg/dL 12  13  10    Creatinine 0 60 - 1 30 mg/dL 0 92  0 81 CM  0 77 CM    Comment: Standardized to IDMS reference method   Glucose 65 - 140 mg/dL 158High       Comment: If the patient is fasting, the ADA then defines impaired fasting glucose as > 100 mg/dL and diabetes as > or equal to 123 mg/dL  Specimen collection should occur prior to Sulfasalazine administration due to the potential for falsely depressed results  Specimen collection should occur prior to Sulfapyridine administration due to the potential for falsely elevated results  Calcium 8 3 - 10 1 mg/dL 8 3  9 2  9 2    Corrected Calcium 8 3 - 10 1 mg/dL 9 7  10 0     AST 5 - 45 U/L 46High   12 CM  12 CM    Comment: Specimen collection should occur prior to Sulfasalazine administration due to the potential for falsely depressed results  ALT 12 - 78 U/L 20  16 CM  16 CM    Comment: Specimen collection should occur prior to Sulfasalazine administration due to the potential for falsely depressed results      Alkaline Phosphatase 46 - 116 U/L 111  139High   147High     Total Protein 6 4 - 8 2 g/dL 6 4  7 7             CBC and differential  Order: [id number]  Status:  Final result   Visible to patient:  No (inaccessible in [facility]) Next appt:  03/17/2021 at 01:00 PM in Orthopedic Surgery [facility] DO)   Ref Range & Units 3/4/21 1139   WBC 4 31 - 10 16 Thousand/uL 13  86High     RBC 3 81 - 5 12 Million/uL 4 21    Hemoglobin 11 5 - 15 4 g/dL 7 9Low     Hematocrit 34 8 - 46 1 % 29 4Low     MCV 82 - 98 fL 70Low     MCH 26 8 - 34 3 pg 18 8Low     MCHC 31 4 - 37 4 g/dL 26 9Low     RDW 11 6 - 15 1 % 20 7High     MPV 8 9 - 12 7 fL 9 8    Platelets 945 - 706 Thousands/uL 405High     nRBC /100 WBCs 0    Neutrophils Relative 43 - 75 % 85High     Immat GRANS % 0 - 2 % 1    Lymphocytes Relative 14 - 44 % 8Low     Monocytes Relative 4 - 12 % 6    Eosinophils Relative 0 - 6 % 0    Basophils Relative 0 - 1 % 0    Neutrophils Absolute 1 85 - 7 62 Thousands/µL 11  84High     Immature Grans Absolute 0 00 - 0 20 Thousand/uL 0 09

## 2021-03-04 NOTE — PLAN OF CARE
Problem: Potential for Falls  Goal: Patient will remain free of falls  Description: INTERVENTIONS:  - Assess patient frequently for physical needs  -  Identify cognitive and physical deficits and behaviors that affect risk of falls    -  [place] fall precautions as indicated by assessment   - Educate patient/family on patient safety including physical limitations  - Instruct patient to call for assistance with activity based on assessment  - Modify environment to reduce risk of injury  - Consider OT/PT consult to assist with strengthening/mobility  3/4/2021 1403 by Shabana Jacinto RN  Outcome: Adequate for Discharge  3/4/2021 1358 by Shabana Jacinto RN  Outcome: Progressing     Problem: PAIN - ADULT  Goal: Verbalizes/displays adequate comfort level or baseline comfort level  Description: Interventions:  - Encourage patient to monitor pain and request assistance  - Assess pain using appropriate pain scale  - Administer analgesics based on type and severity of pain and evaluate response  - Implement non-pharmacological measures as appropriate and evaluate response  - Consider cultural and social influences on pain and pain management  - Notify physician/advanced practitioner if interventions unsuccessful or patient reports new pain  3/4/2021 1403 by Shabana Jacinto RN  Outcome: Adequate for Discharge  3/4/2021 1358 by Shabana Jacinto RN  Outcome: Progressing     Problem: INFECTION - ADULT  Goal: Absence or prevention of progression during hospitalization  Description: INTERVENTIONS:  - Assess and monitor for signs and symptoms of infection  - Monitor lab/diagnostic results  - Monitor all insertion sites, i e  indwelling lines, tubes, and drains  - Monitor endotracheal if appropriate and nasal secretions for changes in amount and color  - [place] appropriate cooling/warming therapies per order  - Administer medications as ordered  - Instruct and encourage patient and family to use good hand hygiene technique  - Identify and instruct in appropriate isolation precautions for identified infection/condition  3/4/2021 1403 by Gennaro Bravo RN  Outcome: Adequate for Discharge  3/4/2021 1358 by Gennaro Bravo RN  Outcome: Progressing  Goal: Absence of fever/infection during neutropenic period  Description: INTERVENTIONS:  - Monitor WBC    3/4/2021 1403 by Gennaro Bravo RN  Outcome: Adequate for Discharge  3/4/2021 1358 by Gennaro Bravo RN  Outcome: Progressing     Problem: SAFETY ADULT  Goal: Patient will remain free of falls  Description: INTERVENTIONS:  - Assess patient frequently for physical needs  -  Identify cognitive and physical deficits and behaviors that affect risk of falls    -  [place] fall precautions as indicated by assessment   - Educate patient/family on patient safety including physical limitations  - Instruct patient to call for assistance with activity based on assessment  - Modify environment to reduce risk of injury  - Consider OT/PT consult to assist with strengthening/mobility  3/4/2021 1403 by Gennaro Bravo RN  Outcome: Adequate for Discharge  3/4/2021 1358 by Gennaro Bravo RN  Outcome: Progressing  Goal: Maintain or return to baseline ADL function  Description: INTERVENTIONS:  -  Assess patient's ability to carry out ADLs; assess patient's baseline for ADL function and identify physical deficits which impact ability to perform ADLs (bathing, care of mouth/teeth, toileting, grooming, dressing, etc )  - Assess/evaluate cause of self-care deficits   - Assess range of motion  - Assess patient's mobility; develop plan if impaired  - Assess patient's need for assistive devices and provide as appropriate  - Encourage maximum independence but intervene and supervise when necessary  - Involve family in performance of ADLs  - Assess for home care needs following discharge   - Consider OT consult to assist with ADL evaluation and planning for discharge  - Provide patient education as appropriate  3/4/2021 1403 by Brandon Blanco RN  Outcome: Adequate for Discharge  3/4/2021 1358 by Brandon Blanco RN  Outcome: Progressing  Goal: Maintain or return mobility status to optimal level  Description: INTERVENTIONS:  - Assess patient's baseline mobility status (ambulation, transfers, stairs, etc )    - Identify cognitive and physical deficits and behaviors that affect mobility  - Identify mobility aids required to assist with transfers and/or ambulation (gait belt, sit-to-stand, lift, walker, cane, etc )  - [place] fall precautions as indicated by assessment  - Record patient progress and toleration of activity level on Mobility SBAR; progress patient to next Phase/Stage  - Instruct patient to call for assistance with activity based on assessment  - Consider rehabilitation consult to assist with strengthening/weightbearing, etc   3/4/2021 1403 by Brandon Blanco RN  Outcome: Adequate for Discharge  3/4/2021 1358 by Brandon Blanco RN  Outcome: Progressing     Problem: DISCHARGE PLANNING  Goal: Discharge to home or other facility with appropriate resources  Description: INTERVENTIONS:  - Identify barriers to discharge w/patient and caregiver  - Arrange for needed discharge resources and transportation as appropriate  - Identify discharge learning needs (meds, wound care, etc )  - Arrange for interpretive services to assist at discharge as needed  - Refer to Case Management Department for coordinating discharge planning if the patient needs post-hospital services based on physician/advanced practitioner order or complex needs related to functional status, cognitive ability, or social support system  3/4/2021 1403 by Brandon Blanco RN  Outcome: Adequate for Discharge  3/4/2021 1358 by Brandon Blanco RN  Outcome: Progressing     Problem: Knowledge Deficit  Goal: Patient/family/caregiver demonstrates understanding of disease process, treatment plan, medications, and discharge instructions  Description: Complete learning assessment and assess knowledge base  Interventions:  - Provide teaching at level of understanding  - Provide teaching via preferred learning methods  3/4/2021 1403 by Helen Jean-Baptiste RN  Outcome: Adequate for Discharge  3/4/2021 1358 by Helen Jean-Baptiste RN  Outcome: Progressing     Problem: Nutrition/Hydration-ADULT  Goal: Nutrient/Hydration intake appropriate for improving, restoring or maintaining nutritional needs  Description: Monitor and assess patient's nutrition/hydration status for malnutrition  Collaborate with interdisciplinary team and initiate plan and interventions as ordered  Monitor patient's weight and dietary intake as ordered or per policy  Utilize nutrition screening tool and intervene as necessary  Determine patient's food preferences and provide high-protein, high-caloric foods as appropriate       INTERVENTIONS:  - Monitor oral intake, urinary output, labs, and treatment plans  - Assess nutrition and hydration status and recommend course of action  - Evaluate amount of meals eaten  - Assist patient with eating if necessary   - Allow adequate time for meals  - Recommend/ encourage appropriate diets, oral nutritional supplements, and vitamin/mineral supplements  - Order, calculate, and assess calorie counts as needed  - Recommend, monitor, and adjust tube feedings and TPN/PPN based on assessed needs  - Assess need for intravenous fluids  - Provide specific nutrition/hydration education as appropriate  - Include patient/family/caregiver in decisions related to nutrition  3/4/2021 1403 by Helen Jean-Baptiste RN  Outcome: Adequate for Discharge  3/4/2021 1358 by Helen Jean-Baptiste RN  Outcome: Progressing     Problem: NEUROSENSORY - ADULT  Goal: Achieves stable or improved neurological status  Description: INTERVENTIONS  - Monitor and report changes in neurological status  - Monitor vital signs such as temperature, blood pressure, glucose, and any other labs ordered   - Initiate measures to prevent increased intracranial pressure  - Monitor for seizure activity and implement precautions if appropriate      3/4/2021 1403 by Dinora Beck RN  Outcome: Adequate for Discharge  3/4/2021 1358 by Dinora Beck RN  Outcome: Progressing  Goal: Remains free of injury related to seizures activity  Description: INTERVENTIONS  - Maintain airway, patient safety  and administer oxygen as ordered  - Monitor patient for seizure activity, document and report duration and description of seizure to physician/advanced practitioner  - If seizure occurs,  ensure patient safety during seizure  - Reorient patient post seizure  - Seizure pads on all 4 side rails  - Instruct patient/family to notify RN of any seizure activity including if an aura is experienced  - Instruct patient/family to call for assistance with activity based on nursing assessment  - Administer anti-seizure medications if ordered    3/4/2021 1403 by Dinora Beck RN  Outcome: Adequate for Discharge  3/4/2021 1358 by Dinora Beck RN  Outcome: Progressing  Goal: Achieves maximal functionality and self care  Description: INTERVENTIONS  - Monitor swallowing and airway patency with patient fatigue and changes in neurological status  - Encourage and assist patient to increase activity and self care     - Encourage visually impaired, hearing impaired and aphasic patients to use assistive/communication devices  3/4/2021 1403 by Dinora Beck RN  Outcome: Adequate for Discharge  3/4/2021 1358 by Dinora Beck RN  Outcome: Progressing     Problem: RESPIRATORY - ADULT  Goal: Achieves optimal ventilation and oxygenation  Description: INTERVENTIONS:  - Assess for changes in respiratory status  - Assess for changes in mentation and behavior  - Position to facilitate oxygenation and minimize respiratory effort  - Oxygen administered by appropriate delivery if ordered  - Initiate smoking cessation education as indicated  - Encourage broncho-pulmonary hygiene including cough, deep breathe, Incentive Spirometry  - Assess the need for suctioning and aspirate as needed  - Assess and instruct to report SOB or any respiratory difficulty  - Respiratory Therapy support as indicated  3/4/2021 1403 by Becca Mortensen RN  Outcome: Adequate for Discharge  3/4/2021 1358 by Becca Mortensen RN  Outcome: Progressing     Problem: GENITOURINARY - ADULT  Goal: Maintains or returns to baseline urinary function  Description: INTERVENTIONS:  - Assess urinary function  - Encourage oral fluids to ensure adequate hydration if ordered  - Administer IV fluids as ordered to ensure adequate hydration  - Administer ordered medications as needed  - Offer frequent toileting  - Follow urinary retention protocol if ordered  3/4/2021 1403 by Becca Mortensen RN  Outcome: Adequate for Discharge  3/4/2021 1358 by Becca Mortensen RN  Outcome: Progressing  Goal: Absence of urinary retention  Description: INTERVENTIONS:  - Assess patients ability to void and empty bladder  - Monitor I/O  - Bladder scan as needed  - Discuss with physician/AP medications to alleviate retention as needed  - Discuss catheterization for long term situations as appropriate  3/4/2021 1403 by Becca Mortensen RN  Outcome: Adequate for Discharge  3/4/2021 1358 by Becca Mortensen RN  Outcome: Progressing  Goal: Urinary catheter remains patent  Description: INTERVENTIONS:  - Assess patency of urinary catheter  - If patient has a chronic herzog, consider changing catheter if non-functioning  - Follow guidelines for intermittent irrigation of non-functioning urinary catheter  3/4/2021 1403 by Becca Mortensen RN  Outcome: Adequate for Discharge  3/4/2021 1358 by Becca Mortensen RN  Outcome: Progressing     Problem: METABOLIC, FLUID AND ELECTROLYTES - ADULT  Goal: Electrolytes maintained within normal limits  Description: INTERVENTIONS:  - Monitor labs and assess patient for signs and symptoms of electrolyte imbalances  - Administer electrolyte replacement as ordered  - Monitor response to electrolyte replacements, including repeat lab results as appropriate  - Instruct patient on fluid and nutrition as appropriate  3/4/2021 1403 by Julienne Rodriguez RN  Outcome: Adequate for Discharge  3/4/2021 1358 by Julienne Rodriguez RN  Outcome: Progressing  Goal: Fluid balance maintained  Description: INTERVENTIONS:  - Monitor labs   - Monitor I/O and WT  - Instruct patient on fluid and nutrition as appropriate  - Assess for signs & symptoms of volume excess or deficit  3/4/2021 1403 by Julienne Rodriguez RN  Outcome: Adequate for Discharge  3/4/2021 1358 by Julienne Rodriguez RN  Outcome: Progressing  Goal: Glucose maintained within target range  Description: INTERVENTIONS:  - Monitor Blood Glucose as ordered  - Assess for signs and symptoms of hyperglycemia and hypoglycemia  - Administer ordered medications to maintain glucose within target range  - Assess nutritional intake and initiate nutrition service referral as needed  3/4/2021 1403 by Julienne Rodriguez RN  Outcome: Adequate for Discharge  3/4/2021 1358 by Julienne Rodriguez RN  Outcome: Progressing     Problem: SKIN/TISSUE INTEGRITY - ADULT  Goal: Skin integrity remains intact  Description: INTERVENTIONS  - Identify patients at risk for skin breakdown  - Assess and monitor skin integrity  - Assess and monitor nutrition and hydration status  - Monitor labs (i e  albumin)  - Assess for incontinence   - Turn and reposition patient  - Assist with mobility/ambulation  - Relieve pressure over bony prominences  - Avoid friction and shearing  - Provide appropriate hygiene as needed including keeping skin clean and dry  - Evaluate need for skin moisturizer/barrier cream  - Collaborate with interdisciplinary team (i e  Nutrition, Rehabilitation, etc )   - Patient/family teaching  3/4/2021 1403 by Julienne Rodriguez RN  Outcome: Adequate for Discharge  3/4/2021 1358 by Julienne Rodriguez RN  Outcome: Progressing  Goal: Incision(s), wounds(s) or drain site(s) healing without S/S of infection  Description: INTERVENTIONS  - Assess and document risk factors for skin impairment   - Assess and document dressing, incision, wound bed, drain sites and surrounding tissue  - Consider nutrition services referral as needed  - Oral mucous membranes remain intact  - Provide patient/ family education  3/4/2021 1403 by Julienne Rodriguez RN  Outcome: Adequate for Discharge  3/4/2021 1358 by Julienne Rodriguez RN  Outcome: Progressing  Goal: Oral mucous membranes remain intact  Description: INTERVENTIONS  - Assess oral mucosa and hygiene practices  - Implement preventative oral hygiene regimen  - Implement oral medicated treatments as ordered  - Initiate Nutrition services referral as needed  3/4/2021 1403 by Julienne Rodriguez RN  Outcome: Adequate for Discharge  3/4/2021 1358 by Julienne Rodriguez RN  Outcome: Progressing     Problem: MUSCULOSKELETAL - ADULT  Goal: Maintain or return mobility to safest level of function  Description: INTERVENTIONS:  - Assess patient's ability to carry out ADLs; assess patient's baseline for ADL function and identify physical deficits which impact ability to perform ADLs (bathing, care of mouth/teeth, toileting, grooming, dressing, etc )  - Assess/evaluate cause of self-care deficits   - Assess range of motion  - Assess patient's mobility  - Assess patient's need for assistive devices and provide as appropriate  - Encourage maximum independence but intervene and supervise when necessary  - Involve family in performance of ADLs  - Assess for home care needs following discharge   - Consider OT consult to assist with ADL evaluation and planning for discharge  - Provide patient education as appropriate  3/4/2021 1403 by Julienne Rodriguez RN  Outcome: Adequate for Discharge  3/4/2021 1358 by Julienne Rodriguez RN  Outcome: Progressing  Goal: Maintain proper alignment of affected body part  Description: INTERVENTIONS:  - Support, maintain and protect limb and body alignment  - Provide patient/ family with appropriate education  3/4/2021 1403 by Asher Temple RN  Outcome: Adequate for Discharge  3/4/2021 1358 by Asher Temple, RN  Outcome: Progressing

## 2021-03-04 NOTE — DISCHARGE INSTRUCTIONS
Discharge Instructions - Orthopedics  Meagan Mage 64 y o  female MRN: [id number]  Unit/Bed#: -01    Weight Bearing Status:                                           Nonweightbearing Right upper extremity with shoulder immobilizer intact  Can remove sling for ROM exercises of elbow, wrist and fingers and for hygenic purposes  No active shoulder motion at this time  DVT prophylaxis  None     Pain:  Continue analgesics as directed    Dressing Instructions:   Please keep clean, dry and intact until follow up     Appt Instructions: Follow up in office with Dr El Tafoya 03/17/2021     Contact the office sooner if you experience any increased numbness/tingling in the extremities  Oxycodone/Acetaminophen (By mouth)   Acetaminophen (z-ekvs-w-MIN-oh-fen), Oxycodone Hydrochloride (lt-c-FUL-done tobias-droe-KLOR-nettie)  Treats moderate to moderately severe pain  This medicine is a narcotic pain reliever  Brand Name(s): Endocet, Nalocet, Percocet, Primlev, Prolate   There may be other brand names for this medicine  When This Medicine Should Not Be Used: This medicine is not right for everyone  Do not use it if you had an allergic reaction to acetaminophen or oxycodone, or if you have serious lung or breathing problems (including asthma, respiratory depression), or stomach or bowel blockage (including paralytic ileus)  How to Use This Medicine:   Capsule, Liquid, Tablet, Long Acting Tablet  · Your doctor will tell you how much medicine to use  Do not use more than directed  · An overdose can be dangerous  Follow directions carefully so you do not get too much medicine at one time  · Oral liquid: Measure the oral liquid medicine with a marked measuring spoon, oral syringe, or medicine cup  · Swallow the extended-release tablet whole  Do not crush, break, or chew it  Do not lick or wet the tablet before placing it in your mouth  Do not give this medicine through a feeding tube    · This medicine should come with a Medication Guide  Ask your pharmacist for a copy if you do not have one  · Missed dose: If you miss a dose of this medicine, skip the missed dose and go back to your regular dosing schedule  Do not double doses  · Store the medicine in a closed container at room temperature, away from heat, moisture, and direct light  Drop off any unused narcotic medicine at a drug take-back location right away  If you do not have a drug take-back location near you, flush any unused narcotic medicine down the toilet  Check your local drug store and clinics for take-back locations  You can also check the [employer] web site for locations  Here is the link to the FDA safe disposal of medicines website: www fda gov/drugs/resourcesforyou/consumers/buyingusingmedicinesafely/ensuringsafeuseofmedicine/safedisposalofmedicines/qpk628020 htm  Drugs and Foods to Avoid:   Ask your doctor or pharmacist before using any other medicine, including over-the-counter medicines, vitamins, and herbal products  · Do not use Xartemis XR if you are using or have used an MAO inhibitor in the past 14 days  · Some medicines can affect how this medicine works  Tell your doctor if you are using any of the following:   ? Carbamazepine, erythromycin, ketoconazole, lamotrigine, mirtazapine, naltrexone, phenytoin, probenecid, propranolol, rifampin, ritonavir, tramadol, trazodone, or zidovudine  ? Birth control pills  ? Diuretic (water pill)  ? Medicine to treat depression  ? Phenothiazine medicine  ? Triptan medicine to treat migraine headaches  · Do not drink alcohol while you are using this medicine  Acetaminophen can damage your liver, and alcohol can increase this risk  Do not take acetaminophen without asking your doctor if you have 3 or more drinks of alcohol every day  · Tell your doctor if you use anything else that makes you sleepy  Some examples are allergy medicine, narcotic pain medicine, and alcohol   Tell your doctor if you are using buprenorphine, butorphanol, nalbuphine, pentazocine, a benzodiazepine, or a muscle relaxer (including cyclobenzaprine, metaxalone)  · Do not drink alcohol while you are using this medicine  Acetaminophen can damage your liver, and your risk is higher if you also drink alcohol  Warnings While Using This Medicine:   · Tell your doctor if you are pregnant or breastfeeding, or if you have kidney disease, liver disease, heart disease, low blood pressure, breathing problems or lung disease (including COPD), thyroid problems, Eder disease, pancreas or gallbladder problems, prostate problems, trouble urinating, or a stomach problems, or a history of head injury or brain damage, seizures, or alcohol or drug abuse  Tell your doctor if you are allergic to codeine  · This medicine may cause the following problems:  ? High risk of overdose, which can lead to death  ? Respiratory depression (serious breathing problem that can be life-threatening)  ? Sleep-related breathing problems (including sleep apnea, sleep-related hypoxemia)  ? Liver problems  ? Serious skin reactions  ? Serotonin syndrome (when used with certain medicines)  · This medicine may make you dizzy or drowsy  Do not drive or do anything that could be dangerous until you know how this medicine affects you  Sit or lie down if you feel dizzy  Stand up carefully  · This medicine contains acetaminophen  Read the labels of all other medicines you are using to see if they also contain acetaminophen, or ask your doctor or pharmacist  Summer Tran not use more than 4 grams (4,000 milligrams) total of acetaminophen in one day  · This medicine can be habit-forming  Do not use more than your prescribed dose  Call your doctor if you think your medicine is not working  · Do not stop using this medicine suddenly  Your doctor will need to slowly decrease your dose before you stop it completely  · This medicine could cause infertility   Talk with your doctor before using this medicine if you plan to have children  · This medicine may cause constipation, especially with long-term use  Ask your doctor if you should use a laxative to prevent and treat constipation  · Keep all medicine out of the reach of children  Never share your medicine with anyone  Possible Side Effects While Using This Medicine:   Call your doctor right away if you notice any of these side effects:  · Allergic reaction: Itching or hives, swelling in your face or hands, swelling or tingling in your mouth or throat, chest tightness, trouble breathing  · Anxiety, restlessness, fast heartbeat, fever, muscle spasms, twitching, diarrhea, seeing or hearing things that are not there  · Blistering, peeling, red skin rash  · Blue lips, fingernails, or skin  · Dark urine or pale stools, loss of appetite, stomach pain, yellow skin or eyes  · Extreme weakness, shallow breathing, uneven heartbeat, seizures, sweating, or cold or clammy skin  · Severe confusion, lightheadedness, dizziness, or fainting  · Severe constipation, nausea, or vomiting  · Trouble breathing or slow breathing  If you notice these less serious side effects, talk with your doctor:   · Headache  · Mild constipation, nausea, or vomiting  · Mild sleepiness or drowsiness  If you notice other side effects that you think are caused by this medicine, tell your doctor  Call your doctor for medical advice about side effects  You may report side effects to FDA at 9-844-FDA-2813  © Copyright [address] Information is for End User's use only and may not be sold, redistributed or otherwise used for commercial purposes  The above information is an  only  It is not intended as medical advice for individual conditions or treatments  Talk to your doctor, nurse or pharmacist before following any medical regimen to see if it is safe and effective for you

## 2021-03-04 NOTE — PHYSICAL THERAPY NOTE
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, subsequent encounter [S42 291K]  Closed fracture of head of right humerus, initial encounter [S42 291A]    Problem List  Patient Active Problem List   Diagnosis    Closed fracture of proximal end of right humerus with routine healing     Past Medical History  Past Medical History:   Diagnosis Date    Anemia     Shoulder fracture      Past Surgical History  Past Surgical History:   Procedure Laterality Date    CHOLECYSTECTOMY      FOREIGN BODY REMOVAL Left     foot    SHOULDER SURGERY Left       03/04/21 0807   PT Last Visit   PT Visit Date 03/04/21   Note Type   Note type Evaluation   Pain Assessment   Pain Assessment Tool 0-10   Pain Score 7   Pain Location/Orientation Orientation: Right;Location: Arm;Location: Shoulder   Home Living   Type of [address] [place] [address] Two level; Able to live on main level with bedroom/bathroom;Stairs to enter with rails  (2 BRIAN)   Bathroom Shower/Tub Tub/shower unit   Bathroom Toilet Standard   Bathroom Equipment Shower chair;Grab bars in shower   [address] Other (Comment)  (none per patient)   Additional Comments Pt ambulates without an AD  Prior Function   Level of [place] Independent with ADLs and functional mobility   Lives With Spouse; Other (Comment)  (children)   Receives Help From Family   ADL Assistance Needs assistance   IADLs Needs assistance   Falls in the last 6 months 1 to 4   Vocational Retired   Restrictions/Precautions   [place] Bearing Precautions Per Order Yes   RUE [place] Bearing Per Order NWB  (per ortho)   Braces or Orthoses Other (Comment)  (abduction sling)   Other Precautions Chair Alarm; Bed Alarm; Fall Risk;Pain;WBS   General   Family/Caregiver Present No   Cognition   Overall Cognitive Status [facility]   Arousal/Participation Alert Orientation Level Oriented X4   Memory Within functional limits   Following Commands Follows all commands and directions without difficulty   Comments Pt agreeable to PT    RLE Assessment   RLE Assessment WFL  (grossly: 4-/5)   LLE Assessment   LLE Assessment WFL  (grossly: 4-/5)   Light Touch   RLE Light Touch Grossly intact   LLE Light Touch Grossly intact   Bed Mobility   Rolling L 4  Minimal assistance   Additional items Assist x 1;HOB elevated; Increased time required;Verbal cues;LE management   Supine to Sit 4  Minimal assistance   Additional items Assist x 1;HOB elevated; Increased time required;Verbal cues;LE management   Transfers   Sit to Stand 4  Minimal assistance  (CG assist)   Additional items Assist x 1; Increased time required;Verbal cues   Stand to Sit 4  Minimal assistance  (CG assist)   Additional items Assist x 1; Armrests; Increased time required;Verbal cues   Ambulation/Elevation   Gait pattern Excessively slow; Step to;Short stride; Shuffling; Wide RANDALL   Gait Assistance 4  Minimal assist  (CG assist)   Additional items Assist x 1;Verbal cues   Assistive Device Other (Comment)  (left hand held assist)   Distance 25 feet   Stair Management Assistance 4  Minimal assist   Additional items Assist x 1;Verbal cues   Stair Management Technique Other (Comment)  (left hand held assist)   Number of Stairs 2  (8 inch practice step)   Balance   Static Sitting Fair +   Dynamic Sitting Fair   Static Standing Fair -   Dynamic Standing Poor +   Ambulatory Poor +   Endurance Deficit   Endurance Deficit Yes   Activity Tolerance   Activity Tolerance Patient tolerated treatment well   Nurse Made Aware Discussed case with DENIA Finn; post session pt was left seated in recliner in NAD, all belongings within reach, +chair alarm   Assessment   Prognosis Good   Problem List Decreased strength;Decreased endurance; Impaired balance;Decreased mobility;Orthopedic restrictions;Pain   Assessment Pt is 64year old female seen for PT evaluation s/p admit to [facility] on 3/3/2021 with Closed fracture of proximal end of right humerus with routine healing  PT consulted to assess pt's functional mobility and d/c needs  Order placed for PT eval and tx, with NWB on R UE order  Comorbidities affecting pt's physical performance at time of assessment include anemia, and shoulder fracture  PTA, pt was independent with all functional mobility without an AD  Personal factors affecting pt at time of IE include lives in 2 story house, stairs to enter home, inability to navigate community distances, unable to perform dynamic tasks in community, and positive fall history  Please find objective findings from PT assessment regarding body systems outlined above with impairments and limitations including weakness, impaired balance, decreased endurance, gait deviations, pain, decreased activity tolerance, decreased functional mobility tolerance, fall risk, and orthopedic restrictions  The following objective measures performed on IE also reveal limitations: Barthel Index: 50/100 and Modified [place]: 4 (moderate/severe disability)  Pt's clinical presentation is currently unstable/unpredictable seen in pt's presentation of need for ongoing medical management/monitoring and pt requires cues/assist for safety with functional mobility  Pt to benefit from continued PT tx to address deficits as defined above and maximize level of functional independent mobility and consistency  From PT/mobility standpoint, recommendation at time of d/c would be Home with family support pending progress in order to facilitate return to PLOF     Barriers to Discharge None   Goals   Patient Goals to return home   STG Expiration Date 03/14/21   Short Term Goal #1 In 7-10 days: Increase bilateral LE strength 1/2 grade to facilitate independent mobility, Perform all bed mobility tasks modified independent to decrease caregiver burden, Perform all transfers modified independent to improve independence, Ambulate > 150 ft  with least restrictive assistive device modified independent w/o LOB and w/ normalized gait pattern 100% of the time, Navigate 2 stairs modified independent with unilateral handrail to facilitate return to previous living environment and Increase all balance 1/2 grade to decrease risk for falls   Plan   Treatment/Interventions Functional transfer training;LE strengthening/ROM; Elevations; Therapeutic exercise; Endurance training;Patient/family training;Bed mobility;Gait training;Spoke to nursing;OT   PT Frequency 5x/wk   Recommendation   PT Discharge Recommendation Return to previous environment with social support   PT - OK to Discharge Yes  (when medically cleared)   AM-PAC Basic Mobility Inpatient   Turning in Bed Without Bedrails 3   Lying on Back to Sitting on Edge of Flat Bed 3   Moving Bed to Chair 3   Standing Up From Chair 3   Walk in Room 3   Climb 3-5 Stairs 3   Basic Mobility Inpatient Raw Score 18   Basic Mobility Standardized Score 41 05   Modified [place] Scale   Modified [place] Scale 4   Barthel Index   Feeding 5   Bathing 0   Grooming Score 0   Dressing Score 5   Bladder Score 10   Bowels Score 10   Toilet Use Score 5   Transfers (Bed/Chair) Score 10   Mobility (Level Surface) Score 0   Stairs Score 5   Barthel Index Score 50     Rocky Lowe, PT, DPT

## 2021-03-04 NOTE — PLAN OF CARE
Problem: Potential for Falls  Goal: Patient will remain free of falls  Description: INTERVENTIONS:  - Assess patient frequently for physical needs  -  Identify cognitive and physical deficits and behaviors that affect risk of falls    -  [place] fall precautions as indicated by assessment   - Educate patient/family on patient safety including physical limitations  - Instruct patient to call for assistance with activity based on assessment  - Modify environment to reduce risk of injury  - Consider OT/PT consult to assist with strengthening/mobility  Outcome: Progressing     Problem: PAIN - ADULT  Goal: Verbalizes/displays adequate comfort level or baseline comfort level  Description: Interventions:  - Encourage patient to monitor pain and request assistance  - Assess pain using appropriate pain scale  - Administer analgesics based on type and severity of pain and evaluate response  - Implement non-pharmacological measures as appropriate and evaluate response  - Consider cultural and social influences on pain and pain management  - Notify physician/advanced practitioner if interventions unsuccessful or patient reports new pain  Outcome: Progressing     Problem: INFECTION - ADULT  Goal: Absence or prevention of progression during hospitalization  Description: INTERVENTIONS:  - Assess and monitor for signs and symptoms of infection  - Monitor lab/diagnostic results  - Monitor all insertion sites, i e  indwelling lines, tubes, and drains  - Monitor endotracheal if appropriate and nasal secretions for changes in amount and color  - [place] appropriate cooling/warming therapies per order  - Administer medications as ordered  - Instruct and encourage patient and family to use good hand hygiene technique  - Identify and instruct in appropriate isolation precautions for identified infection/condition  Outcome: Progressing  Goal: Absence of fever/infection during neutropenic period  Description: INTERVENTIONS:  - Monitor WBC    Outcome: Progressing     Problem: SAFETY ADULT  Goal: Patient will remain free of falls  Description: INTERVENTIONS:  - Assess patient frequently for physical needs  -  Identify cognitive and physical deficits and behaviors that affect risk of falls    -  [place] fall precautions as indicated by assessment   - Educate patient/family on patient safety including physical limitations  - Instruct patient to call for assistance with activity based on assessment  - Modify environment to reduce risk of injury  - Consider OT/PT consult to assist with strengthening/mobility  Outcome: Progressing  Goal: Maintain or return to baseline ADL function  Description: INTERVENTIONS:  -  Assess patient's ability to carry out ADLs; assess patient's baseline for ADL function and identify physical deficits which impact ability to perform ADLs (bathing, care of mouth/teeth, toileting, grooming, dressing, etc )  - Assess/evaluate cause of self-care deficits   - Assess range of motion  - Assess patient's mobility; develop plan if impaired  - Assess patient's need for assistive devices and provide as appropriate  - Encourage maximum independence but intervene and supervise when necessary  - Involve family in performance of ADLs  - Assess for home care needs following discharge   - Consider OT consult to assist with ADL evaluation and planning for discharge  - Provide patient education as appropriate  Outcome: Progressing  Goal: Maintain or return mobility status to optimal level  Description: INTERVENTIONS:  - Assess patient's baseline mobility status (ambulation, transfers, stairs, etc )    - Identify cognitive and physical deficits and behaviors that affect mobility  - Identify mobility aids required to assist with transfers and/or ambulation (gait belt, sit-to-stand, lift, walker, cane, etc )  - [place] fall precautions as indicated by assessment  - Record patient progress and toleration of activity level on Mobility SBAR; progress patient to next Phase/Stage  - Instruct patient to call for assistance with activity based on assessment  - Consider rehabilitation consult to assist with strengthening/weightbearing, etc   Outcome: Progressing     Problem: DISCHARGE PLANNING  Goal: Discharge to home or other facility with appropriate resources  Description: INTERVENTIONS:  - Identify barriers to discharge w/patient and caregiver  - Arrange for needed discharge resources and transportation as appropriate  - Identify discharge learning needs (meds, wound care, etc )  - Arrange for interpretive services to assist at discharge as needed  - Refer to Case Management Department for coordinating discharge planning if the patient needs post-hospital services based on physician/advanced practitioner order or complex needs related to functional status, cognitive ability, or social support system  Outcome: Progressing     Problem: Knowledge Deficit  Goal: Patient/family/caregiver demonstrates understanding of disease process, treatment plan, medications, and discharge instructions  Description: Complete learning assessment and assess knowledge base  Interventions:  - Provide teaching at level of understanding  - Provide teaching via preferred learning methods  Outcome: Progressing     Problem: Nutrition/Hydration-ADULT  Goal: Nutrient/Hydration intake appropriate for improving, restoring or maintaining nutritional needs  Description: Monitor and assess patient's nutrition/hydration status for malnutrition  Collaborate with interdisciplinary team and initiate plan and interventions as ordered  Monitor patient's weight and dietary intake as ordered or per policy  Utilize nutrition screening tool and intervene as necessary  Determine patient's food preferences and provide high-protein, high-caloric foods as appropriate       INTERVENTIONS:  - Monitor oral intake, urinary output, labs, and treatment plans  - Assess nutrition and hydration status and recommend course of action  - Evaluate amount of meals eaten  - Assist patient with eating if necessary   - Allow adequate time for meals  - Recommend/ encourage appropriate diets, oral nutritional supplements, and vitamin/mineral supplements  - Order, calculate, and assess calorie counts as needed  - Recommend, monitor, and adjust tube feedings and TPN/PPN based on assessed needs  - Assess need for intravenous fluids  - Provide specific nutrition/hydration education as appropriate  - Include patient/family/caregiver in decisions related to nutrition  Outcome: Progressing     Problem: NEUROSENSORY - ADULT  Goal: Achieves stable or improved neurological status  Description: INTERVENTIONS  - Monitor and report changes in neurological status  - Monitor vital signs such as temperature, blood pressure, glucose, and any other labs ordered   - Initiate measures to prevent increased intracranial pressure  - Monitor for seizure activity and implement precautions if appropriate      Outcome: Progressing  Goal: Remains free of injury related to seizures activity  Description: INTERVENTIONS  - Maintain airway, patient safety  and administer oxygen as ordered  - Monitor patient for seizure activity, document and report duration and description of seizure to physician/advanced practitioner  - If seizure occurs,  ensure patient safety during seizure  - Reorient patient post seizure  - Seizure pads on all 4 side rails  - Instruct patient/family to notify RN of any seizure activity including if an aura is experienced  - Instruct patient/family to call for assistance with activity based on nursing assessment  - Administer anti-seizure medications if ordered    Outcome: Progressing  Goal: Achieves maximal functionality and self care  Description: INTERVENTIONS  - Monitor swallowing and airway patency with patient fatigue and changes in neurological status  - Encourage and assist patient to increase activity and self care     - Encourage visually impaired, hearing impaired and aphasic patients to use assistive/communication devices  Outcome: Progressing     Problem: RESPIRATORY - ADULT  Goal: Achieves optimal ventilation and oxygenation  Description: INTERVENTIONS:  - Assess for changes in respiratory status  - Assess for changes in mentation and behavior  - Position to facilitate oxygenation and minimize respiratory effort  - Oxygen administered by appropriate delivery if ordered  - Initiate smoking cessation education as indicated  - Encourage broncho-pulmonary hygiene including cough, deep breathe, Incentive Spirometry  - Assess the need for suctioning and aspirate as needed  - Assess and instruct to report SOB or any respiratory difficulty  - Respiratory Therapy support as indicated  Outcome: Progressing     Problem: GENITOURINARY - ADULT  Goal: Maintains or returns to baseline urinary function  Description: INTERVENTIONS:  - Assess urinary function  - Encourage oral fluids to ensure adequate hydration if ordered  - Administer IV fluids as ordered to ensure adequate hydration  - Administer ordered medications as needed  - Offer frequent toileting  - Follow urinary retention protocol if ordered  Outcome: Progressing  Goal: Absence of urinary retention  Description: INTERVENTIONS:  - Assess patients ability to void and empty bladder  - Monitor I/O  - Bladder scan as needed  - Discuss with physician/AP medications to alleviate retention as needed  - Discuss catheterization for long term situations as appropriate  Outcome: Progressing  Goal: Urinary catheter remains patent  Description: INTERVENTIONS:  - Assess patency of urinary catheter  - If patient has a chronic herzog, consider changing catheter if non-functioning  - Follow guidelines for intermittent irrigation of non-functioning urinary catheter  Outcome: Progressing     Problem: METABOLIC, FLUID AND ELECTROLYTES - ADULT  Goal: Electrolytes maintained within normal limits  Description: INTERVENTIONS:  - Monitor labs and assess patient for signs and symptoms of electrolyte imbalances  - Administer electrolyte replacement as ordered  - Monitor response to electrolyte replacements, including repeat lab results as appropriate  - Instruct patient on fluid and nutrition as appropriate  Outcome: Progressing  Goal: Fluid balance maintained  Description: INTERVENTIONS:  - Monitor labs   - Monitor I/O and WT  - Instruct patient on fluid and nutrition as appropriate  - Assess for signs & symptoms of volume excess or deficit  Outcome: Progressing  Goal: Glucose maintained within target range  Description: INTERVENTIONS:  - Monitor Blood Glucose as ordered  - Assess for signs and symptoms of hyperglycemia and hypoglycemia  - Administer ordered medications to maintain glucose within target range  - Assess nutritional intake and initiate nutrition service referral as needed  Outcome: Progressing     Problem: SKIN/TISSUE INTEGRITY - ADULT  Goal: Skin integrity remains intact  Description: INTERVENTIONS  - Identify patients at risk for skin breakdown  - Assess and monitor skin integrity  - Assess and monitor nutrition and hydration status  - Monitor labs (i e  albumin)  - Assess for incontinence   - Turn and reposition patient  - Assist with mobility/ambulation  - Relieve pressure over bony prominences  - Avoid friction and shearing  - Provide appropriate hygiene as needed including keeping skin clean and dry  - Evaluate need for skin moisturizer/barrier cream  - Collaborate with interdisciplinary team (i e  Nutrition, Rehabilitation, etc )   - Patient/family teaching  Outcome: Progressing  Goal: Incision(s), wounds(s) or drain site(s) healing without S/S of infection  Description: INTERVENTIONS  - Assess and document risk factors for skin impairment   - Assess and document dressing, incision, wound bed, drain sites and surrounding tissue  - Consider nutrition services referral as needed  - Oral mucous membranes remain intact  - Provide patient/ family education  Outcome: Progressing  Goal: Oral mucous membranes remain intact  Description: INTERVENTIONS  - Assess oral mucosa and hygiene practices  - Implement preventative oral hygiene regimen  - Implement oral medicated treatments as ordered  - Initiate Nutrition services referral as needed  Outcome: Progressing     Problem: MUSCULOSKELETAL - ADULT  Goal: Maintain or return mobility to safest level of function  Description: INTERVENTIONS:  - Assess patient's ability to carry out ADLs; assess patient's baseline for ADL function and identify physical deficits which impact ability to perform ADLs (bathing, care of mouth/teeth, toileting, grooming, dressing, etc )  - Assess/evaluate cause of self-care deficits   - Assess range of motion  - Assess patient's mobility  - Assess patient's need for assistive devices and provide as appropriate  - Encourage maximum independence but intervene and supervise when necessary  - Involve family in performance of ADLs  - Assess for home care needs following discharge   - Consider OT consult to assist with ADL evaluation and planning for discharge  - Provide patient education as appropriate  Outcome: Progressing  Goal: Maintain proper alignment of affected body part  Description: INTERVENTIONS:  - Support, maintain and protect limb and body alignment  - Provide patient/ family with appropriate education  Outcome: Progressing

## 2021-03-05 ENCOUNTER — TRANSITIONAL CARE MANAGEMENT (OUTPATIENT)
Dept: INTERNAL MEDICINE CLINIC | Facility: CLINIC | Age: 61
End: 2021-03-05

## 2021-03-05 NOTE — UTILIZATION REVIEW
Notification of Discharge  This is a Notification of Discharge from our facility [address]  Please be advised that this patient has been discharge from our facility  Below you will find the admission and discharge date and time including the patients disposition  PRESENTATION DATE: 3/3/2021  9:17 AM  OBS ADMISSION DATE:   IP ADMISSION DATE: 3/3/21 1734   DISCHARGE DATE: 3/4/2021  3:13 PM  DISPOSITION: Home/Self Care Home/Self Care   Admission Orders listed below:  Admission Orders (From admission, onward)     Ordered        03/03/21 [address] Tegan Adams  Once                   Please contact the UR Department if additional information is required to close this patient's authorization/case  [facility] Utilization Review Department  Main: [phone] x carefully listen to the prompts  All voicemails are confidential   [email or web address]  Send all requests for admission clinical reviews, approved or denied determinations and any other requests to dedicated fax number below belonging to the campus where the patient is receiving treatment   List of dedicated fax numbers:  [address] DENIALS (Administrative/Medical Necessity) [phone]   [address] (Maternity/NICU/Pediatrics) [phone]   Jose Forrester [phone]   Radha Promise [phone]   [facility] [phone]   Ofelia Petty [facility] [address] [place] [address] [phone]   [facility]  [phone]   [address] [facility]  [address] [address] [address] [facility] [phone]

## 2021-03-05 NOTE — TELEPHONE ENCOUNTER
I spoke to the patient earlier and told her she should take the ferrous gluconate and she stated that she still had a good amount and did not need a prescription at this time  Thank you

## 2021-03-15 ENCOUNTER — EPISODE CHANGES (OUTPATIENT)
Dept: CASE MANAGEMENT | Facility: HOSPITAL | Age: 61
End: 2021-03-15

## 2021-03-15 ENCOUNTER — OFFICE VISIT (OUTPATIENT)
Dept: INTERNAL MEDICINE CLINIC | Facility: CLINIC | Age: 61
End: 2021-03-15
Payer: COMMERCIAL

## 2021-03-15 ENCOUNTER — APPOINTMENT (OUTPATIENT)
Dept: LAB | Facility: CLINIC | Age: 61
End: 2021-03-15
Payer: COMMERCIAL

## 2021-03-15 VITALS
DIASTOLIC BLOOD PRESSURE: 78 MMHG | BODY MASS INDEX: 47.95 KG/M2 | SYSTOLIC BLOOD PRESSURE: 138 MMHG | HEART RATE: 70 BPM | HEIGHT: 61 IN | TEMPERATURE: 98.5 F | WEIGHT: 254 LBS

## 2021-03-15 DIAGNOSIS — Z12.12 SCREENING FOR COLORECTAL CANCER: ICD-10-CM

## 2021-03-15 DIAGNOSIS — Z28.21 INFLUENZA VACCINATION DECLINED: ICD-10-CM

## 2021-03-15 DIAGNOSIS — S42.294D OTHER CLOSED NONDISPLACED FRACTURE OF PROXIMAL END OF RIGHT HUMERUS WITH ROUTINE HEALING, SUBSEQUENT ENCOUNTER: Primary | ICD-10-CM

## 2021-03-15 DIAGNOSIS — D50.8 IRON DEFICIENCY ANEMIA SECONDARY TO INADEQUATE DIETARY IRON INTAKE: ICD-10-CM

## 2021-03-15 DIAGNOSIS — E66.01 MORBID OBESITY (HCC): ICD-10-CM

## 2021-03-15 DIAGNOSIS — Z12.11 SCREENING FOR COLORECTAL CANCER: ICD-10-CM

## 2021-03-15 DIAGNOSIS — Z53.20 HIV SCREENING DECLINED: ICD-10-CM

## 2021-03-15 PROBLEM — M12.812 ROTATOR CUFF ARTHROPATHY, LEFT: Status: ACTIVE | Noted: 2017-06-21

## 2021-03-15 LAB
ANISOCYTOSIS BLD QL SMEAR: PRESENT
BASOPHILS # BLD MANUAL: 0 THOUSAND/UL (ref 0–0.1)
BASOPHILS NFR MAR MANUAL: 0 % (ref 0–1)
EOSINOPHIL # BLD MANUAL: 0.26 THOUSAND/UL (ref 0–0.4)
EOSINOPHIL NFR BLD MANUAL: 3 % (ref 0–6)
ERYTHROCYTE [DISTWIDTH] IN BLOOD BY AUTOMATED COUNT: 21.5 % (ref 11.6–15.1)
FERRITIN SERPL-MCNC: 37 NG/ML (ref 8–388)
GIANT PLATELETS BLD QL SMEAR: PRESENT
HCT VFR BLD AUTO: 30.6 % (ref 34.8–46.1)
HGB BLD-MCNC: 8 G/DL (ref 11.5–15.4)
HYPERCHROMIA BLD QL SMEAR: PRESENT
IRON SATN MFR SERPL: 5 %
IRON SERPL-MCNC: 18 UG/DL (ref 50–170)
LYMPHOCYTES # BLD AUTO: 1.49 THOUSAND/UL (ref 0.6–4.47)
LYMPHOCYTES # BLD AUTO: 17 % (ref 14–44)
MCH RBC QN AUTO: 18.6 PG (ref 26.8–34.3)
MCHC RBC AUTO-ENTMCNC: 26.1 G/DL (ref 31.4–37.4)
MCV RBC AUTO: 71 FL (ref 82–98)
MICROCYTES BLD QL AUTO: PRESENT
MONOCYTES # BLD AUTO: 0.7 THOUSAND/UL (ref 0–1.22)
MONOCYTES NFR BLD: 8 % (ref 4–12)
NEUTROPHILS # BLD MANUAL: 5.77 THOUSAND/UL (ref 1.85–7.62)
NEUTS SEG NFR BLD AUTO: 66 % (ref 43–75)
NRBC BLD AUTO-RTO: 0 /100 WBCS
PLATELET # BLD AUTO: 569 THOUSANDS/UL (ref 149–390)
PLATELET BLD QL SMEAR: ABNORMAL
PMV BLD AUTO: 10 FL (ref 8.9–12.7)
POIKILOCYTOSIS BLD QL SMEAR: PRESENT
POLYCHROMASIA BLD QL SMEAR: PRESENT
RBC # BLD AUTO: 4.3 MILLION/UL (ref 3.81–5.12)
RBC MORPH BLD: PRESENT
TARGETS BLD QL SMEAR: PRESENT
TIBC SERPL-MCNC: 366 UG/DL (ref 250–450)
VARIANT LYMPHS # BLD AUTO: 6 %
WBC # BLD AUTO: 8.74 THOUSAND/UL (ref 4.31–10.16)

## 2021-03-15 PROCEDURE — 36415 COLL VENOUS BLD VENIPUNCTURE: CPT

## 2021-03-15 PROCEDURE — 83550 IRON BINDING TEST: CPT

## 2021-03-15 PROCEDURE — 85007 BL SMEAR W/DIFF WBC COUNT: CPT

## 2021-03-15 PROCEDURE — 82728 ASSAY OF FERRITIN: CPT

## 2021-03-15 PROCEDURE — 1111F DSCHRG MED/CURRENT MED MERGE: CPT | Performed by: NURSE PRACTITIONER

## 2021-03-15 PROCEDURE — 83540 ASSAY OF IRON: CPT

## 2021-03-15 PROCEDURE — 99495 TRANSJ CARE MGMT MOD F2F 14D: CPT | Performed by: NURSE PRACTITIONER

## 2021-03-15 PROCEDURE — 85027 COMPLETE CBC AUTOMATED: CPT

## 2021-03-15 NOTE — ASSESSMENT & PLAN NOTE
Patient's BMI in the office today is 47  Information was provided to the patient on her after visit summary regarding healthy eating and weight management

## 2021-03-15 NOTE — ASSESSMENT & PLAN NOTE
The patient has a history of iron deficiency anemia  Her most recent iron level was 20  She has not seen Hematology for this issue  She is taking over-the-counter   Iron supplements  Repeat CBC and iron panel have been ordered  I have referred the patient to Hematology for further recommendations    Component      Latest Ref Rng & Units 2/15/2021 3/4/2021   WBC      4 31 - 10 16 Thousand/uL 8 98 13 86 (H)   Red Blood Cell Count      3 81 - 5 12 Million/uL 5 33 (H) 4 21   Hemoglobin      11 5 - 15 4 g/dL 9 8 (L) 7 9 (L)   HCT      34 8 - 46 1 % 37 0 29 4 (L)   MCV      82 - 98 fL 69 (L) 70 (L)   MCH      26 8 - 34 3 pg 18 4 (L) 18 8 (L)   MCHC      31 4 - 37 4 g/dL 26 5 (L) 26 9 (L)   RDW      11 6 - 15 1 % 21 2 (H) 20 7 (H)

## 2021-03-15 NOTE — PROGRESS NOTES
INTERNAL MEDICINE TRANSITION OF CARE OFFICE VISIT  [facility] - [facility]    NAME: Kye Perez  AGE: 64 y o  SEX: female  : 1960     DATE: 3/15/2021     Assessment and Plan:     Problem List Items Addressed This Visit        Musculoskeletal and Integument    Closed fracture of proximal end of right humerus with routine healing - Primary       The patient had a Right Reverse Total Shoulder Arthroplasty   performed on   Patient is wearing her sling as recommended  She has not yet started physical therapy  She is taking Tylenol and oxycodone as needed  She does have an upcoming appointment with the orthopedic provider tomorrow            Other    Morbid obesity ([facility] [address] )      Patient's BMI in the office today is 47  Information was provided to the patient on her after visit summary regarding healthy eating and weight management  Iron deficiency anemia secondary to inadequate dietary iron intake       The patient has a history of iron deficiency anemia  Her most recent iron level was 20  She has not seen Hematology for this issue  She is taking over-the-counter   Iron supplements  Repeat CBC and iron panel have been ordered  I have referred the patient to Hematology for further recommendations    Component      Latest Ref Rng & Units 2/15/2021 3/4/2021   WBC      4 31 - 10 16 Thousand/uL 8 98 13 86 (H)   Red Blood Cell Count      3 81 - 5 12 Million/uL 5 33 (H) 4 21   Hemoglobin      11 5 - 15 4 g/dL 9 8 (L) 7 9 (L)   HCT      34 8 - 46 1 % 37 0 29 4 (L)   MCV      82 - 98 fL 69 (L) 70 (L)   MCH      26 8 - 34 3 pg 18 4 (L) 18 8 (L)   MCHC      31 4 - 37 4 g/dL 26 5 (L) 26 9 (L)   RDW      11 6 - 15 1 % 21 2 (H) 20 7 (H)              Relevant Orders    CBC and differential    Iron Panel (Includes Ferritin, Iron Sat%, Iron, and TIBC)    Ambulatory referral to Hematology / Oncology      Other Visit Diagnoses     Screening for colorectal cancer        Relevant Orders    Ambulatory referral to Gastroenterology    Cologuard    Influenza vaccination declined        HIV screening declined               Transitional Care Management Review:     Una Boo is a 64 y o  female here for TCM follow-up    During the TCM phone call patient stated:    TCM Call (since 2/12/2021)     Date and time call was made  3/5/2021 11:36 AM    Hospital care reviewed  Records reviewed    Patient was hospitialized at  [facility]        Date of Admission  03/03/21    Date of discharge  03/04/21    Diagnosis  Closed fracture of proximal end of right humerus>ARTHROPLASTY SHOULDER REVERSE     Disposition  Home    Were the patients medications reviewed and updated  No    Current Symptoms  -- (Comment)  pain-mild      TCM Call (since 2/12/2021)     Post hospital issues  None    Scheduled for follow up? Yes    Patients specialists  Other (comment)    Other specialists names  [facility] [phone]    Did you obtain your prescribed medications  Yes (Comment)  PERCOCET    Do you need help managing your prescriptions or medications  No    Is transportation to your appointment needed  No    I have advised the patient to call PCP with any new or worsening symptoms  Rahul Suero LPN    Living Arrangements  Spouse or Significiant other    Are you recieving any outpatient services  Yes    What type of services  PT    Are you recieving home care services  No    Interperter language line needed  No    Counseling  Patient    Counseling topics  Importance of RX compliance           HPI:     Christina McSherrystown to the office for a transition of care management visit  The patient had right shoulder surgery performed on March 3rd  Overall the patient feels well  She is taking over-the-counter pain medication as instructed  She has also been prescribed oxycodone which she takes on occasion    Patient declined HIV screening as well as hepatitis-C screening the office today  The patient declined a flu shot  Information was provided to the patient regarding the COVID vaccine  CBC and iron panel was ordered  I refer the patient to hematology for her chronic anemia  She will follow-up with the orthopedic group  She does have an upcoming appointment scheduled with Dr Jorje Ramachandran for follow-up    The following portions of the patient's history were reviewed and updated as appropriate: allergies, current medications, past family history, past medical history, past social history, past surgical history and problem list      Review of Systems:     Review of Systems   Constitutional: Positive for fatigue  Negative for activity change and fever  HENT: Negative for congestion, hearing loss, rhinorrhea, trouble swallowing and voice change  Eyes: Negative for photophobia, pain, discharge and visual disturbance  Respiratory: Negative for cough, chest tightness and shortness of breath  Cardiovascular: Negative for chest pain, palpitations and leg swelling  Gastrointestinal: Negative for abdominal pain, blood in stool, constipation, nausea and vomiting  Endocrine: Negative for cold intolerance and heat intolerance  Genitourinary: Negative for difficulty urinating, frequency, hematuria, urgency, vaginal bleeding and vaginal discharge  Musculoskeletal: Negative for arthralgias and myalgias  Right shoulder pain   Skin: Negative  Neurological: Negative for dizziness, weakness, numbness and headaches  Psychiatric/Behavioral: Negative for decreased concentration  The patient is not nervous/anxious           Problem List:     Patient Active Problem List   Diagnosis    Closed fracture of proximal end of right humerus with routine healing    Morbid obesity ([address] )    Rotator cuff arthropathy, left    Iron deficiency anemia secondary to inadequate dietary iron intake        Objective:     /78 (BP Location: Left arm, Patient Position: Sitting) Comment: fdsf  Pulse 70   Temp 98 5 °F (36 9 °C) (Tympanic) Comment: gdf  Ht 5' 1" (1 549 m)   Wt 115 kg (254 lb)   BMI 47 99 kg/m²     Physical Exam  Constitutional:       General: She is not in acute distress  Appearance: She is well-developed  She is obese  HENT:      Head: Normocephalic and atraumatic  Eyes:      Pupils: Pupils are equal, round, and reactive to light  Neck:      Musculoskeletal: Normal range of motion  Cardiovascular:      Rate and Rhythm: Normal rate and regular rhythm  Pulses: Normal pulses  Heart sounds: Normal heart sounds  No murmur  Pulmonary:      Effort: Pulmonary effort is normal  No respiratory distress  Breath sounds: Normal breath sounds  No wheezing  Musculoskeletal:      Right shoulder: She exhibits decreased range of motion, tenderness, swelling and pain  Skin:     General: Skin is warm and dry  Coloration: Skin is pale  Neurological:      General: No focal deficit present  Mental Status: She is alert and oriented to person, place, and time  Psychiatric:         Mood and Affect: Mood normal          Behavior: Behavior normal          Thought Content: Thought content normal          Judgment: Judgment normal          Laboratory Results: I have personally reviewed the pertinent laboratory results/reports     Radiology/Other Diagnostic Testing Results: I have personally reviewed pertinent reports  Xr Shoulder 2+ Vw Right    Result Date: 3/7/2021  C-ARM - right shoulder INDICATION: Closed fracture of head of right humerus, initial encounter  Procedure guidance  COMPARISON:  None TECHNIQUE: FLUOROSCOPY TIME:   11 8 sec 6 FLUOROSCOPIC IMAGES FINDINGS: Fluoroscopic guidance provided for procedure guidance  Osseous and soft tissue detail limited by technique  Fluoroscopic guidance provided for procedure guidance  Please refer to the separate procedure notes for additional details    Workstation performed: [id number]      Bedside Procedure    Result Date: 3/4/2021  1 2 840 [id number] 2 323 [id number] 3       Current Medications:     Outpatient Medications Prior to Visit   Medication Sig Dispense Refill    acetaminophen (TYLENOL) 325 mg tablet Take 975 mg by mouth every 6 (six) hours as needed for mild pain (last dose 8pm last night)      ferrous gluconate (FERGON) 240 (27 FE) MG tablet Take 1 tablet (240 mg total) by mouth daily 30 tablet 1    oxyCODONE-acetaminophen (PERCOCET) 5-325 mg per tablet Take 1 tablet by mouth every 4 (four) hours as needed for moderate pain or severe painMax Daily Amount: 6 tablets 20 tablet 0    VITAMIN D, CHOLECALCIFEROL, PO Take 1 tablet by mouth once a week       No facility-administered medications prior to visit          FRANCESCA Callahan  [facility]

## 2021-03-15 NOTE — PATIENT INSTRUCTIONS
COVID vaccine [id number] ([phone])          Discharge Instructions:   · Nonweightbearing Right upper extremity with shoulder immobilizer intact  Can remove sling for ROM exercises of elbow, wrist and fingers and for hygenic purposes  No active shoulder motion at this time  · Keep dressings clean and dry at all times   · Complete DVT prophylaxis as prescribed   · Physical therapy after first post-op visit  Remove sling for elbow, wrist and finger ROM exercises  Follow-up in office 2021 with Dr Arsenio Moore:   An iron-rich diet includes foods that are good sources of iron  People need extra iron during childhood, adolescence (teenage years), and pregnancy  Iron is a mineral that your body needs to make hemoglobin  Hemoglobin is part of your blood and helps carry oxygen from your lungs to the rest of your body  You may need to follow this diet to treat or prevent a low blood iron level or iron deficiency anemia  DISCHARGE INSTRUCTIONS:   Daily iron needs:   Males:      3to 1years old: 7 mg    3to 6years old: 10 mg    5to 15years old: 8 mg    15to 25years old: 11 mg    19 years and older: 8 mg    Females:      3to 1years old: 7 mg    3to 6years old: 10 mg    5to 15years old: 8 mg    15to 25years old: 15 mg    19 to 50 years: 18 mg    Over 46years old: 8 mg    Pregnant women:  27 mg    Foods that contain iron:   Meat, fish, and poultry are good sources of iron  They contain heme iron, a form of iron that your body absorbs very well  Fruit, vegetables, eggs, and grains such as pasta, rice, and cereal also contain iron  They contain nonheme iron, a form of iron that is not absorbed as well as heme iron  You can absorb more iron from these foods by eating a food that is high in vitamin C at the same time  You can also absorb more nonheme iron by eating a food from the meat, fish, and poultry group at the same time      Fish and shellfish contain some mercury, a metal that can be harmful to the body  Children and unborn babies are at higher risk for harm caused by mercury  Children and pregnant women should avoid eating fish high in mercury, such as shark and swordfish  They should also eat only fish that are lower in mercury, such as salmon, canned light tuna, and catfish  Limit the amount of low-mercury fish and shellfish you eat to less than 12 ounces per week  Iron-rich foods:   Foods that contain 2 mg or more per serving:      3 ounces of cooked beef (carol, eye of round) or cooked turkey (dark meat)    ½ cup of beans (black, kidney, or lentil, or soybeans)    ½ cup of tofu    1 medium baked potato    1 cup of cooked artichoke or cooked spinach    ¾ cup of instant oatmeal    1 cup of corn flakes    Foods that contain 1 to 2 mg per serving:      3 ounces of chicken    3 ounces of pork    3 ounces of turkey (light meat)    3 ounces of light tuna    ½ cup of seedless, packed raisins    1 slice of whole-wheat or white bread    Good sources of vitamin C:  Eat a serving of vitamin C with any iron-rich food to help your body absorb more iron  The following fruits and vegetables are good sources of vitamin C:  1 cup of fresh orange juice (124 mg) or pink grapefruit juice (83 mg)    1 cup of strawberries (106 mg)    1 cup of diced cantaloupe (68 mg)     1 cup of sweet yellow pepper (283 mg)    1 cup of fresh, boiled broccoli (116 mg) or cooked brussels sprouts (97 mg)    1 cup of kale (53 mg)    1 cup of tomato juice (45 mg)    Other guidelines to follow:   Tea and coffee can decrease the amount of iron that your body absorbs from iron-rich foods  Drink coffee and tea separately from meals that contain iron-rich foods  Children over the age of 1 year only need about 24 ounces of cow's milk each day  When children drink too much milk, they may eat fewer iron-rich foods  This may cause them to have a low level of iron in their blood  Risks:   If you do not eat iron-rich foods and vitamin C every day, you may have low blood iron levels  This may lead to iron deficiency anemia, especially during periods when your body needs extra iron  Iron deficiency anemia may cause problems with your child's growth and development  If you have iron deficiency anemia, you may develop other health problems  © Copyright [address] Information is for End User's use only and may not be sold, redistributed or otherwise used for commercial purposes  All illustrations and images included in CareNotes® are the copyrighted property of [employer]  or [facility] Victor Manuel Staton   The above information is an  only  It is not intended as medical advice for individual conditions or treatments  Talk to your doctor, nurse or pharmacist before following any medical regimen to see if it is safe and effective for you  Cigarette Smoking and Your Health   AMBULATORY CARE:   Risks to your health if you smoke:  Nicotine and other chemicals found in tobacco and e-cigarettes can damage every cell in your body  Even if you are a light smoker, you have an increased risk for cancer, heart disease, and lung disease  If you are pregnant or have diabetes, smoking increases your risk for complications  Nicotine can affect an adolescent's developing brain  This can lead to trouble thinking, learning, or paying attention  Benefits to your health if you stop smoking:   · You decrease respiratory symptoms such as coughing, wheezing, and shortness of breath  · You reduce your risk for cancers of the lung, mouth, throat, kidney, bladder, pancreas, stomach, and cervix  If you already have cancer, you increase the benefits of chemotherapy  You also reduce your risk for cancer returning or a second cancer from developing  · You reduce your risk for heart disease, blood clots, heart attack, and stroke      · You reduce your risk for lung infections, and diseases such as pneumonia, asthma, chronic bronchitis, and emphysema  · Your circulation improves  More oxygen can be delivered to your body  If you have diabetes, you lower your risk for complications, such as kidney, artery, and eye diseases  You also lower your risk for nerve damage  Nerve damage can lead to amputations, poor vision, and blindness  · You improve your body's ability to heal and to fight infections  · An adolescent can help his or her brain and body develop in a healthy way  Talk to your adolescent about all the health risks of nicotine  If you can, start talking about nicotine when your child is younger than 12 years  This may make it easier for him or her not to start using nicotine as a teenager or adult  Explain to him or her that it is best never to start  It can be hard to try to quit later  Benefits to the health of others if you stop smoking:  Tobacco is harmful to nonsmokers who breathe in your secondhand smoke  The following are ways the health of others around you may improve when you stop smoking:  · You lower the risks for lung cancer and heart disease in nonsmoking adults  · If you are pregnant, you lower the risk for miscarriage, early delivery, low birth weight, and stillbirth  You also lower your baby's risk for SIDS, obesity, developmental delay, and neurobehavioral problems, such as ADHD  · If you have children, you lower their risk for ear infections, colds, pneumonia, bronchitis, and asthma  Follow up with your doctor as directed:  Write down your questions so you remember to ask them during your visits  For more information and support to stop smoking:   · Smokefree  gov  Phone: 7- 813 - 546-2246  Web Address: www [employer]  [address] Information is for End User's use only and may not be sold, redistributed or otherwise used for commercial purposes   All illustrations and images included in CareNotes® are the copyrighted property of [employer]  or [facility]  The above information is an  only  It is not intended as medical advice for individual conditions or treatments  Talk to your doctor, nurse or pharmacist before following any medical regimen to see if it is safe and effective for you  Weight Management   WHAT YOU NEED TO KNOW:   Being overweight increases your risk of health conditions such as heart disease, high blood pressure, type 2 diabetes, and certain types of cancer  It can also increase your risk for osteoarthritis, sleep apnea, and other respiratory problems  Aim for a slow, steady weight loss  Even a small amount of weight loss can lower your risk of health problems  DISCHARGE INSTRUCTIONS:   How to lose weight safely:  A safe and healthy way to lose weight is to eat fewer calories and get regular exercise  · You can lose up about 1 pound a week by decreasing the number of calories you eat by 500 calories each day  You can decrease calories by eating smaller portion sizes or by cutting out high-calorie foods  Read labels to find out how many calories are in the foods you eat  · You can also burn calories with exercise such as walking, swimming, or biking  You will be more likely to keep weight off if you make these changes part of your lifestyle  Exercise at least 30 minutes per day on most days of the week  You can also fit in more physical activity by taking the stairs instead of the elevator or parking farther away from stores  Ask your healthcare provider about the best exercise plan for you  Healthy meal plan for weight management:  A healthy meal plan includes a variety of foods, contains fewer calories, and helps you stay healthy  A healthy meal plan includes the following:     · Eat whole-grain foods more often  A healthy meal plan should contain fiber  Fiber is the part of grains, fruits, and vegetables that is not broken down by your body   Whole-grain foods are healthy and provide extra fiber in your diet  Some examples of whole-grain foods are whole-wheat breads and pastas, oatmeal, brown rice, and bulgur  · Eat a variety of vegetables every day  Include dark, leafy greens such as spinach, kale, job greens, and mustard greens  Eat yellow and orange vegetables such as carrots, sweet potatoes, and winter squash  · Eat a variety of fruits every day  Choose fresh or canned fruit (canned in its own juice or light syrup) instead of juice  Fruit juice has very little or no fiber  · Eat low-fat dairy foods  Drink fat-free (skim) milk or 1% milk  Eat fat-free yogurt and low-fat cottage cheese  Try low-fat cheeses such as mozzarella and other reduced-fat cheeses  · Choose meat and other protein foods that are low in fat  Choose beans or other legumes such as split peas or lentils  Choose fish, skinless poultry (chicken or turkey), or lean cuts of red meat (beef or pork)  Before you cook meat or poultry, cut off any visible fat  · Use less fat and oil  Try baking foods instead of frying them  Add less fat, such as margarine, sour cream, regular salad dressing, and mayonnaise to foods  Eat fewer high-fat foods  Some examples of high-fat foods include french fries, doughnuts, ice cream, and cakes  · Eat fewer sweets  Limit foods and drinks that are high in sugar  This includes candy, cookies, regular soda, and sweetened drinks  Ways to decrease calories:   · Eat smaller portions  ? Use a small plate with smaller servings  ? Do not eat second helpings  ? When you eat at a restaurant, ask for a box and place half of your meal in the box before you eat  ? Share an entrée with someone else  · Replace high-calorie snacks with healthy, low-calorie snacks  ? Choose fresh fruit, vegetables, fat-free rice cakes, or air-popped popcorn instead of potato chips, nuts, or chocolate  ? Choose water or calorie-free drinks instead of soda or sweetened drinks      · Do not shop for groceries when you are hungry  You may be more likely to make unhealthy food choices  Take a grocery list of healthy foods and shop after you have eaten  · Eat regular meals  Do not skip meals  Skipping meals can lead to overeating later in the day  This can make it harder for you to lose weight  Eat a healthy snack in place of a meal if you do not have time to eat a regular meal  Talk with a dietitian to help you create a meal plan and schedule that is right for you  Other things to consider as you try to lose weight:   · Be aware of situations that may give you the urge to overeat, such as eating while watching television  Find ways to avoid these situations  For example, read a book, go for a walk, or do crafts  · Meet with a weight loss support group or friends who are also trying to lose weight  This may help you stay motivated to continue working on your weight loss goals  © Copyright [address] Information is for End User's use only and may not be sold, redistributed or otherwise used for commercial purposes  All illustrations and images included in CareNotes® are the copyrighted property of [employer] M [employer]  or [facility] Victor Manuel Staton   The above information is an  only  It is not intended as medical advice for individual conditions or treatments  Talk to your doctor, nurse or pharmacist before following any medical regimen to see if it is safe and effective for you  Obesity   AMBULATORY CARE:   Obesity  is when your body mass index (BMI) is greater than 30  Your healthcare provider will use your height and weight to measure your BMI  The risks of obesity include  many health problems, such as injuries or physical disability   You may need tests to check for the following:  · Diabetes    · High blood pressure or high cholesterol    · Heart disease    · Gallbladder or liver disease    · Cancer of the colon, breast, prostate, liver, or kidney    · Sleep apnea    · Arthritis or gout    Seek care immediately if:   · You have a severe headache, confusion, or difficulty speaking  · You have weakness on one side of your body  · You have chest pain, sweating, or shortness of breath  Contact your healthcare provider if:   · You have symptoms of gallbladder or liver disease, such as pain in your upper abdomen  · You have knee or hip pain and discomfort while walking  · You have symptoms of diabetes, such as intense hunger and thirst, and frequent urination  · You have symptoms of sleep apnea, such as snoring or daytime sleepiness  · You have questions or concerns about your condition or care  Treatment for obesity  focuses on helping you lose weight to improve your health  Even a small decrease in BMI can reduce the risk for many health problems  Your healthcare provider will help you set a weight-loss goal   · Lifestyle changes  are the first step in treating obesity  These include making healthy food choices and getting regular physical activity  Your healthcare provider may suggest a weight-loss program that involves coaching, education, and therapy  · Medicine  may help you lose weight when it is used with a healthy diet and physical activity  · Surgery  can help you lose weight if you are very obese and have other health problems  There are several types of weight-loss surgery  Ask your healthcare provider for more information  Be successful losing weight:   · Set small, realistic goals  An example of a small goal is to walk for 20 minutes 5 days a week  Anther goal is to lose 5% of your body weight  · Tell friends, family members, and coworkers about your goals  and ask for their support  Ask a friend to lose weight with you, or join a weight-loss support group  · Identify foods or triggers that may cause you to overeat , and find ways to avoid them  Remove tempting high-calorie foods from your home and workplace   Place a bowl of fresh fruit on your kitchen counter  If stress causes you to eat, then find other ways to cope with stress  · Keep a diary to track what you eat and drink  Also write down how many minutes of physical activity you do each day  Weigh yourself once a week and record it in your diary  Eating changes: You will need to eat 500 to 1,000 fewer calories each day than you currently eat to lose 1 to 2 pounds a week  The following changes will help you cut calories:  · Eat smaller portions  Use small plates, no larger than 9 inches in diameter  Fill your plate half full of fruits and vegetables  Measure your food using measuring cups until you know what a serving size looks like  · Eat 3 meals and 1 or 2 snacks each day  Plan your meals in advance  Olesya Perez and eat at home most of the time  Eat slowly  Do not skip meals  Skipping meals can lead to overeating later in the day  This can make it harder for you to lose weight  Talk with a dietitian to help you make a meal plan and schedule that is right for you  · Eat fruits and vegetables at every meal   They are low in calories and high in fiber, which makes you feel full  Do not add butter, margarine, or cream sauce to vegetables  Use herbs to season steamed vegetables  · Eat less fat and fewer fried foods  Eat more baked or grilled chicken and fish  These protein sources are lower in calories and fat than red meat  Limit fast food  Dress your salads with olive oil and vinegar instead of bottled dressing  · Limit the amount of sugar you eat  Do not drink sugary beverages  Limit alcohol  Activity changes:  Physical activity is good for your body in many ways  It helps you burn calories and build strong muscles  It decreases stress and depression, and improves your mood  It can also help you sleep better  Talk to your healthcare provider before you begin an exercise program   · Exercise for at least 30 minutes 5 days a week  Start slowly   Set aside time each day for physical activity that you enjoy and that is convenient for you  It is best to do both weight training and an activity that increases your heart rate, such as walking, bicycling, or swimming  · Find ways to be more active  Do yard work and housecleaning  Walk up the stairs instead of using elevators  Spend your leisure time going to events that require walking, such as outdoor festivals or fairs  This extra physical activity can help you lose weight and keep it off  Follow up with your healthcare provider as directed: You may need to meet with a dietitian  Write down your questions so you remember to ask them during your visits  © Copyright [address] Information is for End User's use only and may not be sold, redistributed or otherwise used for commercial purposes  All illustrations and images included in CareNotes® are the copyrighted property of [employer]  or Mak Sadler  The above information is an  only  It is not intended as medical advice for individual conditions or treatments  Talk to your doctor, nurse or pharmacist before following any medical regimen to see if it is safe and effective for you

## 2021-03-15 NOTE — ASSESSMENT & PLAN NOTE
The patient had a Right Reverse Total Shoulder Arthroplasty   performed on March 3rd  Patient is wearing her sling as recommended  She has not yet started physical therapy  She is taking Tylenol and oxycodone as needed    She does have an upcoming appointment with the orthopedic provider tomorrow

## 2021-03-16 ENCOUNTER — TELEPHONE (OUTPATIENT)
Dept: INTERNAL MEDICINE CLINIC | Facility: CLINIC | Age: 61
End: 2021-03-16

## 2021-03-17 ENCOUNTER — APPOINTMENT (OUTPATIENT)
Dept: RADIOLOGY | Facility: CLINIC | Age: 61
End: 2021-03-17
Payer: COMMERCIAL

## 2021-03-17 ENCOUNTER — OFFICE VISIT (OUTPATIENT)
Dept: OBGYN CLINIC | Facility: CLINIC | Age: 61
End: 2021-03-17

## 2021-03-17 VITALS
SYSTOLIC BLOOD PRESSURE: 142 MMHG | HEIGHT: 61 IN | DIASTOLIC BLOOD PRESSURE: 81 MMHG | HEART RATE: 92 BPM | BODY MASS INDEX: 47.77 KG/M2 | RESPIRATION RATE: 20 BRPM | WEIGHT: 253 LBS

## 2021-03-17 DIAGNOSIS — Z96.611 STATUS POST REVERSE TOTAL REPLACEMENT OF RIGHT SHOULDER: ICD-10-CM

## 2021-03-17 DIAGNOSIS — Z96.611 STATUS POST REVERSE TOTAL REPLACEMENT OF RIGHT SHOULDER: Primary | ICD-10-CM

## 2021-03-17 PROCEDURE — 73030 X-RAY EXAM OF SHOULDER: CPT

## 2021-03-17 PROCEDURE — 3008F BODY MASS INDEX DOCD: CPT | Performed by: FAMILY MEDICINE

## 2021-03-17 PROCEDURE — 99024 POSTOP FOLLOW-UP VISIT: CPT | Performed by: ORTHOPAEDIC SURGERY

## 2021-03-17 NOTE — PROGRESS NOTES
Patient Name:  Charisse Snyder  MRN:  [id number]    [address] [place] [address]  Status post reverse total replacement of right shoulder  -     XR shoulder 2+ vw right; Future; Expected date: 03/17/2021      64year old female 2 weeks s/p Right Reverse shoulder arthroplasty  Patient doing well overall  Referral placed today for physical therapy and provided patient a protocol for RSA  Passive ROM at this time  Provided patient with exercise sheet with elbow flexion, scapular retractions, pendulums  Advised no active ROM at this time  Continue to utilize shoulder immobilizer throughout day and while sleeping, can remove for hygenic purposes and elbow ROM exercises  Continue OTC medications as needed for pain relief and ice application to Right shoulder  Patient will follow up in office in approximately 4 weeks for reevaluation of Right shoulder, new xrays upon arrival      History of the Present Illness   Charisse Snyder is a 64 y o  female 2 weeks s/p Right reverse shoulder arthroplasty  Patient reports in office with shoulder abduction pillow intact  Reports she is doing well overall, taking tylenol for pain management  Admits to some finger swelling, denies numbness or tingling  She is occasionally coming out of the sling for elbow ROM  She also admits she sometimes does not wear the sling to bed  Overall, patient doing very well and offers no complaints or concerns at this time  Denies new numbness or tingling to Right upper extremity  Review of Systems     Review of Systems   Constitutional: Negative for chills and fever  HENT: Negative for congestion  Respiratory: Negative for cough, chest tightness and shortness of breath  Cardiovascular: Negative for chest pain and palpitations  Gastrointestinal: Negative for abdominal pain  Endocrine: Negative for cold intolerance and heat intolerance  Musculoskeletal: Negative for back pain, gait problem and joint swelling     Neurological: Negative for syncope  Psychiatric/Behavioral: Negative for confusion  Physical Exam     /81   Pulse 92   Resp 20   Ht 5' 1" (1 549 m)   Wt 115 kg (253 lb)   BMI 47 80 kg/m²     Right shoulder:   Passive range of motion to 45 degrees forward flexion, 45 degrees abduction with mild discomfort with adduction  Surgical incision  Well healing without evidence of erythema, drainage  Monocryl suture ends intact  There is no erythema or drainage present  -2 degrees elbow extension and 120 elbow flexion, Full active range of motion of wrist, and digits present  The patient is neurovascular intact distally  Data Review     I have personally reviewed pertinent films in PACS, and my interpretation follows  Xrays taken today 03/17/2021 of Right shoulder demonstrate RSA prosthesis in good positioning without evidence of loosening or lucency  No acute fracture, dislocation  Maintenance of greater tuberosity position appreciated without signs of interval healing      Social History     Tobacco Use    Smoking status: Current Every Day Smoker     Packs/day: 0 50     Types: Cigarettes    Smokeless tobacco: Never Used   Substance Use Topics    Alcohol use: No    Drug use: No           Procedures   None     Jaleesa Lugo PA-C

## 2021-03-26 ENCOUNTER — TELEPHONE (OUTPATIENT)
Dept: HEMATOLOGY ONCOLOGY | Facility: CLINIC | Age: 61
End: 2021-03-26

## 2021-03-26 NOTE — TELEPHONE ENCOUNTER
Left message for patient letting her know her 3/29 apt that she wanted to reschedule was rescheduled for 4/28 at 1pm with Joi Wakefield at the [facility]  Left hopeline for questions or concerns

## 2021-03-26 NOTE — TELEPHONE ENCOUNTER
Appointment Confirmation     Appointment with  Don Jansen    Appointment date & time 4/28 1pm    Location [facility]    Patient verbilized Understanding Yes

## 2021-04-05 ENCOUNTER — VBI (OUTPATIENT)
Dept: ADMINISTRATIVE | Facility: OTHER | Age: 61
End: 2021-04-05

## 2021-04-14 ENCOUNTER — APPOINTMENT (OUTPATIENT)
Dept: RADIOLOGY | Facility: CLINIC | Age: 61
End: 2021-04-14
Payer: COMMERCIAL

## 2021-04-14 ENCOUNTER — OFFICE VISIT (OUTPATIENT)
Dept: OBGYN CLINIC | Facility: CLINIC | Age: 61
End: 2021-04-14

## 2021-04-14 VITALS
HEIGHT: 61 IN | BODY MASS INDEX: 47.2 KG/M2 | WEIGHT: 250 LBS | RESPIRATION RATE: 20 BRPM | SYSTOLIC BLOOD PRESSURE: 137 MMHG | HEART RATE: 94 BPM | DIASTOLIC BLOOD PRESSURE: 79 MMHG

## 2021-04-14 DIAGNOSIS — Z96.611 STATUS POST REVERSE TOTAL REPLACEMENT OF RIGHT SHOULDER: Primary | ICD-10-CM

## 2021-04-14 DIAGNOSIS — Z96.611 STATUS POST REVERSE TOTAL REPLACEMENT OF RIGHT SHOULDER: ICD-10-CM

## 2021-04-14 PROCEDURE — 99024 POSTOP FOLLOW-UP VISIT: CPT | Performed by: ORTHOPAEDIC SURGERY

## 2021-04-14 PROCEDURE — 73030 X-RAY EXAM OF SHOULDER: CPT

## 2021-04-14 NOTE — PROGRESS NOTES
Patient Name:  Guillermo Rowell  MRN:  [id number]    [address] [place] [address]  Status post reverse total replacement of right shoulder  -     XR shoulder 2+ vw right; Future; Expected date: 04/14/2021          Patient doing well status post right reverse shoulder arthroplasty  She has not started formal physical therapy  An order was placed at her last visit  She was instructed once again to begin formal physical therapy and will be calling today to make an appointment  Sling was discontinued today  She was once again instructed on home exercises  She was also once again given her postoperative rehab protocol she  I will see her back in 2 weeks for repeat evaluation  History of the Present Illness   Guillermo Rowell is a 64 y o  female with   Status post right reverse shoulder arthroplasty performed on 03/04/2021  Pain is well controlled  Patient has been  Using her sling  She has not started physical therapy yet  She denies any numbness, tingling, or other new symptoms  Review of Systems     Review of Systems   Constitutional: Negative for appetite change and unexpected weight change  HENT: Negative for congestion and trouble swallowing  Eyes: Negative for visual disturbance  Respiratory: Negative for cough and shortness of breath  Cardiovascular: Negative for chest pain and palpitations  Gastrointestinal: Negative for nausea and vomiting  Endocrine: Negative for cold intolerance and heat intolerance  Musculoskeletal: Negative for gait problem and myalgias  Skin: Negative for rash  Neurological: Negative for numbness  Physical Exam     /79   Pulse 94   Resp 20   Ht 5' 1" (1 549 m)   Wt 113 kg (250 lb)   BMI 47 24 kg/m²      Passive range of motion to 90 degrees forward flexion, 70 degrees abduction with mild discomfort with adduction  Deltoid contraction is palpable    Patient able to perform active forward elevation from the supine position with assistance to 70°  Surgical incision    Incision is well healed without erythema or warmth  Full active range of motion of  elbow, wrist, and digits present  The patient is neurovascular intact  Including axillary nerve distribution          Data Review     I have personally reviewed pertinent films in PACS, and my interpretation follows  X-rays of the right shoulder reviewed today display maintenance of reverse shoulder arthroplasty alignment  No interval sign of healing of greater tuberosity      Social History     Tobacco Use    Smoking status: Current Every Day Smoker     Packs/day: 0 50     Types: Cigarettes    Smokeless tobacco: Never Used   Substance Use Topics    Alcohol use: No    Drug use: No           Procedures    Leisa Hernández DO

## 2021-04-15 ENCOUNTER — EVALUATION (OUTPATIENT)
Dept: PHYSICAL THERAPY | Facility: CLINIC | Age: 61
End: 2021-04-15
Payer: COMMERCIAL

## 2021-04-15 DIAGNOSIS — Z96.611 STATUS POST REVERSE TOTAL REPLACEMENT OF RIGHT SHOULDER: ICD-10-CM

## 2021-04-15 PROCEDURE — 97161 PT EVAL LOW COMPLEX 20 MIN: CPT | Performed by: PHYSICAL THERAPIST

## 2021-04-15 PROCEDURE — 97110 THERAPEUTIC EXERCISES: CPT | Performed by: PHYSICAL THERAPIST

## 2021-04-15 NOTE — PROGRESS NOTES
PT Evaluation     Today's date: 4/15/2021  Patient name: Colin Interiano  : 1960  MRN: [id number]  Referring provider: Gifty Robertson DO  Dx:   Encounter Diagnosis     ICD-10-CM    1  Status post reverse total replacement of right shoulder  Z96 [id number] Ambulatory referral to Physical Therapy                  Assessment  Assessment details: Patient is a 65 y/o female s/p R reverse TSA on 3/3/21  Patient presents with decreased functional mobility due to increased pain, decreased shoulder strength, decreased shoulder ROM associated with aforementioned surgical procedure  Patient will benefit from skilled physical therapy to address impairment and improve functional mobility  PT needed to allow for return to maximal function and improve quality of life  Impairments: abnormal or restricted ROM, activity intolerance, impaired physical strength, lacks appropriate home exercise program and pain with function  Understanding of Dx/Px/POC: good   Prognosis: good    Goals  STG within 4 weeks:   1  Patient to be independent in HEP  2  Reduce pain by 50% to improve quality of life  3  Improve PROM to listed limits on protocol  LTG within 12 weeks:   1  Patient to be independent in ADLs/IADLs without difficulty  2  Patient to achieve functional elevation of shoulder  3  Patient to be able to lift household items       Plan  Plan details: Per Protocol  Patient would benefit from: skilled physical therapy and PT eval  Planned modality interventions: cryotherapy, hydrotherapy and unattended electrical stimulation  Planned therapy interventions: therapeutic training, therapeutic exercise, therapeutic activities, stretching, strengthening, postural training, patient education, neuromuscular re-education, manual therapy, joint mobilization, IADL retraining, activity modification, ADL retraining, ADL training, body mechanics training, flexibility, functional ROM exercises, gait training, graded activity, graded exercise, graded motor and home exercise program  Frequency: 2x week  Duration in weeks: 12  Plan of Care beginning date: 4/15/2021  Plan of Care expiration date: 2021  Treatment plan discussed with: patient        Subjective Evaluation    History of Present Illness  Mechanism of injury: Patient is a 63 y/o female s/p R reverse TSA on 3/3/21  Patient had fallen last year and suffered a humeral fracture  She did not regain full AROM and was ultimately scheduled for reverse TSA  Patient was seen by ortho yesterday and had an xray  She was d/c-ed from [facility] yesterday by ortho  She is referred for evaluation and treatment, following protocol  Pain  Current pain ratin  At best pain ratin  At worst pain rating: 3  Location: posterior right shoulder   Quality: dull ache  Relieving factors: heat  Progression: improved    Social Support  Steps to enter house: yes  Stairs in house: yes   Lives in: multiple-level home  Lives with: spouse and adult children    Employment status: not working  Hand dominance: right  Exercise history: light shoulder exercises, per physician   Life stress: Low       Diagnostic Tests  Abnormal x-ray: X-rays of the right shoulder display maintenance of reverse shoulder arthroplasty alignment  No interval sign of healing of greater tuberosity    Treatments  Previous treatment: physical therapy  Patient Goals  Patient goal: "to be able to have more function"        Objective     Active Range of Motion   Left Shoulder   Flexion: 125 degrees   Extension: 48 degrees   Abduction: 105 degrees   External rotation 0°: 30 degrees   Internal rotation BTB: L5     Additional Active Range of Motion Details  AROM not tested on right secondary to surgery    Strength/Myotome Testing     Additional Strength Details  Left shoulder: able to hold against minimal to moderate resistance in all planes     Right shoulder: not tested on right secondary to surgery             Precautions: h/o L reverse TSA, R reverse TSA 3/3/21    Increased time spent on patient education with diagnosis, prognosis, goals of therapy, progression of therapy, and plan of care  All questions answered  Patient instructed to call clinic with questions or concerns  Patient education in restrictions  Written HEP provided to patient       Manuals 4/15                                                                Neuro Re-Ed             PSR x20            scap pinches x20                                                                             Ther Ex             PROM per protocol 10'            HEP instruction: gripping, finger to thumb, elbow flex/ext, wrist flex/ext KS            Pendulums  x30 ea                                                                             Ther Activity                                       Gait Training                                       Modalities

## 2021-04-19 ENCOUNTER — OFFICE VISIT (OUTPATIENT)
Dept: PHYSICAL THERAPY | Facility: CLINIC | Age: 61
End: 2021-04-19
Payer: COMMERCIAL

## 2021-04-19 DIAGNOSIS — Z96.611 STATUS POST REVERSE TOTAL REPLACEMENT OF RIGHT SHOULDER: Primary | ICD-10-CM

## 2021-04-19 PROCEDURE — 97110 THERAPEUTIC EXERCISES: CPT

## 2021-04-19 NOTE — PROGRESS NOTES
Daily Note     Today's date: 2021  Patient name: Ute Nava  : 1960  MRN: [id number]   Referring provider: Francesca Carlisle DO  Dx: No diagnosis found  Start Time:           Subjective: Pt reports feeling sore  Pt with no c/o pain today  Objective: See treatment diary below      Assessment: Tolerated treatment well  Patient PROM per protocol  Pt does well with there ex today per protocol  Pt to continue with therapy for strengthening as able per protocol  Plan: Continue per plan of care  Precautions: h/o L reverse TSA, R reverse TSA 3/3/21    Increased time spent on patient education with diagnosis, prognosis, goals of therapy, progression of therapy, and plan of care  All questions answered  Patient instructed to call clinic with questions or concerns  Patient education in restrictions  Written HEP provided to patient       Manuals 4/15 4/19                                                               Neuro Re-Ed             PSR x20 20x           scap pinches x20 20x                                                                            Ther Ex             PROM per protocol 10' 10'           HEP instruction: gripping, finger to thumb, elbow flex/ext, wrist flex/ext KS Yellow 20x ea  1# elbow ext/flex  1# wrist flex/ext           Pendulums  x30 ea 30x                                                                            Ther Activity                                       Gait Training                                       Modalities

## 2021-04-22 ENCOUNTER — OFFICE VISIT (OUTPATIENT)
Dept: PHYSICAL THERAPY | Facility: CLINIC | Age: 61
End: 2021-04-22
Payer: COMMERCIAL

## 2021-04-22 DIAGNOSIS — Z96.611 STATUS POST REVERSE TOTAL REPLACEMENT OF RIGHT SHOULDER: Primary | ICD-10-CM

## 2021-04-22 PROCEDURE — 97112 NEUROMUSCULAR REEDUCATION: CPT

## 2021-04-22 PROCEDURE — 97110 THERAPEUTIC EXERCISES: CPT

## 2021-04-22 NOTE — PROGRESS NOTES
Daily Note     Today's date: 2021  Patient name: Damian Bradley  : 1960  MRN: [id number]  Referring provider: Anders Quinonez DO  Dx:   Encounter Diagnosis     ICD-10-CM    1  Status post reverse total replacement of right shoulder  [id number] [id number]                   Subjective: Patient reports R shoulder is just sore but feels she is progressing well overall  Objective: See treatment diary below      Assessment: Able to achieve all ROM goals within surgical protocol at this time  PROM 90* ABD, 120* shoulder flexion  Requires tactile cuing in order to facilitate scap depression  Unable to properly set R scapula without assistance  Pt is able to complete all charted exercises without c/o pain or discomfort  Plan: Continue per plan of care        Precautions: h/o L reverse TSA, R reverse TSA 3/3/21        Manuals 4/15 4/19 4/22                                                              Neuro Re-Ed             PSR x20 20x x20          scap pinches x20 20x 3"x20          scap depression   x20          submax delt isometrics   3"x10                                                 Ther Ex             PROM per protocol 10' 10' 15'          HEP instruction: gripping, finger to thumb, elbow flex/ext, wrist flex/ext KS Yellow 20x ea  1# elbow ext/flex  1# wrist flex/ext yellow x20 ea/ x20 elbow AROM          Pendulums  x30 ea 30x x30          pulleys   3'                                                              Ther Activity                                       Gait Training                                       Modalities             CP R GH   10'

## 2021-04-23 ENCOUNTER — VBI (OUTPATIENT)
Dept: ADMINISTRATIVE | Facility: OTHER | Age: 61
End: 2021-04-23

## 2021-04-23 NOTE — PROGRESS NOTES
[facility] - Hematology & Medical Oncology  Outpatient Visit Encounter Note      Derik Epps 64 y o  female 1960 [id number] Date:  4/28/2021    HEMATOLOGICAL HISTORY        Clotting History Denies   Bleeding History Denies   Cancer History Denies   Family Cancer History Father with lymphoma and sister with ovarian cancer   H/O Blood/Plt Transfusion Denies   Tobacco Use 15 pack year history, continues today   Alcohol Use Denies   Occupation Full time mother of 4 and 4 grandchildren     1  Iron deficiency anemia secondary to inadequate dietary iron intake (Dx: when she was in high school) - Rx: Ferrous gluconate 240 mg tablet    SUBJECTIVE      Derik Epps is a 64 y o  with morbid obesity here for new consultation with me today  The patient is referred by PCP MICHELLE Herr and the reason for consultation is iron deficiency anemia      Her CBC showed microcytic anemia with thrombocytosis:   9/12/2018  2/15/2021  3/4/2021  3/15/2021    WBC 9 09 8 98 13 86 (H) 8 74   Red Blood Cell Count 5 34 (H) 5 33 (H) 4 21 4 30   Hemoglobin 10 5 (L) 9 8 (L) 7 9 (L) 8 0 (L)   HCT 38 8 37 0 29 4 (L) 30 6 (L)   MCV 73 (L) 69 (L) 70 (L) 71 (L)   MCH 19 7 (L) 18 4 (L) 18 8 (L) 18 6 (L)   MCHC 27 1 (L) 26 5 (L) 26 9 (L) 26 1 (L)   RDW 20 9 (H) 21 2 (H) 20 7 (H) 21 5 (H)   Platelet Count 837 (H) 467 (H) 405 (H) 569 (H)     Her differential showed increased atypical lymphocytes %:   3/15/2021    Segs Relative 66   Abs Neutrophils 5 77   Lymphocytes % 17   Monocytes 8   Eosinophils 3   Basophils Relative 0   Atypical Lymphocytes % 6 (H)   Absolute Eosinophils 0 26   Basophils Absolute 0 00   RBC Morphology Present   Polychromasia Present   Target Cells Present   Anisocytosis Present   Hypochromia Present   Microcytes Present   Poikilocytes Present   Giant PLTs Present       Her iron panel demonstrated iron deficiency:   2/15/2021  3/15/2021    Iron 20 (L) 18 (L)   Ferritin 20 37 Iron Saturation 5 5   TIBC 370 366     She has a history of R humeral head fracture in August 2020 after a fall  This Visit  She is here with her daughter Yossi Arana today  She voices no acute complaints  She tells me that she eats a balanced diet of vegetables, meats, and fruits, though she is attempting to incorporate more fruit in her diet  She denies coffee ground stools, tarry stools, bright red blood per rectum, hematuria, or menorrhagia  She denies ever being told that she or a family member is diagnosed with thalassemia  She denies history of gastric bypass  She has never had a colonoscopy  She says that she is worried about not understanding what might be found on her GI work-up, so she has not made an appointment yet  She denies pagophagia, restless leg syndrome, brittle nails, thinning hair, or pallor  She denies fatigue, lightheadedness, dizziness, shortness of breath, EDWARDS, palpitations, or chest pain  She denies fevers, chills, unintentional weight loss, abdominal pain/distension, nausea, vomiting, constipation, diarrhea, petechiae/purpura, unexplained ecchymosis, or LE swelling  I have reviewed the relevant past medical, surgical, social and family history  I have also reviewed allergies and medications for this patient  Review of Systems  Review of Systems   All other systems reviewed and are negative  OBJECTIVE     Physical Exam  Vitals:    04/28/21 1303   BP: 140/62   BP Location: Left arm   Patient Position: Sitting   Cuff Size: Large   Pulse: 100   Resp: 20   Temp: 97 9 °F (36 6 °C)   TempSrc: Tympanic   SpO2: 97%   Weight: 117 kg (257 lb)   Height: 5' 1" (1 549 m)       Physical Exam  Vitals signs reviewed  Constitutional:       General: She is not in acute distress  Appearance: Normal appearance  She is obese  She is not ill-appearing, toxic-appearing or diaphoretic  Comments: Pleasant 65-year-old female sitting comfortably in a chair     HENT:      Head: Normocephalic and atraumatic  Eyes:      General: No scleral icterus  Extraocular Movements: Extraocular movements intact  Conjunctiva/sclera: Conjunctivae normal       Pupils: Pupils are equal, round, and reactive to light  Comments: No conjunctival pallor  Neck:      Musculoskeletal: Normal range of motion and neck supple  No neck rigidity  Cardiovascular:      Rate and Rhythm: Normal rate and regular rhythm  Pulses: Normal pulses  Heart sounds: Normal heart sounds  No murmur  No friction rub  No gallop  Pulmonary:      Effort: Pulmonary effort is normal  No respiratory distress  Breath sounds: Normal breath sounds  No wheezing or rales  Abdominal:      General: Bowel sounds are normal  There is no distension  Palpations: Abdomen is soft  There is no mass  Tenderness: There is no abdominal tenderness  There is no guarding or rebound  Musculoskeletal: Normal range of motion  General: No swelling or tenderness  Right lower leg: No edema  Left lower leg: No edema  Lymphadenopathy:      Cervical: No cervical adenopathy  Skin:     General: Skin is warm and dry  Coloration: Skin is not jaundiced or pale  Findings: No bruising, erythema or rash  Neurological:      General: No focal deficit present  Mental Status: She is alert and oriented to person, place, and time  Mental status is at baseline  Psychiatric:         Mood and Affect: Mood normal          Behavior: Behavior normal           Imaging  Relevant imaging reviewed in chart    Labs  Relevant labs reviewed in chart     ASSESSMENT & PLAN      Diagnosis ICD-10-CM Associated Orders   1  Iron deficiency anemia, unspecified iron deficiency anemia type  D50 9 CBC and Platelet     Iron Panel (Includes Ferritin, Iron Sat%, Iron, and TIBC)   2   Iron deficiency anemia secondary to inadequate dietary iron intake  D50 8        I discussed Heidi Pate's case with Dr Greer Hood and we formulated the plan together  Given Isabell Lainezs iron deficiency anemia, I recommend IV iron supplementation  I ordered IV iron sucrose (Venofer) 300mg weekly x 5  I reviewed the reasoning, process, and side effect profile of treatment with Venofer, which includes but is not limited to nausea, headache, hypotension, tattooing of the skin, and an anaphylactic reaction  I have ordered a CBC and platelet and iron panel in 3 months to confirm adequate iron supplementation and resolution of anemia  she will see me in the office in 3 months to review blood work and discuss plan moving forward  I answered all her questions and she voiced understanding  The underlying etiology of Isabell Torres iron deficiency anemia is still unknown  However, I defer to her PCP to investigate the underlying cause and coordinate the appropriate management if occult GI bleed is suspected  I explained that IV iron supplementation will only temporarily alleviate her iron deficiency anemia unless the underlying etiology is managed  Thus, I strongly encouraged her to follow up with her PCP and make an appointment with gastroenterology  Follow Up   3 months      All questions were answered to the patient's satisfaction during this encounter  They appreciated and thanked me for spending time with them  The patient knows the contact information for our office and know to reach out for any relevant concerns related to this encounter  For all other listed problems and medical diagnosis in his chart - they are managed by PCP and/or other specialists, which patient acknowledges        Laina Clarke PA-C  Hematology & Medical Oncology

## 2021-04-25 ENCOUNTER — RA CDI HCC (OUTPATIENT)
Dept: OTHER | Facility: HOSPITAL | Age: 61
End: 2021-04-25

## 2021-04-25 NOTE — PROGRESS NOTES
Lorena RUST 77  coding opportunities          Chart reviewed, no opportunity found: CHART REVIEWED, NO OPPORTUNITY FOUND              Patients insurance company: [facility] [address]  (Medicare Advantage and Commercial)

## 2021-04-28 ENCOUNTER — CONSULT (OUTPATIENT)
Dept: HEMATOLOGY ONCOLOGY | Facility: CLINIC | Age: 61
End: 2021-04-28
Payer: COMMERCIAL

## 2021-04-28 ENCOUNTER — OFFICE VISIT (OUTPATIENT)
Dept: PHYSICAL THERAPY | Facility: CLINIC | Age: 61
End: 2021-04-28
Payer: COMMERCIAL

## 2021-04-28 VITALS
HEIGHT: 61 IN | HEART RATE: 100 BPM | RESPIRATION RATE: 20 BRPM | DIASTOLIC BLOOD PRESSURE: 62 MMHG | BODY MASS INDEX: 48.52 KG/M2 | SYSTOLIC BLOOD PRESSURE: 140 MMHG | OXYGEN SATURATION: 97 % | TEMPERATURE: 97.9 F | WEIGHT: 257 LBS

## 2021-04-28 DIAGNOSIS — Z96.611 STATUS POST REVERSE TOTAL REPLACEMENT OF RIGHT SHOULDER: Primary | ICD-10-CM

## 2021-04-28 DIAGNOSIS — D50.8 IRON DEFICIENCY ANEMIA SECONDARY TO INADEQUATE DIETARY IRON INTAKE: ICD-10-CM

## 2021-04-28 DIAGNOSIS — D50.9 IRON DEFICIENCY ANEMIA, UNSPECIFIED IRON DEFICIENCY ANEMIA TYPE: Primary | ICD-10-CM

## 2021-04-28 PROCEDURE — 99204 OFFICE O/P NEW MOD 45 MIN: CPT | Performed by: STUDENT IN AN ORGANIZED HEALTH CARE EDUCATION/TRAINING PROGRAM

## 2021-04-28 PROCEDURE — 97112 NEUROMUSCULAR REEDUCATION: CPT | Performed by: PHYSICAL THERAPIST

## 2021-04-28 PROCEDURE — 4004F PT TOBACCO SCREEN RCVD TLK: CPT | Performed by: STUDENT IN AN ORGANIZED HEALTH CARE EDUCATION/TRAINING PROGRAM

## 2021-04-28 PROCEDURE — 97110 THERAPEUTIC EXERCISES: CPT | Performed by: PHYSICAL THERAPIST

## 2021-04-28 RX ORDER — SODIUM CHLORIDE 9 MG/ML
20 INJECTION, SOLUTION INTRAVENOUS ONCE
Status: CANCELLED | OUTPATIENT
Start: 2021-04-30

## 2021-04-28 NOTE — PROGRESS NOTES
Daily Note     Today's date: 2021  Patient name: Paul Mcintyre  : 1960  MRN: [id number]  Referring provider: Yasir Giraldo DO  Dx:   Encounter Diagnosis     ICD-10-CM    1  Status post reverse total replacement of right shoulder  Z96 611                   Subjective: Patient states she was a little sore the day after last visit, but felt fine during and the day of visit  Objective: See treatment diary below  PROM flexion: 129 degrees               ER: 39 degrees               Abd: approx  100 degrees     Assessment: Patient does well with session  No complaints of pain during or post session  She declines ice following session  Per protocol, gradual progression of PROM  Updated measurements as listed above  Plan: Continue per plan of care        Precautions: h/o L reverse TSA, R reverse TSA 3/3/21        Manuals 4/15 4/19 4/22 4/28                                                             Neuro Re-Ed             PSR x20 20x x20          scap pinches x20 20x 3"x20 3"x20          scap depression   x20 x20          submax delt isometrics   3"x10 abd 3"x15                                                Ther Ex             PROM per protocol 10' 10' 15' 15'         HEP instruction: gripping, finger to thumb, elbow flex/ext, wrist flex/ext KS Yellow 20x ea  1# elbow ext/flex  1# wrist flex/ext yellow x20 ea/ x20 elbow AROM Digiflex  yel x 20; elbow flex/ext x 20; wrist flex/ext 1# x20          Pendulums  x30 ea 30x x30 x30         pulleys   3' 3'                                                              Ther Activity                                       Gait Training                                       Modalities             CP R GH   10' decline

## 2021-04-28 NOTE — PATIENT INSTRUCTIONS
Iron Sucrose (By injection)   Iron Sucrose (EYE-urn MIRTHA-krose)  Treats anemia (lack of iron)  Brand Name(s): [employer] Rx Venofer, Venofer   There may be other brand names for this medicine  When This Medicine Should Not Be Used: This medicine is not right for everyone  You should not receive it if you had an allergic reaction to iron sucrose  How to Use This Medicine:   Injectable  · Your doctor will prescribe your dose and schedule  This medicine is given through a needle placed in a vein  · A nurse or other health provider will give you this medicine  Drugs and Foods to Avoid:      Ask your doctor or pharmacist before using any other medicine, including over-the-counter medicines, vitamins, and herbal products  Warnings While Using This Medicine:   · Tell your doctor if you are pregnant or breastfeeding, or if you have low blood pressure  · This medicine could lower your blood pressure too much and cause you to feel dizzy or lightheaded  Stand or sit up slowly if you are dizzy  · This medicine could raise your iron levels too high  Your doctor will do lab tests at regular visits to check on the effects of this medicine  Keep all appointments  Possible Side Effects While Using This Medicine:   Call your doctor right away if you notice any of these side effects:  · Allergic reaction: Itching or hives, swelling in your face or hands, swelling or tingling in your mouth or throat, chest tightness, trouble breathing  · Chest pain  · Lightheadedness, dizziness, or fainting  If you notice these less serious side effects, talk with your doctor:   · Diarrhea, nausea, vomiting  · Headache  · Muscle cramps  · Pain in your back, arms, or legs  · Pain, itching, burning, swelling, or a lump under your skin where the needle is placed  If you notice other side effects that you think are caused by this medicine, tell your doctor  Call your doctor for medical advice about side effects   You may report side effects to FDA at 3-054-FDA-0755  © Copyright "[employer] 2021 Information is for End User's use only and may not be sold, redistributed or otherwise used for commercial purposes  The above information is an  only  It is not intended as medical advice for individual conditions or treatments  Talk to your doctor, nurse or pharmacist before following any medical regimen to see if it is safe and effective for you

## 2021-04-29 ENCOUNTER — OFFICE VISIT (OUTPATIENT)
Dept: OBGYN CLINIC | Facility: CLINIC | Age: 61
End: 2021-04-29

## 2021-04-29 VITALS
HEIGHT: 61 IN | BODY MASS INDEX: 48.33 KG/M2 | RESPIRATION RATE: 20 BRPM | DIASTOLIC BLOOD PRESSURE: 86 MMHG | WEIGHT: 256 LBS | SYSTOLIC BLOOD PRESSURE: 149 MMHG | HEART RATE: 93 BPM

## 2021-04-29 DIAGNOSIS — S42.294D OTHER CLOSED NONDISPLACED FRACTURE OF PROXIMAL END OF RIGHT HUMERUS WITH ROUTINE HEALING, SUBSEQUENT ENCOUNTER: Primary | ICD-10-CM

## 2021-04-29 PROCEDURE — 3008F BODY MASS INDEX DOCD: CPT | Performed by: STUDENT IN AN ORGANIZED HEALTH CARE EDUCATION/TRAINING PROGRAM

## 2021-04-29 PROCEDURE — 99024 POSTOP FOLLOW-UP VISIT: CPT | Performed by: ORTHOPAEDIC SURGERY

## 2021-04-29 NOTE — PROGRESS NOTES
Patient Name:  Cosmo Otero  MRN:  [id number]    [address] [place] [address]     1  Other closed nondisplaced fracture of proximal end of right humerus with routine healing, subsequent encounter          The patient is status post right reverse shoulder arthroplasty performed on 03/03/2021  Pain and passive range of motion are improving  The patient was instructed to continue with physical therapy and home exercise program   She should be incorporating active range of motion exercises beginning with supine assisted forward flexion and progressing as strength and pain allows  I will see the patient back in 3 weeks for repeat evaluation and new x-rays of her right shoulder  History of the Present Illness   Cosmo Otero is a 64 y o  female  Status post right reverse shoulder arthroplasty performed on 03/03/2021  She reports that pain continues to improve  Range-of-motion is improving with physical therapy  She still reports difficulty with active elevation of her shoulder  No other new complaints at this time  Review of Systems     Review of Systems   Constitutional: Negative for appetite change and unexpected weight change  HENT: Negative for congestion and trouble swallowing  Eyes: Negative for visual disturbance  Respiratory: Negative for cough and shortness of breath  Cardiovascular: Negative for chest pain and palpitations  Gastrointestinal: Negative for nausea and vomiting  Endocrine: Negative for cold intolerance and heat intolerance  Musculoskeletal: Negative for gait problem and myalgias  Skin: Negative for rash  Neurological: Negative for numbness  Physical Exam     /86   Pulse 93   Resp 20   Ht 5' 1" (1 549 m)   Wt 116 kg (256 lb)   BMI 48 37 kg/m²       Right shoulder: Passive range of motion to 100 degrees forward flexion, 90 degrees abduction  Active abduction to 35  Degrees   Deltoid contraction is palpable   Surgical incision  is well healed without erythema or warmth  Full active range of motion of  elbow, wrist, and digits present   The patient is neurovascular intact  Including axillary nerve distribution  Data Review     I have personally reviewed pertinent films in PACS, and my interpretation follows  No new images reviewed today      Social History     Tobacco Use    Smoking status: Current Every Day Smoker     Packs/day: 0 50     Types: Cigarettes    Smokeless tobacco: Never Used   Substance Use Topics    Alcohol use: No    Drug use: No           Procedures    Leonel Cho DO

## 2021-04-30 ENCOUNTER — OFFICE VISIT (OUTPATIENT)
Dept: PHYSICAL THERAPY | Facility: CLINIC | Age: 61
End: 2021-04-30
Payer: COMMERCIAL

## 2021-04-30 DIAGNOSIS — D50.8 IRON DEFICIENCY ANEMIA SECONDARY TO INADEQUATE DIETARY IRON INTAKE: Primary | ICD-10-CM

## 2021-04-30 DIAGNOSIS — Z96.611 STATUS POST REVERSE TOTAL REPLACEMENT OF RIGHT SHOULDER: Primary | ICD-10-CM

## 2021-04-30 PROCEDURE — 97112 NEUROMUSCULAR REEDUCATION: CPT | Performed by: PHYSICAL THERAPIST

## 2021-04-30 PROCEDURE — 97110 THERAPEUTIC EXERCISES: CPT | Performed by: PHYSICAL THERAPIST

## 2021-04-30 RX ORDER — SODIUM CHLORIDE 9 MG/ML
20 INJECTION, SOLUTION INTRAVENOUS ONCE
Status: CANCELLED | OUTPATIENT
Start: 2021-05-04

## 2021-04-30 NOTE — PROGRESS NOTES
Daily Note     Today's date: 2021  Patient name: Bernardo Naranjo  : 1960  MRN: [id number]  Referring provider: Jasmina Julien DO  Dx:   Encounter Diagnosis     ICD-10-CM    1  Status post reverse total replacement of right shoulder  Z96 611                   Subjective: Patient reports being sore today  States she saw the physician yesterday and was encouraged not to overdo it at home  Objective: See treatment diary below (NOT UPDATED TODAY)  PROM flexion: 129 degrees               ER: 39 degrees               Abd: approx  100 degrees     Assessment: Patient does well with session  Encouraged to perform all HEP and therapy exercises through painfree range  Did communicate regarding protocol with surgeon; progression of AAROM this visit to include supine elevation with PT assist and light AAROM abduction in supine for deltoid strengthening, per protocol  Utilized ice post session, as recommended by PT  No complaints post session  Plan: Continue per plan of care        Precautions: h/o L reverse TSA, R reverse TSA 3/3/21        Manuals 4/15 4/19 4/22 4/28 4/30                                                            Neuro Re-Ed             PSR x20 20x x20          scap pinches x20 20x 3"x20 3"x20  3"x20        scap depression   x20 x20          submax delt isometrics   3"x10 abd 3"x15 Ext, flex, abd 3"x10 ea        AAROM- supine flexion w PT assist      2x10         AAROM- light supine abd w PT assist     x10         scap elevation/ shrugs      2x10                      Ther Ex             PROM per protocol 10' 10' 15' 15' 15'        HEP instruction: gripping, finger to thumb, elbow flex/ext, wrist flex/ext KS Yellow 20x ea  1# elbow ext/flex  1# wrist flex/ext yellow x20 ea/ x20 elbow AROM Digiflex  yel x 20; elbow flex/ext x 20; wrist flex/ext 1# x20  Elbow flex/ext 2x10         Pendulums  x30 ea 30x x30 x30 x30         pulleys   3' 3'  3' Ther Activity                                       Gait Training                                       Modalities             CP R GH   10' decline 10'

## 2021-05-03 ENCOUNTER — OFFICE VISIT (OUTPATIENT)
Dept: PHYSICAL THERAPY | Facility: CLINIC | Age: 61
End: 2021-05-03
Payer: COMMERCIAL

## 2021-05-03 DIAGNOSIS — Z96.611 STATUS POST REVERSE TOTAL REPLACEMENT OF RIGHT SHOULDER: Primary | ICD-10-CM

## 2021-05-03 PROCEDURE — 97112 NEUROMUSCULAR REEDUCATION: CPT | Performed by: PHYSICAL THERAPIST

## 2021-05-03 PROCEDURE — 97110 THERAPEUTIC EXERCISES: CPT | Performed by: PHYSICAL THERAPIST

## 2021-05-03 NOTE — PROGRESS NOTES
Daily Note     Today's date: 5/3/2021  Patient name: Shellie Arango  : 1960  MRN: [id number]  Referring provider: Ruy English DO  Dx:   Encounter Diagnosis     ICD-10-CM    1  Status post reverse total replacement of right shoulder  Z96 611                   Subjective: Patient continues to report low level soreness  No other new reports  Objective: See treatment diary below     Assessment: Patient does well with session  Initiated scapular PNF patterns in AAROM and AROM  Patient challenged with recruitment of scapular musculature, but was able to engage with verbal and manual cueing  No increased pain during or post session  Plan: Continue per plan of care        Precautions: h/o L reverse TSA, R reverse TSA 3/3/21        Manuals 4/15 4/19 4/22 4/28 4/30 5/3                                                           Neuro Re-Ed             PSR x20 20x x20          scap pinches x20 20x 3"x20 3"x20  3"x20 3"x20       scap depression   x20 x20   3"x20        submax delt isometrics   3"x10 abd 3"x15 Ext, flex, abd 3"x10 ea Ext, flex, abd 3"x 15ea       Submax IR/ER isometrics      3"x15 ea       AAROM- supine flexion w PT assist      2x10  2x10       AAROM- light supine abd w PT assist     x10  x10        scap elevation/ shrugs      2x10  2x10        Scapular PNF AAROM/AROM      8 min       Ther Ex             PROM per protocol 10' 10' 15' 15' 15' 10'       HEP instruction: gripping, finger to thumb, elbow flex/ext, wrist flex/ext KS Yellow 20x ea  1# elbow ext/flex  1# wrist flex/ext yellow x20 ea/ x20 elbow AROM Digiflex  yel x 20; elbow flex/ext x 20; wrist flex/ext 1# x20  Elbow flex/ext 2x10         Pendulums  x30 ea 30x x30 x30 x30  x30        pulleys   3' 3'  3' 5'        Shoulder AAROM Chest press (w opp arm)       10+8                                              Ther Activity                                       Gait Training Modalities             CP R GH   10' decline 10' 10'

## 2021-05-04 ENCOUNTER — HOSPITAL ENCOUNTER (OUTPATIENT)
Dept: INFUSION CENTER | Facility: CLINIC | Age: 61
Discharge: HOME | End: 2021-05-04
Payer: COMMERCIAL

## 2021-05-04 VITALS
TEMPERATURE: 97.8 F | HEART RATE: 97 BPM | RESPIRATION RATE: 20 BRPM | DIASTOLIC BLOOD PRESSURE: 65 MMHG | SYSTOLIC BLOOD PRESSURE: 139 MMHG

## 2021-05-04 DIAGNOSIS — D50.8 IRON DEFICIENCY ANEMIA SECONDARY TO INADEQUATE DIETARY IRON INTAKE: Primary | ICD-10-CM

## 2021-05-04 PROCEDURE — 96365 THER/PROPH/DIAG IV INF INIT: CPT

## 2021-05-04 RX ORDER — SODIUM CHLORIDE 9 MG/ML
20 INJECTION, SOLUTION INTRAVENOUS ONCE
Status: CANCELLED | OUTPATIENT
Start: 2021-05-11

## 2021-05-04 RX ORDER — SODIUM CHLORIDE 9 MG/ML
20 INJECTION, SOLUTION INTRAVENOUS ONCE
Status: COMPLETED | OUTPATIENT
Start: 2021-05-04 | End: 2021-05-04

## 2021-05-04 RX ADMIN — SODIUM CHLORIDE 20 ML/HR: 0.9 INJECTION, SOLUTION INTRAVENOUS at 09:56

## 2021-05-04 RX ADMIN — IRON SUCROSE 300 MG: 20 INJECTION, SOLUTION INTRAVENOUS at 09:56

## 2021-05-04 NOTE — PROGRESS NOTES
Pt tolerated venofer infusion without incident  PIV removed  AVS provided  Pt discharged in stable condition

## 2021-05-06 ENCOUNTER — OFFICE VISIT (OUTPATIENT)
Dept: PHYSICAL THERAPY | Facility: CLINIC | Age: 61
End: 2021-05-06
Payer: COMMERCIAL

## 2021-05-06 DIAGNOSIS — Z96.611 STATUS POST REVERSE TOTAL REPLACEMENT OF RIGHT SHOULDER: Primary | ICD-10-CM

## 2021-05-06 PROCEDURE — 97112 NEUROMUSCULAR REEDUCATION: CPT | Performed by: PHYSICAL THERAPIST

## 2021-05-06 PROCEDURE — 97110 THERAPEUTIC EXERCISES: CPT | Performed by: PHYSICAL THERAPIST

## 2021-05-06 NOTE — PROGRESS NOTES
Daily Note     Today's date: 2021  Patient name: Mayuri Alicea  : 1960  MRN: [id number]  Referring provider: Cheri Mendiola DO  Dx:   Encounter Diagnosis     ICD-10-CM    1  Status post reverse total replacement of right shoulder  Z96 611                   Subjective: Patient continues to report soreness, but states she's feeling less sore overall  Reports feeling good after therapy sessions  Objective: See treatment diary below     Assessment: Patient does well with session  She remains very challenged with AAROM of right UE  No pain during or post session  Declines ice post session  Sessions are compliant with protocol  Plan: Continue per plan of care        Precautions: h/o L reverse TSA, R reverse TSA 3/3/21        Manuals 4/15 4/19 4/22 4/28 4/30 5/3 5/6                                                          Neuro Re-Ed             PSR x20 20x x20          scap pinches x20 20x 3"x20 3"x20  3"x20 3"x20       scap depression   x20 x20   3"x20        submax delt isometrics   3"x10 abd 3"x15 Ext, flex, abd 3"x10 ea Ext, flex, abd 3"x 15ea Ext, flex, abd 3"x 15ea      Submax IR/ER isometrics      3"x15 ea       AAROM- supine flexion w PT assist      2x10  2x10 2x10      AAROM- light supine abd w PT assist     x10  x10        scap elevation/ shrugs      2x10  2x10  2x10      Scapular PNF AAROM/AROM      8 min       Ther Ex             PROM per protocol 10' 10' 15' 15' 15' 10' 10'      HEP instruction: gripping, finger to thumb, elbow flex/ext, wrist flex/ext KS Yellow 20x ea  1# elbow ext/flex  1# wrist flex/ext yellow x20 ea/ x20 elbow AROM Digiflex  yel x 20; elbow flex/ext x 20; wrist flex/ext 1# x20  Elbow flex/ext 2x10         Pendulums  x30 ea 30x x30 x30 x30  x30        pulleys   3' 3'  3' 5'  5'      Shoulder AAROM Chest press (w opp arm)       10+8       AAROM Ball roll flexion       2x10      AAROM Table slide flexion       2x10                    Ther Activity Gait Training                                       Modalities             CP R GH   10' decline 10' 10' decline

## 2021-05-10 ENCOUNTER — OFFICE VISIT (OUTPATIENT)
Dept: PHYSICAL THERAPY | Facility: CLINIC | Age: 61
End: 2021-05-10
Payer: COMMERCIAL

## 2021-05-10 DIAGNOSIS — Z96.611 STATUS POST REVERSE TOTAL REPLACEMENT OF RIGHT SHOULDER: Primary | ICD-10-CM

## 2021-05-10 PROCEDURE — 97110 THERAPEUTIC EXERCISES: CPT | Performed by: PHYSICAL THERAPIST

## 2021-05-10 PROCEDURE — 97112 NEUROMUSCULAR REEDUCATION: CPT | Performed by: PHYSICAL THERAPIST

## 2021-05-10 NOTE — PROGRESS NOTES
Daily Note     Today's date: 5/10/2021  Patient name: Shady Lentz  : 1960  MRN: [id number]  Referring provider: Brad Blanco DO  Dx:   Encounter Diagnosis     ICD-10-CM    1  Status post reverse total replacement of right shoulder  Z96 611                   Subjective: Patient states she always feels better after her therapy sessions  Objective: See treatment diary below     Assessment: Patient does well with session  She was able to add supine cane flexion and supine cane chest press for AAROM  She will add these on as part of HEP, as she completes them without pain  She does fatigue following, but will continue to increase her reps as able through painfree range  Plan: Continue per plan of care        Precautions: h/o L reverse TSA, R reverse TSA 3/3/21        Manuals 4/15 4/19 4/22 4/28 4/30 5/3 5/6 5/10                                                         Neuro Re-Ed             PSR x20 20x x20          scap pinches x20 20x 3"x20 3"x20  3"x20 3"x20  3"x20      scap depression   x20 x20   3"x20        submax delt isometrics   3"x10 abd 3"x15 Ext, flex, abd 3"x10 ea Ext, flex, abd 3"x 15ea Ext, flex, abd 3"x 15ea Ext, flex, abd 3"x 15ea     Submax IR/ER isometrics      3"x15 ea  3"x15 ea     AAROM- supine flexion w PT assist      2x10  2x10 2x10 w cane x6; w PT x10     AAROM- light supine abd w PT assist     x10  x10        scap elevation/ shrugs      2x10  2x10  2x10      Scapular PNF AAROM/AROM      8 min  8 min     Ther Ex             PROM per protocol 10' 10' 15' 15' 15' 10' 10' 10'     HEP instruction: gripping, finger to thumb, elbow flex/ext, wrist flex/ext KS Yellow 20x ea  1# elbow ext/flex  1# wrist flex/ext yellow x20 ea/ x20 elbow AROM Digiflex  yel x 20; elbow flex/ext x 20; wrist flex/ext 1# x20  Elbow flex/ext 2x10         Pendulums  x30 ea 30x x30 x30 x30  x30        pulleys   3' 3'  3' 5'  5' 5'     Shoulder AAROM Chest press (w opp arm)       10+8  Tammie العراقي 2x10      AAROM Ball roll flexion       2x10 Flex/scap 2x10 ea     AAROM Table slide flexion       2x10       Finger ladder AAROM        x20      Ther Activity                                       Gait Training                                       Modalities             CP R GH   10' decline 10' 10' decline 10'

## 2021-05-11 ENCOUNTER — HOSPITAL ENCOUNTER (OUTPATIENT)
Dept: INFUSION CENTER | Facility: CLINIC | Age: 61
Discharge: HOME | End: 2021-05-11
Payer: COMMERCIAL

## 2021-05-11 VITALS
RESPIRATION RATE: 20 BRPM | TEMPERATURE: 97.8 F | DIASTOLIC BLOOD PRESSURE: 60 MMHG | HEART RATE: 97 BPM | SYSTOLIC BLOOD PRESSURE: 130 MMHG

## 2021-05-11 DIAGNOSIS — D50.8 IRON DEFICIENCY ANEMIA SECONDARY TO INADEQUATE DIETARY IRON INTAKE: Primary | ICD-10-CM

## 2021-05-11 PROCEDURE — 96365 THER/PROPH/DIAG IV INF INIT: CPT

## 2021-05-11 RX ORDER — SODIUM CHLORIDE 9 MG/ML
20 INJECTION, SOLUTION INTRAVENOUS ONCE
Status: COMPLETED | OUTPATIENT
Start: 2021-05-11 | End: 2021-05-11

## 2021-05-11 RX ORDER — SODIUM CHLORIDE 9 MG/ML
20 INJECTION, SOLUTION INTRAVENOUS ONCE
Status: CANCELLED | OUTPATIENT
Start: 2021-05-18

## 2021-05-11 RX ADMIN — SODIUM CHLORIDE 20 ML/HR: 0.9 INJECTION, SOLUTION INTRAVENOUS at 12:01

## 2021-05-11 RX ADMIN — IRON SUCROSE 300 MG: 20 INJECTION, SOLUTION INTRAVENOUS at 12:01

## 2021-05-11 NOTE — PROGRESS NOTES
Pt tolerated venofer infusion without incident  PIV removed  AVS declined  Pt is aware of her next appt

## 2021-05-13 ENCOUNTER — OFFICE VISIT (OUTPATIENT)
Dept: PHYSICAL THERAPY | Facility: CLINIC | Age: 61
End: 2021-05-13
Payer: COMMERCIAL

## 2021-05-13 DIAGNOSIS — Z96.611 STATUS POST REVERSE TOTAL REPLACEMENT OF RIGHT SHOULDER: Primary | ICD-10-CM

## 2021-05-13 PROCEDURE — 97110 THERAPEUTIC EXERCISES: CPT | Performed by: PHYSICAL THERAPIST

## 2021-05-13 PROCEDURE — 97112 NEUROMUSCULAR REEDUCATION: CPT | Performed by: PHYSICAL THERAPIST

## 2021-05-13 NOTE — PROGRESS NOTES
Daily Note     Today's date: 2021  Patient name: Shady Lentz  : 1960  MRN: [id number]  Referring provider: Brad Blanco DO  Dx:   Encounter Diagnosis     ICD-10-CM    1  Status post reverse total replacement of right shoulder  Z96 611                   Subjective: Patient reports being a little sore overall  States she might be overdoing it at home, but feels better following sessions  Objective: See treatment diary below     PROM:   Flexion: 125 degrees   Abduction: 105 degrees   ER: 60 degrees     Assessment: Patient does well with session  Encouraged to use pillow behind arm during sleeping  She is challenged with AAROM elevation, but able to perform with more reps this visit than last visit  Patient encouraged to discuss any issues and prognosis with her physician next week at follow up  Plan: Continue per plan of care        Precautions: h/o L reverse TSA, R reverse TSA 3/3/21        Manuals 4/15 4/19 4/22 4/28 4/30 5/3 5/6 5/10 5/13                                                        Neuro Re-Ed             PSR x20 20x x20      3"x20     scap pinches x20 20x 3"x20 3"x20  3"x20 3"x20  3"x20  3"x20     scap depression   x20 x20   3"x20        submax delt isometrics   3"x10 abd 3"x15 Ext, flex, abd 3"x10 ea Ext, flex, abd 3"x 15ea Ext, flex, abd 3"x 15ea Ext, flex, abd 3"x 15ea Ext, flex, abd 3"x 15ea    Submax IR/ER isometrics      3"x15 ea  3"x15 ea     AAROM- supine flexion w PT assist      2x10  2x10 2x10 w cane x6; w PT x10 w cane 2x10     AAROM- light supine abd w PT assist     x10  x10        scap elevation/ shrugs      2x10  2x10  2x10  2x10     Scapular PNF AAROM/AROM      8 min  8 min 8 min    Ther Ex             PROM per protocol 10' 10' 15' 15' 15' 10' 10' 10 10'    HEP instruction: gripping, finger to thumb, elbow flex/ext, wrist flex/ext KS Yellow 20x ea  1# elbow ext/flex  1# wrist flex/ext yellow x20 ea/ x20 elbow AROM Digiflex  yel x 20; elbow flex/ext x 20; wrist flex/ext 1# x20  Elbow flex/ext 2x10         Pendulums  x30 ea 30x x30 x30 x30  x30        pulleys   3' 3'  3' 5'  5' 5' 5'    Shoulder AAROM Chest press (w opp arm)       10+8  Cane 2x10  Cane 2x10     AAROM Ball roll flexion       2x10 Flex/scap 2x10 ea Flex 2x10     AAROM Table slide flexion       2x10       Finger ladder AAROM        x20  x20    Ther Activity                                       Gait Training                                       Modalities             CP R GH   10' decline 10' 10' decline 10' 10'

## 2021-05-17 ENCOUNTER — OFFICE VISIT (OUTPATIENT)
Dept: OBGYN CLINIC | Facility: CLINIC | Age: 61
End: 2021-05-17

## 2021-05-17 ENCOUNTER — APPOINTMENT (OUTPATIENT)
Dept: RADIOLOGY | Facility: CLINIC | Age: 61
End: 2021-05-17
Payer: COMMERCIAL

## 2021-05-17 ENCOUNTER — OFFICE VISIT (OUTPATIENT)
Dept: PHYSICAL THERAPY | Facility: CLINIC | Age: 61
End: 2021-05-17
Payer: COMMERCIAL

## 2021-05-17 VITALS
DIASTOLIC BLOOD PRESSURE: 83 MMHG | RESPIRATION RATE: 20 BRPM | BODY MASS INDEX: 47.77 KG/M2 | SYSTOLIC BLOOD PRESSURE: 146 MMHG | HEIGHT: 61 IN | WEIGHT: 253 LBS | HEART RATE: 98 BPM

## 2021-05-17 DIAGNOSIS — Z96.611 STATUS POST REVERSE TOTAL REPLACEMENT OF RIGHT SHOULDER: ICD-10-CM

## 2021-05-17 DIAGNOSIS — Z96.611 STATUS POST REVERSE TOTAL REPLACEMENT OF RIGHT SHOULDER: Primary | ICD-10-CM

## 2021-05-17 PROCEDURE — 73030 X-RAY EXAM OF SHOULDER: CPT

## 2021-05-17 PROCEDURE — 99024 POSTOP FOLLOW-UP VISIT: CPT | Performed by: ORTHOPAEDIC SURGERY

## 2021-05-17 PROCEDURE — 97112 NEUROMUSCULAR REEDUCATION: CPT | Performed by: PHYSICAL THERAPIST

## 2021-05-17 PROCEDURE — 97110 THERAPEUTIC EXERCISES: CPT | Performed by: PHYSICAL THERAPIST

## 2021-05-17 NOTE — PROGRESS NOTES
Daily Note     Today's date: 2021  Patient name: Paul Mcintyre  : 1960  MRN: [id number]  Referring provider: Yasir Giraldo DO  Dx:   Encounter Diagnosis     ICD-10-CM    1  Status post reverse total replacement of right shoulder  Z96 611                   Subjective: Patient states her soreness has decreased  States she's been doing her cane exercises at home, but still limited with AA & AROM  Objective: See treatment diary below     PROM:   Flexion: 125 degrees   Abduction: 105 degrees   ER: 60 degrees     Assessment: Patient does well with session  She exhibits improved ability to perform AAROM for elevation, but still limited  Improved performance of cane exercises and PT assisted elevation  Unable to elevate UE actively  Message sent to surgeon to alert him on current progress and symptoms  Plan: Continue per plan of care         Precautions: h/o L reverse TSA, R reverse TSA 3/3/21        Manuals 4/15 4/19 4/22 4/28 4/30 5/3 5/6 5/10 5/13 5/17                                                       Neuro Re-Ed             PSR x20 20x x20      3"x20  x20   scap pinches x20 20x 3"x20 3"x20  3"x20 3"x20  3"x20  3"x20     scap depression   x20 x20   3"x20        submax delt isometrics   3"x10 abd 3"x15 Ext, flex, abd 3"x10 ea Ext, flex, abd 3"x 15ea Ext, flex, abd 3"x 15ea Ext, flex, abd 3"x 15ea Ext, flex, abd 3"x 15ea Ext, flex, abd 3"x 15ea   Submax IR/ER isometrics      3"x15 ea  3"x15 ea  3"x15 ea    AAROM- supine flexion w PT assist      2x10  2x10 2x10 w cane x6; w PT x10 w cane 2x10  w cane 2x10   w PT 2x10   AAROM- light supine abd w PT assist     x10  x10        scap elevation/ shrugs      2x10  2x10  2x10  2x10     Scapular PNF AAROM/AROM      8 min  8 min 8 min    Ther Ex             PROM per protocol 10' 10' 15' 15' 15' 10' 10' 10' 10' 10'   HEP instruction: gripping, finger to thumb, elbow flex/ext, wrist flex/ext KS Yellow 20x ea  1# elbow ext/flex  1# wrist flex/ext yellow x20 ea/ x20 elbow AROM Digiflex  yel x 20; elbow flex/ext x 20; wrist flex/ext 1# x20  Elbow flex/ext 2x10         Pendulums  x30 ea 30x x30 x30 x30  x30        pulleys   3' 3'  3' 5'  5' 5' 5' 5'   Shoulder AAROM Chest press (w opp arm)       10+8  Cane 2x10  Cane 2x10  Cane 2x10    AAROM Ball roll flexion       2x10 Flex/scap 2x10 ea Flex 2x10  Flex 2x10    AAROM Table slide flexion       2x10       Finger ladder AAROM        x20  x20 x20    Standing cane flex- AAROM          x6   Standing flex w PT assist           2x10    Ther Activity                                       Gait Training                                       Modalities             CP R GH   10' decline 10' 10' decline 10' 10' 10'

## 2021-05-17 NOTE — PROGRESS NOTES
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  She should continue physical therapy working on strengthening, and range of motion and continue home exercise program  Provided the patient with shoulder shrugs and rows exercises in addition to current exercise program  I will see the patient back in 3 weeks for a repeat evaluation and x-rays  History of the Present Illness   Belén Nova is a 64 y o  female status post 10 weeks right reverse shoulder arthroplasty performed on 03/03/212  At the last visit on 4/29/21, pain and passive range of motion were improving gradually  Instructed to continue physical theapy and home exercise program  She was provided with active range of motion exercises working on supine assisted forward flexion and progressing strength  Today the patient states pain Is well controlled  Review of Systems     Review of Systems   Constitutional: Negative for appetite change and unexpected weight change  HENT: Negative for congestion and trouble swallowing  Eyes: Negative for visual disturbance  Respiratory: Negative for cough and shortness of breath  Cardiovascular: Negative for chest pain and palpitations  Gastrointestinal: Negative for nausea and vomiting  Endocrine: Negative for cold intolerance and heat intolerance     Musculoskeletal: Negative for gait problem and myalgias  Skin: Negative for rash  Neurological: Negative for numbness  Physical Exam     /83   Pulse 98   Resp 20   Ht 5' 1" (1 549 m)   Wt 115 kg (253 lb)   BMI 47 80 kg/m²     Right Shoulder: Incision is well-healed  There is no erythema or  warmth  Passive range of motion of shoulder 110 forward flexion passive, 90 abduction  Supine active forward flexion 90 degrees  Full active range of motion of elbow, wrist, and digits present  The patient is neurovascular intact distally  Data Review     I have personally reviewed pertinent films in PACS, and my interpretation follows  X-rays of the right shoulder were obtained today, which demonstrates stable reverse shoulder prothesis without evidence of hardware loosening or displacement      Social History     Tobacco Use    Smoking status: Current Every Day Smoker     Packs/day: 0 50     Types: Cigarettes    Smokeless tobacco: Never Used   Substance Use Topics    Alcohol use: No    Drug use: No           Procedures   None      Scribe Attestation    I,:  Leora Last MA am acting as a scribe while in the presence of the attending physician :       I,:  Pamela Mccallum DO personally performed the services described in this documentation    as scribed in my presence :

## 2021-05-18 ENCOUNTER — HOSPITAL ENCOUNTER (OUTPATIENT)
Dept: INFUSION CENTER | Facility: CLINIC | Age: 61
Discharge: HOME | End: 2021-05-18
Payer: COMMERCIAL

## 2021-05-18 VITALS
SYSTOLIC BLOOD PRESSURE: 138 MMHG | RESPIRATION RATE: 18 BRPM | DIASTOLIC BLOOD PRESSURE: 70 MMHG | TEMPERATURE: 98.4 F | HEART RATE: 83 BPM

## 2021-05-18 DIAGNOSIS — D50.8 IRON DEFICIENCY ANEMIA SECONDARY TO INADEQUATE DIETARY IRON INTAKE: Primary | ICD-10-CM

## 2021-05-18 PROCEDURE — 96365 THER/PROPH/DIAG IV INF INIT: CPT

## 2021-05-18 RX ORDER — SODIUM CHLORIDE 9 MG/ML
20 INJECTION, SOLUTION INTRAVENOUS ONCE
Status: CANCELLED | OUTPATIENT
Start: 2021-05-25

## 2021-05-18 RX ORDER — SODIUM CHLORIDE 9 MG/ML
20 INJECTION, SOLUTION INTRAVENOUS ONCE
Status: COMPLETED | OUTPATIENT
Start: 2021-05-18 | End: 2021-05-18

## 2021-05-18 RX ADMIN — IRON SUCROSE 300 MG: 20 INJECTION, SOLUTION INTRAVENOUS at 14:23

## 2021-05-18 RX ADMIN — SODIUM CHLORIDE 20 ML/HR: 0.9 INJECTION, SOLUTION INTRAVENOUS at 14:22

## 2021-05-18 NOTE — PROGRESS NOTES
Pt to clinic for venofer, offers no complaints today, initial PIV infiltrated prior to NS and venofer hook-up, new IV placed, pt tolerated infusion without complications, aware of next appointment, PIV removed, avs printed and reviewed

## 2021-05-18 NOTE — PLAN OF CARE
Problem: Potential for Falls  Goal: Patient will remain free of falls  Description: INTERVENTIONS:  - Assess patient frequently for physical needs  -  Identify cognitive and physical deficits and behaviors that affect risk of falls  -  [place] fall precautions as indicated by assessment   - Educate patient/family on patient safety including physical limitations  - Instruct patient to call for assistance with activity based on assessment  - Modify environment to reduce risk of injury  - Consider OT/PT consult to assist with strengthening/mobility  Outcome: Progressing     Problem: SAFETY ADULT  Goal: Patient will remain free of falls  Description: INTERVENTIONS:  - Assess patient frequently for physical needs  -  Identify cognitive and physical deficits and behaviors that affect risk of falls  -  [place] fall precautions as indicated by assessment   - Educate patient/family on patient safety including physical limitations  - Instruct patient to call for assistance with activity based on assessment  - Modify environment to reduce risk of injury  - Consider OT/PT consult to assist with strengthening/mobility  Outcome: Progressing     Problem: Knowledge Deficit  Goal: Patient/family/caregiver demonstrates understanding of disease process, treatment plan, medications, and discharge instructions  Description: Complete learning assessment and assess knowledge base    Interventions:  - Provide teaching at level of understanding  - Provide teaching via preferred learning methods  Outcome: Progressing

## 2021-05-20 ENCOUNTER — OFFICE VISIT (OUTPATIENT)
Dept: PHYSICAL THERAPY | Facility: CLINIC | Age: 61
End: 2021-05-20
Payer: COMMERCIAL

## 2021-05-20 DIAGNOSIS — Z96.611 STATUS POST REVERSE TOTAL REPLACEMENT OF RIGHT SHOULDER: Primary | ICD-10-CM

## 2021-05-20 PROCEDURE — 97110 THERAPEUTIC EXERCISES: CPT | Performed by: PHYSICAL THERAPIST

## 2021-05-20 PROCEDURE — 97112 NEUROMUSCULAR REEDUCATION: CPT | Performed by: PHYSICAL THERAPIST

## 2021-05-20 NOTE — PROGRESS NOTES
PT Re-Evaluation     Today's date: 2021  Patient name: James Baer  : 1960  MRN: [id number]  Referring provider: Madeleine Wade DO  Dx:   Encounter Diagnosis     ICD-10-CM    1  Status post reverse total replacement of right shoulder  Z96 611                   Assessment  Assessment details: Patient is a 65 y/o female s/p R reverse TSA on 3/3/21  Since starting therapy, she presents with improved shoulder passive ROM and mildly improved active assisted ROM  Her exercise tolerance has improved  She continues with decreased functional mobility due to increased pain, decreased shoulder strength, decreased shoulder ROM associated with aforementioned surgical procedure  Patient will continue to benefit from skilled physical therapy to address impairment and improve functional mobility  PT needed to allow for return to maximal function and improve quality of life  I did reach out to physician regarding slow progress with therapy; he is aware and corresponded back to me regarding slow progress  Impairments: abnormal or restricted ROM, activity intolerance, impaired physical strength, lacks appropriate home exercise program and pain with function  Understanding of Dx/Px/POC: good   Prognosis: good    Goals  STG within 4 weeks:   1  Patient to be independent in HEP  PARTIALLY MET  2  Reduce pain by 50% to improve quality of life  NOT MET  3  Improve PROM to listed limits on protocol  MET  LTG within 12 weeks:   1  Patient to be independent in ADLs/IADLs without difficulty  2  Patient to achieve functional elevation of shoulder  3  Patient to be able to lift household items       Plan  Plan details: Per Protocol  Patient would benefit from: skilled physical therapy and PT eval  Planned modality interventions: cryotherapy, hydrotherapy and unattended electrical stimulation  Planned therapy interventions: therapeutic training, therapeutic exercise, therapeutic activities, stretching, strengthening, postural training, patient education, neuromuscular re-education, manual therapy, joint mobilization, IADL retraining, activity modification, ADL retraining, ADL training, body mechanics training, flexibility, functional ROM exercises, gait training, graded activity, graded exercise, graded motor and home exercise program  Frequency: 2x week  Duration in weeks: 12  Plan of Care beginning date: 4/15/2021  Plan of Care expiration date: 2021  Treatment plan discussed with: patient        Subjective Evaluation    History of Present Illness  Mechanism of injury: Patient is a 63 y/o female s/p R reverse TSA on 3/3/21  Patient had fallen last year and suffered a humeral fracture  She did not regain full AROM and was ultimately scheduled for reverse TSA  Since starting therapy, she reports less shoulder pain and improved ROM  She reports improved use of RUE at waist level  She was seen by surgeon this week and encouraged to continue with therapy  Pain  Current pain ratin  At best pain ratin  At worst pain rating: 3  Location: posterior right shoulder   Quality: dull ache  Relieving factors: heat  Progression: improved    Social Support  Steps to enter house: yes  Stairs in house: yes   Lives in: multiple-level home  Lives with: spouse and adult children    Employment status: not working  Hand dominance: right  Exercise history: light shoulder exercises, per physician   Life stress: Low       Diagnostic Tests  Abnormal x-ray: X-rays of the right shoulder display maintenance of reverse shoulder arthroplasty alignment  No interval sign of healing of greater tuberosity    Treatments  Previous treatment: physical therapy  Patient Goals  Patient goal: "to be able to have more function"        Objective     Active Range of Motion   Left Shoulder   Flexion: 125 degrees   Extension: 48 degrees   Abduction: 105 degrees   External rotation 0°: 30 degrees   Internal rotation BTB: L5     Right Shoulder Flexion: 10 degrees   Abduction: 10 degrees   External rotation 0°: 20 degrees     Additional Active Range of Motion Details  AROM not tested on right secondary to surgery    Passive Range of Motion     Right Shoulder   Flexion: 125 degrees   Abduction: 115 degrees   External rotation 45°: 60 degrees     Strength/Myotome Testing     Additional Strength Details  Left shoulder: able to hold against minimal to moderate resistance in all planes     Right shoulder: not tested on right secondary to surgery             Precautions: h/o L reverse TSA, R reverse TSA 3/3/21        Manuals 5/20    4/30 5/3 5/6 5/10 5/13 5/17   Re-eval KS                                                   Neuro Re-Ed             PSR x20        3"x20  x20   scap pinches 2x10    3"x20 3"x20  3"x20  3"x20     scap depression      3"x20        submax delt isometrics     Ext, flex, abd 3"x10 ea Ext, flex, abd 3"x 15ea Ext, flex, abd 3"x 15ea Ext, flex, abd 3"x 15ea Ext, flex, abd 3"x 15ea Ext, flex, abd 3"x 15ea   Submax IR/ER isometrics      3"x15 ea  3"x15 ea  3"x15 ea    AAROM- supine flexion w PT assist  w cane 9 + 10     2x10  2x10 2x10 w cane x6; w PT x10 w cane 2x10  w cane 2x10   w PT 2x10   AAROM- light supine abd w PT assist     x10  x10        scap elevation/ shrugs      2x10  2x10  2x10  2x10     Scapular PNF AAROM/AROM      8 min  8 min 8 min    Ther Ex             PROM per protocol 10'    15' 10' 10' 10' 10' 10'   Pendulums      x30  x30        pulleys 5'    3' 5'  5' 5' 5' 5'   Shoulder AAROM Chest press (w opp arm)  Cane 2x10      10+8  Cane 2x10  Cane 2x10  Cane 2x10    AAROM Ball roll flexion x20      2x10 Flex/scap 2x10 ea Flex 2x10  Flex 2x10    AAROM Table slide flexion elevated table 2x10       2x10       Finger ladder AAROM 2x10        x20  x20 x20    Standing cane flex- AAROM          x6   Standing flex w PT assist  2x10          2x10    Standing scaption w PT assist 2x10             Ther Activity Gait Training                                       Modalities             CP R GH 10'    10' 10' decline 10' 10' 10'

## 2021-05-24 ENCOUNTER — OFFICE VISIT (OUTPATIENT)
Dept: PHYSICAL THERAPY | Facility: CLINIC | Age: 61
End: 2021-05-24
Payer: COMMERCIAL

## 2021-05-24 DIAGNOSIS — Z96.611 STATUS POST REVERSE TOTAL REPLACEMENT OF RIGHT SHOULDER: Primary | ICD-10-CM

## 2021-05-24 PROCEDURE — 97112 NEUROMUSCULAR REEDUCATION: CPT | Performed by: PHYSICAL THERAPIST

## 2021-05-24 PROCEDURE — 97110 THERAPEUTIC EXERCISES: CPT | Performed by: PHYSICAL THERAPIST

## 2021-05-24 NOTE — PROGRESS NOTES
Daily Note     Today's date: 2021  Patient name: Belén Nova  : 1960  MRN: [id number]  Referring provider: April Goodson DO  Dx:   Encounter Diagnosis     ICD-10-CM    1  Status post reverse total replacement of right shoulder  Z96 611                   Subjective: Patient states she's having a little soreness today across her incision  Objective: See treatment diary below      Assessment: Tolerated treatment well  Less soreness following light scar massage  Patient educated in performance of this at home  Improved AROM flexion and chest press, indicating improved strength overall  Patient would benefit from continued PT      Plan: Continue per plan of care        Precautions: h/o L reverse TSA, R reverse TSA 3/3/21        Manuals 5/20 5/24   4/30 5/3 5/6 5/10 5/13 5/17   Re-eval [place]                                                   Neuro Re-Ed             PSR x20 x20       3"x20  x20   scap pinches 2x10    3"x20 3"x20  3"x20  3"x20     scap depression      3"x20        submax delt isometrics  Ext, flex, abd 3"x15 ea   Ext, flex, abd 3"x10 ea Ext, flex, abd 3"x 15ea Ext, flex, abd 3"x 15ea Ext, flex, abd 3"x 15ea Ext, flex, abd 3"x 15ea Ext, flex, abd 3"x 15ea   Submax IR/ER isometrics  w PT in supine x30"     3"x15 ea  3"x15 ea  3"x15 ea    AAROM- supine flexion w PT assist  w cane 9 + 10  w cane 2x10; w PT 2x10    2x10  2x10 2x10 w cane x6; w PT x10 w cane 2x10  w cane 2x10   w PT 2x10   AAROM- light supine abd w PT assist     x10  x10        scap elevation/ shrugs      2x10  2x10  2x10  2x10     Scapular PNF AAROM/AROM      8 min  8 min 8 min    Ther Ex             PROM per protocol 10' 10'   15' 10' 10' 10' 10' 10'   Pendulums      x30  x30        pulleys 5' 5'   3' 5'  5' 5' 5' 5'   Shoulder AAROM Chest press (w opp arm)  Cane 2x10  Cane 2x10     10+8  Cane 2x10  Cane 2x10  Cane 2x10    AAROM Ball roll flexion x20 x20      2x10 Flex/scap 2x10 ea Flex 2x10  Flex 2x10    AAROM Table slide flexion elevated table 2x10  NV     2x10       Finger ladder AAROM 2x10  2x10       x20  x20 x20    Standing cane flex- AAROM          x6   Standing flex w PT assist  2x10          2x10    Standing scaption w PT assist 2x10             Ther Activity                                       Gait Training                                       Modalities             CP R GH 10' 10'   10' 10' decline 10' 10' 10'

## 2021-05-25 ENCOUNTER — HOSPITAL ENCOUNTER (OUTPATIENT)
Dept: INFUSION CENTER | Facility: CLINIC | Age: 61
Discharge: HOME | End: 2021-05-25
Payer: COMMERCIAL

## 2021-05-25 VITALS
DIASTOLIC BLOOD PRESSURE: 81 MMHG | TEMPERATURE: 96.9 F | RESPIRATION RATE: 18 BRPM | SYSTOLIC BLOOD PRESSURE: 158 MMHG | HEART RATE: 91 BPM

## 2021-05-25 DIAGNOSIS — D50.8 IRON DEFICIENCY ANEMIA SECONDARY TO INADEQUATE DIETARY IRON INTAKE: Primary | ICD-10-CM

## 2021-05-25 PROCEDURE — 96365 THER/PROPH/DIAG IV INF INIT: CPT

## 2021-05-25 RX ORDER — SODIUM CHLORIDE 9 MG/ML
20 INJECTION, SOLUTION INTRAVENOUS ONCE
Status: COMPLETED | OUTPATIENT
Start: 2021-05-25 | End: 2021-05-25

## 2021-05-25 RX ORDER — SODIUM CHLORIDE 9 MG/ML
20 INJECTION, SOLUTION INTRAVENOUS ONCE
Status: CANCELLED | OUTPATIENT
Start: 2021-06-01

## 2021-05-25 RX ADMIN — SODIUM CHLORIDE 20 ML/HR: 0.9 INJECTION, SOLUTION INTRAVENOUS at 13:37

## 2021-05-25 RX ADMIN — IRON SUCROSE 300 MG: 20 INJECTION, SOLUTION INTRAVENOUS at 13:48

## 2021-05-25 NOTE — PLAN OF CARE
Problem: Potential for Falls  Goal: Patient will remain free of falls  Description: INTERVENTIONS:  - Assess patient frequently for physical needs  -  Identify cognitive and physical deficits and behaviors that affect risk of falls  -  [place] fall precautions as indicated by assessment   - Educate patient/family on patient safety including physical limitations  - Instruct patient to call for assistance with activity based on assessment  - Modify environment to reduce risk of injury  - Consider OT/PT consult to assist with strengthening/mobility  Outcome: Progressing     Problem: SAFETY ADULT  Goal: Patient will remain free of falls  Description: INTERVENTIONS:  - Assess patient frequently for physical needs  -  Identify cognitive and physical deficits and behaviors that affect risk of falls  -  [place] fall precautions as indicated by assessment   - Educate patient/family on patient safety including physical limitations  - Instruct patient to call for assistance with activity based on assessment  - Modify environment to reduce risk of injury  - Consider OT/PT consult to assist with strengthening/mobility  Outcome: Progressing     Problem: Knowledge Deficit  Goal: Patient/family/caregiver demonstrates understanding of disease process, treatment plan, medications, and discharge instructions  Description: Complete learning assessment and assess knowledge base    Interventions:  - Provide teaching at level of understanding  - Provide teaching via preferred learning methods  Outcome: Progressing

## 2021-05-25 NOTE — PROGRESS NOTES
Pt here for venofer, offering no complaints at present time  Left wrist IV placed with positive blood return noted  Tolerated infusion without incident  PIV removed  AVS declined  Walked out in stable condition

## 2021-05-27 ENCOUNTER — OFFICE VISIT (OUTPATIENT)
Dept: PHYSICAL THERAPY | Facility: CLINIC | Age: 61
End: 2021-05-27
Payer: COMMERCIAL

## 2021-05-27 DIAGNOSIS — Z96.611 STATUS POST REVERSE TOTAL REPLACEMENT OF RIGHT SHOULDER: Primary | ICD-10-CM

## 2021-05-27 PROCEDURE — 97110 THERAPEUTIC EXERCISES: CPT | Performed by: PHYSICAL THERAPIST

## 2021-05-27 PROCEDURE — 97112 NEUROMUSCULAR REEDUCATION: CPT | Performed by: PHYSICAL THERAPIST

## 2021-05-27 NOTE — PROGRESS NOTES
Daily Note     Today's date: 2021  Patient name: Paul Mcintyre  : 1960  MRN: [id number]  Referring provider: Yasir Giraldo DO  Dx:   Encounter Diagnosis     ICD-10-CM    1  Status post reverse total replacement of right shoulder  Z96 611                   Subjective: Patient reports feeling better after last visit  Objective: See treatment diary below      Assessment: Tolerated treatment well  Patient able to perform AAROM supine, but elevated on table, which indicates improved strength  She is challenged with chest press with cane  All exercises performed "to tolerance" only  No issues during or post session  Patient would benefit from continued PT      Plan: Continue per plan of care        Precautions: h/o L reverse TSA, R reverse TSA 3/3/21        Manuals 5/20 5/24 5/27  4/30 5/3 5/6 5/10 5/13 5/17   Re-eval KS                                                   Neuro Re-Ed             PSR x20 x20       3"x20  x20   scap pinches 2x10    3"x20 3"x20  3"x20  3"x20     scap depression      3"x20        submax delt isometrics  Ext, flex, abd 3"x15 ea HEP today  Ext, flex, abd 3"x10 ea Ext, flex, abd 3"x 15ea Ext, flex, abd 3"x 15ea Ext, flex, abd 3"x 15ea Ext, flex, abd 3"x 15ea Ext, flex, abd 3"x 15ea   Submax IR/ER isometrics  w PT in supine x30"  HEP today   3"x15 ea  3"x15 ea  3"x15 ea    AAROM- supine flexion w PT assist  w cane 9 + 10  w cane 2x10; w PT 2x10  elevated on table w cane 2x10   2x10  2x10 2x10 w cane x6; w PT x10 w cane 2x10  w cane 2x10   w PT 2x10   AAROM- light supine abd w PT assist     x10  x10        scap elevation/ shrugs      2x10  2x10  2x10  2x10     Scapular PNF AAROM/AROM      8 min  8 min 8 min    Ther Ex             PROM per protocol 10' 10' 10'  15' 10' 10' 10' 10' 10'   Pendulums      x30  x30        pulleys 5' 5' 5'  3' 5'  5' 5' 5' 5'   Shoulder AAROM Chest press (w opp arm)  Cane 2x10  Cane 2x10  Cane elevated on table 6 + 7 + 6    10+8  Cane 2x10  Cane 2x10  Cane 2x10    AAROM Ball roll flexion x20 x20  x20     2x10 Flex/scap 2x10 ea Flex 2x10  Flex 2x10    AAROM Elevated Table slide flexion elevated table 2x10  NV Elevated table 2x10     2x10       Finger ladder AAROM 2x10  2x10  2x10      x20  x20 x20    Standing cane flex- AAROM          x6   Standing flex w PT assist  2x10          2x10    Standing scaption w PT assist 2x10             Ther Activity                                       Gait Training                                       Modalities             CP R GH 10' 10' 10'  10' 10' decline 10' 10' 10'

## 2021-06-01 ENCOUNTER — HOSPITAL ENCOUNTER (OUTPATIENT)
Dept: INFUSION CENTER | Facility: CLINIC | Age: 61
Discharge: HOME | End: 2021-06-01
Payer: COMMERCIAL

## 2021-06-01 VITALS
SYSTOLIC BLOOD PRESSURE: 130 MMHG | HEART RATE: 95 BPM | TEMPERATURE: 98.6 F | DIASTOLIC BLOOD PRESSURE: 74 MMHG | RESPIRATION RATE: 18 BRPM

## 2021-06-01 DIAGNOSIS — D50.8 IRON DEFICIENCY ANEMIA SECONDARY TO INADEQUATE DIETARY IRON INTAKE: Primary | ICD-10-CM

## 2021-06-01 PROCEDURE — 96365 THER/PROPH/DIAG IV INF INIT: CPT

## 2021-06-01 RX ORDER — SODIUM CHLORIDE 9 MG/ML
20 INJECTION, SOLUTION INTRAVENOUS ONCE
Status: COMPLETED | OUTPATIENT
Start: 2021-06-01 | End: 2021-06-01

## 2021-06-01 RX ORDER — SODIUM CHLORIDE 9 MG/ML
20 INJECTION, SOLUTION INTRAVENOUS ONCE
Status: CANCELLED | OUTPATIENT
Start: 2021-06-01

## 2021-06-01 RX ADMIN — SODIUM CHLORIDE 20 ML/HR: 0.9 INJECTION, SOLUTION INTRAVENOUS at 14:13

## 2021-06-01 RX ADMIN — IRON SUCROSE 300 MG: 20 INJECTION, SOLUTION INTRAVENOUS at 14:15

## 2021-06-01 NOTE — PROGRESS NOTES
Pt here for venofer infusion, offering no complaints  Left hand IV placed with positive blood return noted  Tolerated infusion without incident  PIV removed  AVS declined  She is all done with her treatments for now  Walked out in stable condition

## 2021-06-02 ENCOUNTER — OFFICE VISIT (OUTPATIENT)
Dept: PHYSICAL THERAPY | Facility: CLINIC | Age: 61
End: 2021-06-02
Payer: COMMERCIAL

## 2021-06-02 DIAGNOSIS — Z96.611 STATUS POST REVERSE TOTAL REPLACEMENT OF RIGHT SHOULDER: Primary | ICD-10-CM

## 2021-06-02 PROCEDURE — 97112 NEUROMUSCULAR REEDUCATION: CPT | Performed by: PHYSICAL THERAPIST

## 2021-06-02 PROCEDURE — 97110 THERAPEUTIC EXERCISES: CPT | Performed by: PHYSICAL THERAPIST

## 2021-06-02 NOTE — PROGRESS NOTES
Daily Note     Today's date: 2021  Patient name: Paul Mcintyre  : 1960  MRN: [id number]  Referring provider: Yasir Giraldo DO  Dx:   Encounter Diagnosis     ICD-10-CM    1  Status post reverse total replacement of right shoulder  Z96 611                   Subjective: Patient states she's doing well today  She reports improved ability to perform exercises and move her arm  "It's coming a long "       Objective: See treatment diary below      Assessment: Tolerated treatment well  Patient able to perform 2 sets of 10 of cane chest press and flexion, while elevated on table  This is an improvement from recent sessions  She remains most limited with AROM of shoulder, but should continue to work in PT and at home on Mya  No complaints of pain during or post session  Patient would benefit from continued PT      Plan: Continue per plan of care        Precautions: h/o L reverse TSA, R reverse TSA 3/3/21        Manuals          Re-eval KS                                                   Neuro Re-Ed             PSR x20 x20           scap pinches 2x10            scap depression             submax delt isometrics  Ext, flex, abd 3"x15 ea HEP today Ext, flex, abd 3"x15 ea         Submax IR/ER isometrics  w PT in supine x30"  HEP today ER/IR 3"x15 ea          AAROM- supine flexion w PT assist  w cane 9 + 10  w cane 2x10; w PT 2x10  elevated on table w cane 2x10  elevated on table w cane 2x10          AAROM- light supine abd w PT assist             scap elevation/ shrugs              Scapular PNF AAROM/AROM             Ther Ex             PROM per protocol 10' 10' 10' 8'         Pendulums              pulleys 5' 5' 5' 5'         Shoulder AAROM Chest press (w opp arm)  Cane 2x10  Cane 2x10  Cane elevated on table 6 + 7 + 6  Cane elevated on table 2x10          AAROM Ball roll flexion x20 x20  x20  x30         AAROM Elevated Table slide flexion elevated table 2x10  NV Elevated table 2x10  Elevated table slide 3x10          Finger ladder AAROM 2x10  2x10  2x10  2x10          Standing cane flex- AAROM             Standing flex w PT assist  2x10             Standing scaption w PT assist 2x10             Ther Activity                                       Gait Training                                       Modalities             CP R GH 10' 10' 10' 10'

## 2021-06-04 ENCOUNTER — OFFICE VISIT (OUTPATIENT)
Dept: PHYSICAL THERAPY | Facility: CLINIC | Age: 61
End: 2021-06-04
Payer: COMMERCIAL

## 2021-06-04 DIAGNOSIS — Z96.611 STATUS POST REVERSE TOTAL REPLACEMENT OF RIGHT SHOULDER: Primary | ICD-10-CM

## 2021-06-04 PROCEDURE — 97110 THERAPEUTIC EXERCISES: CPT

## 2021-06-04 PROCEDURE — 97112 NEUROMUSCULAR REEDUCATION: CPT

## 2021-06-04 NOTE — PROGRESS NOTES
Daily Note     Today's date: 2021  Patient name: Ramya Rutherford  : 1960  MRN: [id number]  Referring provider: Danya Robertson DO  Dx:   Encounter Diagnosis     ICD-10-CM    1  Status post reverse total replacement of right shoulder  Z96 611                   Subjective: Patient reports that she is doing well today with no complaints of pain at rest  Notes that when she first moves it while doing her exercises it bothers her but then goes away  Objective: See treatment diary below      Assessment: Tolerated treatment well  Patient able to perform 2 sets of 10 of cane chest press and flexion, with less elevation today feeling it work more  A tight end feel was present in all directions  Challenged with initial movements but once she is in the motion, she does well  She was happy with her progress at the end of session feeling good overall  Patient would benefit from continued PT      Plan: Continue per plan of care        Precautions: h/o L reverse TSA, R reverse TSA 3/3/21        Manuals         Re-eval KS                                                   Neuro Re-Ed             PSR x20 x20           scap pinches 2x10            scap depression             submax delt isometrics  Ext, flex, abd 3"x15 ea HEP today Ext, flex, abd 3"x15 ea Ext, flex, abd 3"x15 ea        Submax IR/ER isometrics  w PT in supine x30"  HEP today ER/IR 3"x15 ea  ER/IR 3"x15 ea        AAROM- supine flexion w PT assist  w cane 9 + 10  w cane 2x10; w PT 2x10  elevated on table w cane 2x10  elevated on table w cane 2x10  elevated on table w cane 2x10         AAROM- light supine abd w PT assist             scap elevation/ shrugs              Scapular PNF AAROM/AROM             Ther Ex             PROM per protocol 10' 10' 10' 8' 8'        Pendulums              pulleys 5' 5' 5' 5' 5'        Shoulder AAROM Chest press (w opp arm)  Cane 2x10  Cane 2x10  Cane elevated on table 6 + 7 + 6  Cane elevated on table 2x10  Cane elevated on table 2x10        AAROM Ball roll flexion x20 x20  x20  x30 x30        AAROM Elevated Table slide flexion elevated table 2x10  NV Elevated table 2x10  Elevated table slide 3x10  Elevated table slide 3x10         Finger ladder AAROM 2x10  2x10  2x10  2x10  2x10        Standing cane flex- AAROM             Standing flex w PT assist  2x10             Standing scaption w PT assist 2x10             Ther Activity                                       Gait Training                                       Modalities             CP R GH 10' 10' 10' 10' 10'

## 2021-06-07 ENCOUNTER — OFFICE VISIT (OUTPATIENT)
Dept: PHYSICAL THERAPY | Facility: CLINIC | Age: 61
End: 2021-06-07
Payer: COMMERCIAL

## 2021-06-07 DIAGNOSIS — Z96.611 STATUS POST REVERSE TOTAL REPLACEMENT OF RIGHT SHOULDER: Primary | ICD-10-CM

## 2021-06-07 PROCEDURE — 97110 THERAPEUTIC EXERCISES: CPT | Performed by: PHYSICAL THERAPIST

## 2021-06-07 PROCEDURE — 97112 NEUROMUSCULAR REEDUCATION: CPT | Performed by: PHYSICAL THERAPIST

## 2021-06-07 NOTE — PROGRESS NOTES
Daily Note     Today's date: 2021  Patient name: Derik Epps  : 1960  MRN: [id number]  Referring provider: Lavonne Sal DO  Dx:   Encounter Diagnosis     ICD-10-CM    1  Status post reverse total replacement of right shoulder  Z96 611                   Subjective: Patient stated no significant pain prior to treatment session  Objective: See treatment diary below      Assessment: Patient performed exercises without c/o pain; minimal pain with manual shoulder PROM  Plan: Continue per plan of care        Precautions: h/o L reverse TSA, R reverse TSA 3/3/21        Manuals        Re-eval KS                                                   Neuro Re-Ed             PSR x20 x20           scap pinches 2x10            scap depression             submax delt isometrics  Ext, flex, abd 3"x15 ea HEP today Ext, flex, abd 3"x15 ea Ext, flex, abd 3"x15 ea Ext, flex, abd 3"x15 ea       Submax IR/ER isometrics  w PT in supine x30"  HEP today ER/IR 3"x15 ea  ER/IR 3"x15 ea ER/IR 3"x15 ea       AAROM- supine flexion w PT assist  w cane 9 + 10  w cane 2x10; w PT 2x10  elevated on table w cane 2x10  elevated on table w cane 2x10  elevated on table w cane 2x10  elevated on table w cane 2x10       AAROM- light supine abd w PT assist             scap elevation/ shrugs              Scapular PNF AAROM/AROM             Ther Ex             PROM per protocol 10' 10' 10' 8' 8' 8'       Pendulums              pulleys 5' 5' 5' 5' 5' 5'       Shoulder AAROM Chest press (w opp arm)  Cane 2x10  Cane 2x10  Cane elevated on table 6 + 7 + 6  Cane elevated on table 2x10  Cane elevated on table 2x10 Cane elevated on table 2x10       AAROM Ball roll flexion x20 x20  x20  x30 x30 x30       AAROM Elevated Table slide flexion elevated table 2x10  NV Elevated table 2x10  Elevated table slide 3x10  Elevated table slide 3x10  Elevated table slide 3x10        Finger ladder AAROM 2x10  2x10  2x10 2x10  2x10 2x10       Standing cane flex- AAROM             Standing flex w PT assist  2x10             Standing scaption w PT assist 2x10             Ther Activity                                       Gait Training                                       Modalities             CP R GH 10' 10' 10' 10' 10' 10'

## 2021-06-09 ENCOUNTER — APPOINTMENT (OUTPATIENT)
Dept: RADIOLOGY | Facility: CLINIC | Age: 61
End: 2021-06-09
Payer: COMMERCIAL

## 2021-06-09 ENCOUNTER — OFFICE VISIT (OUTPATIENT)
Dept: OBGYN CLINIC | Facility: CLINIC | Age: 61
End: 2021-06-09
Payer: COMMERCIAL

## 2021-06-09 VITALS
RESPIRATION RATE: 20 BRPM | SYSTOLIC BLOOD PRESSURE: 154 MMHG | DIASTOLIC BLOOD PRESSURE: 102 MMHG | HEART RATE: 91 BPM | WEIGHT: 250 LBS | BODY MASS INDEX: 47.2 KG/M2 | HEIGHT: 61 IN

## 2021-06-09 DIAGNOSIS — Z96.611 STATUS POST REVERSE TOTAL REPLACEMENT OF RIGHT SHOULDER: Primary | ICD-10-CM

## 2021-06-09 DIAGNOSIS — Z96.611 STATUS POST REVERSE TOTAL REPLACEMENT OF RIGHT SHOULDER: ICD-10-CM

## 2021-06-09 PROCEDURE — 99213 OFFICE O/P EST LOW 20 MIN: CPT | Performed by: ORTHOPAEDIC SURGERY

## 2021-06-09 PROCEDURE — 3008F BODY MASS INDEX DOCD: CPT | Performed by: STUDENT IN AN ORGANIZED HEALTH CARE EDUCATION/TRAINING PROGRAM

## 2021-06-09 PROCEDURE — 73030 X-RAY EXAM OF SHOULDER: CPT

## 2021-06-10 ENCOUNTER — OFFICE VISIT (OUTPATIENT)
Dept: PHYSICAL THERAPY | Facility: CLINIC | Age: 61
End: 2021-06-10
Payer: COMMERCIAL

## 2021-06-10 DIAGNOSIS — Z96.611 STATUS POST REVERSE TOTAL REPLACEMENT OF RIGHT SHOULDER: Primary | ICD-10-CM

## 2021-06-10 PROCEDURE — 97110 THERAPEUTIC EXERCISES: CPT | Performed by: PHYSICAL THERAPIST

## 2021-06-10 PROCEDURE — 97112 NEUROMUSCULAR REEDUCATION: CPT | Performed by: PHYSICAL THERAPIST

## 2021-06-10 NOTE — PROGRESS NOTES
Daily Note     Today's date: 6/10/2021  Patient name: Damian Bradley  : 1960  MRN: [id number]  Referring provider: Anders Quinonez DO  Dx:   Encounter Diagnosis     ICD-10-CM    1  Status post reverse total replacement of right shoulder  Z96 611                   Subjective: The patient reports that she saw the doctor yesterday and so far he is happy with how she is doing  He was moving her arm around and she is a little tender today  She will go back to see him in about six weeks for her next appointment  Objective: See treatment diary below  Assessment: Good tolerance to all TE today  No increase in pain noted during session today  Patient notes less catching in her arm with forward flexion with PBall  She demonstrates decreased ROM and strength  Continued PT would be beneficial to improve function  Plan: Continue per plan of care        Precautions: h/o L reverse TSA, R reverse TSA 3/3/21        Manuals 5/20 5/24 5/27 6/2 6/4 6/7 6/10      Re-eval KS                                                   Neuro Re-Ed             PSR x20 x20           scap pinches 2x10            scap depression             submax delt isometrics  Ext, flex, abd 3"x15 ea HEP today Ext, flex, abd 3"x15 ea Ext, flex, abd 3"x15 ea Ext, flex, abd 3"x15 ea Ext, flex, abd 3" x 15 each      Submax IR/ER isometrics  w PT in supine x30"  HEP today ER/IR 3"x15 ea  ER/IR 3"x15 ea ER/IR 3"x15 ea ER/IR   3"x15 ea      AAROM- supine flexion w PT assist  w cane 9 + 10  w cane 2x10; w PT 2x10  elevated on table w cane 2x10  elevated on table w cane 2x10  elevated on table w cane 2x10  elevated on table w cane 2x10 elevated on table with cane 20x      AAROM- light supine abd w PT assist             scap elevation/ shrugs              Scapular PNF AAROM/AROM             Ther Ex             PROM per protocol 10' 10' 10' 8' 8' 8' 8'      Pendulums              pulleys 5' 5' 5' 5' 5' 5' 5'      Shoulder AAROM Chest press (w opp arm)  Cane 2x10  Cane 2x10  Cane elevated on table 6 + 7 + 6  Cane elevated on table 2x10  Cane elevated on table 2x10 Cane elevated on table 2x10 Cane elevated on table 2x10      AAROM Ball roll flexion x20 x20  x20  x30 x30 x30 30x      AAROM Elevated Table slide flexion elevated table 2x10  NV Elevated table 2x10  Elevated table slide 3x10  Elevated table slide 3x10  Elevated table slide 3x10  Elevated table slide 3x10      Finger ladder AAROM 2x10  2x10  2x10  2x10  2x10 2x10 2x10      Standing cane flex- AAROM             Standing flex w PT assist  2x10             Standing scaption w PT assist 2x10             Ther Activity                                       Gait Training                                       Modalities             CP R GH 10' 10' 10' 10' 10' 10' 10'

## 2021-06-14 ENCOUNTER — OFFICE VISIT (OUTPATIENT)
Dept: PHYSICAL THERAPY | Facility: CLINIC | Age: 61
End: 2021-06-14
Payer: COMMERCIAL

## 2021-06-14 DIAGNOSIS — Z96.611 STATUS POST REVERSE TOTAL REPLACEMENT OF RIGHT SHOULDER: Primary | ICD-10-CM

## 2021-06-14 PROCEDURE — 97112 NEUROMUSCULAR REEDUCATION: CPT | Performed by: PHYSICAL THERAPIST

## 2021-06-14 PROCEDURE — 97110 THERAPEUTIC EXERCISES: CPT | Performed by: PHYSICAL THERAPIST

## 2021-06-14 NOTE — PROGRESS NOTES
Daily Note     Today's date: 2021  Patient name: Derik Epps  : 1960  MRN: [id number]  Referring provider: Lavonne Sal DO  Dx:   Encounter Diagnosis     ICD-10-CM    1  Status post reverse total replacement of right shoulder  Z96 611                   Subjective: Patient denies any pain at start of session  Objective: See treatment diary below  Assessment: Good tolerance to all TE today  Patient able to progress program to include side lying abduction and ER  Slight soreness during standing cane flexion (AAROM), but with minimal assist from PT, she's able to do without pain  Overall, she does exhibit improved AAROM, but still has been unable to perform active elevation of right shoulder  Plan: Continue per plan of care        Precautions: h/o L reverse TSA, R reverse TSA 3/3/21        Manuals 5/20 5/24 5/27 6/2 6/4 6/7 6/10 6/14     Re-eval KS                                                   Neuro Re-Ed             PSR x20 x20      x20     scap pinches 2x10            scap depression             submax delt isometrics  Ext, flex, abd 3"x15 ea HEP today Ext, flex, abd 3"x15 ea Ext, flex, abd 3"x15 ea Ext, flex, abd 3"x15 ea Ext, flex, abd 3" x 15 each      Submax IR/ER isometrics  w PT in supine x30"  HEP today ER/IR 3"x15 ea  ER/IR 3"x15 ea ER/IR 3"x15 ea ER/IR   3"x15 ea      AAROM- supine flexion w PT assist  w cane 9 + 10  w cane 2x10; w PT 2x10  elevated on table w cane 2x10  elevated on table w cane 2x10  elevated on table w cane 2x10  elevated on table w cane 2x10 elevated on table with cane 20x On elevated table w cane x20     sidelying shoulder abd to 90          2x10      sidelying shoulder ER         2x10      AAROM standing abd w cane         2x10      Ther Ex             PROM per protocol 10' 10' 10' 8' 8' 8' 8' 10'     Pendulums              pulleys 5' 5' 5' 5' 5' 5' 5' 5'     Shoulder AAROM Chest press (w opp arm)  Cane 2x10  Cane 2x10  Cane elevated on table 6 + 7 + 6  Cane elevated on table 2x10  Cane elevated on table 2x10 Cane elevated on table 2x10 Cane elevated on table 2x10 Cane elevated on table 2x10     AAROM Ball roll flexion x20 x20  x20  x30 x30 x30 30x x20      AAROM Elevated Table slide flexion elevated table 2x10  NV Elevated table 2x10  Elevated table slide 3x10  Elevated table slide 3x10  Elevated table slide 3x10  Elevated table slide 3x10 Elevated table slide 2x10     Finger ladder AAROM 2x10  2x10  2x10  2x10  2x10 2x10 2x10 2x10      Standing cane flex- AAROM        w PT assist 2x10      Standing flex w PT assist  2x10             Standing scaption w PT assist 2x10             Ther Activity                                       Gait Training                                       Modalities             CP R GH 10' 10' 10' 10' 10' 10' 10' 10'

## 2021-06-17 ENCOUNTER — OFFICE VISIT (OUTPATIENT)
Dept: PHYSICAL THERAPY | Facility: CLINIC | Age: 61
End: 2021-06-17
Payer: COMMERCIAL

## 2021-06-17 DIAGNOSIS — Z96.611 STATUS POST REVERSE TOTAL REPLACEMENT OF RIGHT SHOULDER: Primary | ICD-10-CM

## 2021-06-17 PROCEDURE — 97112 NEUROMUSCULAR REEDUCATION: CPT | Performed by: PHYSICAL THERAPIST

## 2021-06-17 PROCEDURE — 97110 THERAPEUTIC EXERCISES: CPT | Performed by: PHYSICAL THERAPIST

## 2021-06-17 NOTE — PROGRESS NOTES
Daily Note     Today's date: 2021  Patient name: Bernardo Naranjo  : 1960  MRN: [id number]  Referring provider: Jasmina Julien DO  Dx:   Encounter Diagnosis     ICD-10-CM    1  Status post reverse total replacement of right shoulder  Z96 611                   Subjective: Patient states she feels that her active ROM is improving  Objective: See treatment diary below  Assessment: Increased time spent trying to progress patient's AROM of flexion  She requires less PT assist during standing flexion/abduction ROM  She is also able to progress resisted exercises, per protocol  Plan: Continue per plan of care        Precautions: h/o L reverse TSA, R reverse TSA 3/3/21        Manuals 5/20 5/24 5/27 6/2 6/4 6/7 6/10 6/14 6/17    Re-eval KS                                                   Neuro Re-Ed             PSR x20 x20      x20     scap pinches 2x10            scap depression             submax delt isometrics  Ext, flex, abd 3"x15 ea HEP today Ext, flex, abd 3"x15 ea Ext, flex, abd 3"x15 ea Ext, flex, abd 3"x15 ea Ext, flex, abd 3" x 15 each      Submax IR/ER isometrics  w PT in supine x30"  HEP today ER/IR 3"x15 ea  ER/IR 3"x15 ea ER/IR 3"x15 ea ER/IR   3"x15 ea      AAROM- supine flexion w PT assist  w cane 9 + 10  w cane 2x10; w PT 2x10  elevated on table w cane 2x10  elevated on table w cane 2x10  elevated on table w cane 2x10  elevated on table w cane 2x10 elevated on table with cane 20x On elevated table w cane x20 On elevated table w cane x20    sidelying shoulder abd to 90          2x10  NV    sidelying shoulder ER         2x10  NV    AAROM standing abd w cane         2x10  2x10     Ther Ex             PROM per protocol 10' 10' 10' 8' 8' 8' 8' 10' 10'    Pendulums              pulleys 5' 5' 5' 5' 5' 5' 5' 5' 5'    Shoulder AAROM Chest press (w opp arm)  Cane 2x10  Cane 2x10  Cane elevated on table 6 + 7 + 6  Cane elevated on table 2x10  Cane elevated on table 2x10 [employer] elevated on table 2x10 Cane elevated on table 2x10 Cane elevated on table 2x10 Cane elevated on table 2x10    AAROM Ball roll flexion x20 x20  x20  x30 x30 x30 30x x20  x20     AAROM Elevated Table slide flexion elevated table 2x10  NV Elevated table 2x10  Elevated table slide 3x10  Elevated table slide 3x10  Elevated table slide 3x10  Elevated table slide 3x10 Elevated table slide 2x10 Elevated table slide 2x10    Finger ladder AAROM 2x10  2x10  2x10  2x10  2x10 2x10 2x10 2x10  2x10     Standing cane flex- AAROM        w PT assist 2x10  w PT assist 2x10     Bicep curl         1# x10; 2# x10    Tricep kickback         0# x10; 1# x10     Ther Activity                                       Gait Training                                       Modalities             CP R GH 10' 10' 10' 10' 10' 10' 10' 10' 10'

## 2021-06-21 ENCOUNTER — OFFICE VISIT (OUTPATIENT)
Dept: PHYSICAL THERAPY | Facility: CLINIC | Age: 61
End: 2021-06-21
Payer: COMMERCIAL

## 2021-06-21 DIAGNOSIS — Z96.611 STATUS POST REVERSE TOTAL REPLACEMENT OF RIGHT SHOULDER: Primary | ICD-10-CM

## 2021-06-21 PROCEDURE — 97112 NEUROMUSCULAR REEDUCATION: CPT | Performed by: PHYSICAL THERAPIST

## 2021-06-21 PROCEDURE — 97110 THERAPEUTIC EXERCISES: CPT | Performed by: PHYSICAL THERAPIST

## 2021-06-21 NOTE — PROGRESS NOTES
Daily Note     Today's date: 2021  Patient name: Nubia Orta  : 1960  MRN: [id number]  Referring provider: Adam Cullen DO  Dx:   Encounter Diagnosis     ICD-10-CM    1  Status post reverse total replacement of right shoulder  Z96 611                   Subjective: Patient reports improvements with ADLs at waist level  She states showering is easier  Objective: See treatment diary below      Assessment: Tolerated treatment fair  No pain during session, but strength/AROM is very slow to improve  Recommend use of BFR; contraindications reviewed with patient  None present  She will review BFR info and if physician in agreement, will begin this next visit  Patient would benefit from continued PT      Plan: Progress treatment as tolerated  with use of BFR        Precautions: h/o L reverse TSA, R reverse TSA 3/3/21        Manuals 5/20 5/24 5/27 6/2 6/4 6/7 6/10 6/14 6/17 6/21   Re-eval KS                                                   Neuro Re-Ed             PSR x20 x20      x20     scap pinches 2x10            scap depression             submax delt isometrics  Ext, flex, abd 3"x15 ea HEP today Ext, flex, abd 3"x15 ea Ext, flex, abd 3"x15 ea Ext, flex, abd 3"x15 ea Ext, flex, abd 3" x 15 each      Submax IR/ER isometrics  w PT in supine x30"  HEP today ER/IR 3"x15 ea  ER/IR 3"x15 ea ER/IR 3"x15 ea ER/IR   3"x15 ea      AAROM- supine flexion w PT assist  w cane 9 + 10  w cane 2x10; w PT 2x10  elevated on table w cane 2x10  elevated on table w cane 2x10  elevated on table w cane 2x10  elevated on table w cane 2x10 elevated on table with cane 20x On elevated table w cane x20 On elevated table w cane x20 On elevated table w cane 2x10    sidelying shoulder abd to 90          2x10  NV 2x10    sidelying shoulder ER         2x10  NV 2x10    AAROM standing abd w cane         2x10  2x10     Ther Ex             PROM per protocol 10' 10' 10' 8' 8' 8' 8' 10' 10' 10'   Pendulums pulleys 5' 5' 5' 5' 5' 5' 5' 5' 5' 5'   Shoulder AAROM Chest press (w opp arm)  Cane 2x10  Cane 2x10  Cane elevated on table 6 + 7 + 6  Cane elevated on table 2x10  Cane elevated on table 2x10 Cane elevated on table 2x10 Cane elevated on table 2x10 Cane elevated on table 2x10 Cane elevated on table 2x10 Cane elevated on table 2x10    AAROM Ball roll flexion x20 x20  x20  x30 x30 x30 30x x20  x20  x20    AAROM Elevated Table slide flexion elevated table 2x10  NV Elevated table 2x10  Elevated table slide 3x10  Elevated table slide 3x10  Elevated table slide 3x10  Elevated table slide 3x10 Elevated table slide 2x10 Elevated table slide 2x10 Elevated table slide 2x10   Finger ladder AAROM 2x10  2x10  2x10  2x10  2x10 2x10 2x10 2x10  2x10     Standing cane flex- AAROM        w PT assist 2x10  w PT assist 2x10     Bicep curl         1# x10; 2# x10 NV   Tricep kickback         0# x10; 1# x10  NV   TRX walk through          2x10    Ther Activity                                       Gait Training                                       Modalities             CP R GH 10' 10' 10' 10' 10' 10' 10' 10' 10' 10'

## 2021-06-24 ENCOUNTER — OFFICE VISIT (OUTPATIENT)
Dept: PHYSICAL THERAPY | Facility: CLINIC | Age: 61
End: 2021-06-24
Payer: COMMERCIAL

## 2021-06-24 DIAGNOSIS — Z96.611 STATUS POST REVERSE TOTAL REPLACEMENT OF RIGHT SHOULDER: Primary | ICD-10-CM

## 2021-06-24 PROCEDURE — 97110 THERAPEUTIC EXERCISES: CPT | Performed by: PHYSICAL THERAPIST

## 2021-06-24 PROCEDURE — 97112 NEUROMUSCULAR REEDUCATION: CPT | Performed by: PHYSICAL THERAPIST

## 2021-06-24 NOTE — PROGRESS NOTES
Daily Note     Today's date: 2021  Patient name: Guillermo Rowell  : 1960  MRN: [id number]  Referring provider: Anam Estrella DO  Dx:   Encounter Diagnosis     ICD-10-CM    1  Status post reverse total replacement of right shoulder  Z96 611                   Subjective: Patient reports she's able to raise her arm a little better  Objective: See treatment diary below      Assessment: Tolerated treatment well  She's able to perform standing cane flexion with improved ability this visit  Was going to attempt BFR, but patient's BP is elevated this visit  She denies high BP or any other contraindications,but today her BP is 136/100  She is encouraged to use her home BP cuff and call physician if it remains elevated  She denies any symptoms of high BP  She completes listed exercises without increased pain  Patient would benefit from continued PT      Plan: Progress treatment as tolerated  Precautions: h/o L reverse TSA, R reverse TSA 3/3/21      Increased time spent on BFR set up, monitoring BP, and educating patient on BFR  Ultimately it was not performed this visit     Manuals 6/24    6/4 6/7 6/10 6/14 6/17 6/21   Re-eval                                                    Neuro Re-Ed             PSR        x20     scap pinches             scap depression             submax delt isometrics     Ext, flex, abd 3"x15 ea Ext, flex, abd 3"x15 ea Ext, flex, abd 3" x 15 each      Submax IR/ER isometrics     ER/IR 3"x15 ea ER/IR 3"x15 ea ER/IR   3"x15 ea      AAROM- supine flexion w PT assist      elevated on table w cane 2x10  elevated on table w cane 2x10 elevated on table with cane 20x On elevated table w cane x20 On elevated table w cane x20 On elevated table w cane 2x10    sidelying shoulder abd to 90          2x10  NV 2x10    sidelying shoulder ER         2x10  NV 2x10    AAROM standing abd w cane  2x10       2x10  2x10     TB Row Red 2x10             Ther Ex             PROM per protocol     8' 8' 8' 10' 10' 10'   Pendulums              pulleys 5'    5' 5' 5' 5' 5' 5'   Shoulder AAROM Chest press (w opp arm)  2x10    Cane elevated on table 2x10 Cane elevated on table 2x10 Cane elevated on table 2x10 Cane elevated on table 2x10 Cane elevated on table 2x10 Cane elevated on table 2x10    AAROM Ball roll flexion x30    x30 x30 30x x20  x20  x20    AAROM Elevated Table slide flexion x30    Elevated table slide 3x10  Elevated table slide 3x10  Elevated table slide 3x10 Elevated table slide 2x10 Elevated table slide 2x10 Elevated table slide 2x10   Finger ladder AAROM     2x10 2x10 2x10 2x10  2x10     Standing cane flex- AAROM 2x10       w PT assist 2x10  w PT assist 2x10     Bicep curl         1# x10; 2# x10 NV   Tricep kickback         0# x10; 1# x10  NV   TRX walk through 2x10         2x10    Ther Activity                                       Gait Training                                       Modalities             CP R GH     10' 10' 10' 10' 10' 10'

## 2021-06-25 NOTE — PROGRESS NOTES
Patient Name:  Alice Jensen  MRN:  [id number]    [address] [place] [address]  Status post reverse total replacement of right shoulder  -     XR shoulder 2+ vw right; Future; Expected date: 06/09/2021        X-rays of the right shoulder reviewed in the office and discussed with the patient displaying stable right reverse shoulder arthroplasty  Patient continues to have weakness with active forward elevation though active assist range of motion is improving  She does not have any pain currently  Incision is well healed  Patient will continue formal physical therapy to work on range of motion strengthening  I will see her back for 6 weeks for repeat evaluation  History of the Present Illness   Alice Jensen is a 64 y o  female status post right reverse shoulder arthroplasty performed on 03/03/21  Pain is well controlled  She denies any numbness or tingling  S she still has difficulty with active range of motion  No other new complaints  Review of Systems     Review of Systems   Constitutional: Negative for appetite change and unexpected weight change  HENT: Negative for congestion and trouble swallowing  Eyes: Negative for visual disturbance  Respiratory: Negative for cough and shortness of breath  Cardiovascular: Negative for chest pain and palpitations  Gastrointestinal: Negative for nausea and vomiting  Endocrine: Negative for cold intolerance and heat intolerance  Musculoskeletal: Negative for gait problem and myalgias  Skin: Negative for rash  Neurological: Negative for numbness  Physical Exam     BP (!) 154/102   Pulse 91   Resp 20   Ht 5' 1" (1 549 m)   Wt 113 kg (250 lb)   BMI 47 24 kg/m²     Right Shoulder: Incision is well-healed  There is no erythema or  warmth  Passive range of motion of shoulder 120 forward flexion passive, 90 abduction  Supine active assist forward flexion 90 degrees     Full active range of motion of elbow, wrist, and digits present  The patient is neurovascular intact distally  Data Review     I have personally reviewed pertinent films in PACS, and my interpretation follows  X-rays of the right shoulder reviewed in the office today display stable right reverse shoulder arthroplasty      Social History     Tobacco Use    Smoking status: Current Every Day Smoker     Packs/day: 0 50     Types: Cigarettes    Smokeless tobacco: Never Used   Vaping Use    Vaping Use: Never used   Substance Use Topics    Alcohol use: No    Drug use: No           Procedures    Charis Beltran DO

## 2021-06-28 ENCOUNTER — OFFICE VISIT (OUTPATIENT)
Dept: PHYSICAL THERAPY | Facility: CLINIC | Age: 61
End: 2021-06-28
Payer: COMMERCIAL

## 2021-06-28 DIAGNOSIS — Z96.611 STATUS POST REVERSE TOTAL REPLACEMENT OF RIGHT SHOULDER: Primary | ICD-10-CM

## 2021-06-28 PROCEDURE — 97112 NEUROMUSCULAR REEDUCATION: CPT | Performed by: PHYSICAL THERAPIST

## 2021-06-28 PROCEDURE — 97110 THERAPEUTIC EXERCISES: CPT | Performed by: PHYSICAL THERAPIST

## 2021-06-28 NOTE — PROGRESS NOTES
PT Re-Evaluation     Today's date: 2021  Patient name: Damian Bradley  : 1960  MRN: [id number]  Referring provider: Anders Quinonez DO  Dx:   Encounter Diagnosis     ICD-10-CM    1  Status post reverse total replacement of right shoulder  Z96 611                   Assessment  Assessment details: Patient is a 65 y/o female s/p R reverse TSA on 3/3/21  Since starting therapy, she presents with improved shoulder PROM, AROM, and AAROM  Her exercise tolerance has improved  Most recently since last re-evaluation, she is able to perform more active ROM against gravity  She continues with decreased functional mobility due to increased pain, decreased shoulder strength, decreased shoulder ROM associated with aforementioned surgical procedure  Patient will continue to benefit from skilled physical therapy to address impairment and improve functional mobility  PT needed to allow for return to maximal function and improve quality of life  Patient remains motivated for therapy  Impairments: abnormal or restricted ROM, activity intolerance, impaired physical strength, lacks appropriate home exercise program and pain with function  Understanding of Dx/Px/POC: good   Prognosis: good    Goals  STG within 4 weeks:   1  Patient to be independent in HEP  PARTIALLY MET  2  Reduce pain by 50% to improve quality of life  MET  3  Improve PROM to listed limits on protocol  MET  LTG within 12 weeks:   1  Patient to be independent in ADLs/IADLs without difficulty  IN PROGRESS  2  Patient to achieve functional elevation of shoulder  IN PROGRESS  3  Patient to be able to lift household items   IN PROGRESS    Plan  Plan details: Per Protocol  Patient would benefit from: skilled physical therapy and PT eval  Planned modality interventions: cryotherapy, hydrotherapy and unattended electrical stimulation  Planned therapy interventions: therapeutic training, therapeutic exercise, therapeutic activities, stretching, strengthening, postural training, patient education, neuromuscular re-education, manual therapy, joint mobilization, IADL retraining, activity modification, ADL retraining, ADL training, body mechanics training, flexibility, functional ROM exercises, gait training, graded activity, graded exercise, graded motor and home exercise program  Frequency: 2x week  Duration in weeks: 6  Plan of Care beginning date: 2021  Plan of Care expiration date: 2021  Treatment plan discussed with: patient        Subjective Evaluation    History of Present Illness  Mechanism of injury: Patient is a 65 y/o female s/p R reverse TSA on 3/3/21  Patient had fallen last year and suffered a humeral fracture  She did not regain full AROM and was ultimately scheduled for reverse TSA  Since starting therapy, she reports less shoulder pain and improved ROM  She reports improved use of RUE at waist level and she's starting to notice some improvements with overall right arm use above waist level  She reports less pain overall and is pleased with her progress in therapy  Pain  Current pain ratin  At best pain ratin  At worst pain ratin  Location: posterior right shoulder   Quality: dull ache  Relieving factors: heat  Progression: improved    Social Support  Steps to enter house: yes  Stairs in house: yes   Lives in: multiple-level home  Lives with: spouse and adult children    Employment status: not working  Hand dominance: right  Exercise history: light shoulder exercises, per physician   Life stress: Low       Diagnostic Tests  Abnormal x-ray: X-rays of the right shoulder display maintenance of reverse shoulder arthroplasty alignment  No interval sign of healing of greater tuberosity    Treatments  Previous treatment: physical therapy  Patient Goals  Patient goal: "to be able to have more function"        Objective     Active Range of Motion   Left Shoulder   Flexion: 125 degrees   Extension: 48 degrees   Abduction: 105 degrees   External rotation 0°: 30 degrees   Internal rotation BTB: L5     Right Shoulder   Flexion: 70 degrees   Abduction: 50 degrees   External rotation 0°: 20 degrees     Passive Range of Motion     Right Shoulder   Flexion: 125 degrees   Abduction: 115 degrees   External rotation 45°: 60 degrees     Strength/Myotome Testing     Right Shoulder     Planes of Motion   Flexion: 2   Abduction: 2   External rotation at 0°: 2   Internal rotation at 0°: 2     Additional Strength Details  Left shoulder: able to hold against minimal to moderate resistance in all planes                  Precautions: h/o L reverse TSA, R reverse TSA 3/3/21      Manuals 6/24 6/28   6/4 6/7 6/10 6/14 6/17 6/21   Re-eval                                                    Neuro Re-Ed             PSR  PRN      x20     scap pinches  PRN           scap depression             submax delt isometrics     Ext, flex, abd 3"x15 ea Ext, flex, abd 3"x15 ea Ext, flex, abd 3" x 15 each      Submax IR/ER isometrics     ER/IR 3"x15 ea ER/IR 3"x15 ea ER/IR   3"x15 ea      AAROM- supine flexion w PT assist   On elevated table w cane 2x10    elevated on table w cane 2x10  elevated on table w cane 2x10 elevated on table with cane 20x On elevated table w cane x20 On elevated table w cane x20 On elevated table w cane 2x10    sidelying shoulder abd to 90          2x10  NV 2x10    sidelying shoulder ER         2x10  NV 2x10    AAROM standing abd w cane  2x10 2x10      2x10  2x10     TB Row Red 2x10  Red 2x10            Ther Ex             PROM per protocol     8' 8' 8' 10' 10' 10'   Pendulums              pulleys 5' 5'   5' 5' 5' 5' 5' 5'   Cane chest press- table elevated  2x10 2x10   Cane elevated on table 2x10 Cane elevated on table 2x10 Cane elevated on table 2x10 Cane elevated on table 2x10 Cane elevated on table 2x10 Cane elevated on table 2x10    AAROM Ball roll flexion x30 x20   x30 x30 30x x20  x20  x20    AAROM Elevated Table slide flexion x30 x20 Elevated table slide 3x10  Elevated table slide 3x10  Elevated table slide 3x10 Elevated table slide 2x10 Elevated table slide 2x10 Elevated table slide 2x10   Finger ladder AAROM     2x10 2x10 2x10 2x10  2x10     Standing cane flex- AAROM 2x10 2x10       w PT assist 2x10  w PT assist 2x10     Bicep curl  1# 2x10        1# x10; 2# x10 NV   Tricep kickback  1# 2x10        0# x10; 1# x10  NV   TRX walk through 2x10 2x10         2x10    Ther Activity                                       Gait Training                                       Modalities             CP R GH  10'   10' 10' 10' 10' 10' 10'

## 2021-07-01 ENCOUNTER — OFFICE VISIT (OUTPATIENT)
Dept: PHYSICAL THERAPY | Facility: CLINIC | Age: 61
End: 2021-07-01
Payer: COMMERCIAL

## 2021-07-01 DIAGNOSIS — Z96.611 STATUS POST REVERSE TOTAL REPLACEMENT OF RIGHT SHOULDER: Primary | ICD-10-CM

## 2021-07-01 PROCEDURE — 97110 THERAPEUTIC EXERCISES: CPT | Performed by: PHYSICAL THERAPIST

## 2021-07-01 PROCEDURE — 97112 NEUROMUSCULAR REEDUCATION: CPT | Performed by: PHYSICAL THERAPIST

## 2021-07-01 NOTE — PROGRESS NOTES
Daily Note     Today's date: 2021  Patient name: Sourav Melgar  : 1960  MRN: [id number]  Referring provider: Tyson Richards DO  Dx:   Encounter Diagnosis     ICD-10-CM    1  Status post reverse total replacement of right shoulder  Z96 611                   Subjective: Patient states she took her blood pressure the other day and it was "a little high"  She denies any symptoms of high blood pressure  States she is noticing improvements with her ROM  Objective: See treatment diary below    BP: 140/98      Assessment: Tolerated treatment well  Patient requires little assist for standing shoulder flexion, but she still is unable to lift on her own against gravity  Patient would benefit from continued PT      Plan: Continue per plan of care  Due to elevated blood pressure, will not perform BFR  Patient instructed to call her PCP regarding elevated blood pressure        Precautions: h/o L reverse TSA, R reverse TSA 3/3/21      Manuals 6/24 6/28 7/1  6/4 6/7 6/10 6/14 6/17 6/21   Re-eval                                                    Neuro Re-Ed             PSR  PRN      x20     scap pinches  PRN           scap depression             submax delt isometrics     Ext, flex, abd 3"x15 ea Ext, flex, abd 3"x15 ea Ext, flex, abd 3" x 15 each      Submax IR/ER isometrics     ER/IR 3"x15 ea ER/IR 3"x15 ea ER/IR   3"x15 ea      AAROM- supine flexion w PT assist   On elevated table w cane 2x10  Standing w PT x10, w cane x 10  elevated on table w cane 2x10  elevated on table w cane 2x10 elevated on table with cane 20x On elevated table w cane x20 On elevated table w cane x20 On elevated table w cane 2x10    sidelying shoulder abd to 90          2x10  NV 2x10    sidelying shoulder ER         2x10  NV 2x10    AAROM standing abd w cane  2x10 2x10 w PT x 10; w cane x 10      2x10  2x10     TB Row Red 2x10  Red 2x10  Red 2x10           TB Ext   yel 2x10           Ther Ex             PROM per protocol 8' 8' 8' 10' 10' 10'   Pendulums              pulleys 5' 5' 5'  5' 5' 5' 5' 5' 5'   Cane chest press- table elevated  2x10 2x10   Cane elevated on table 2x10 Cane elevated on table 2x10 Cane elevated on table 2x10 Cane elevated on table 2x10 Cane elevated on table 2x10 Cane elevated on table 2x10    AAROM Ball roll flexion x30 x20 x20   x30 x30 30x x20  x20  x20    AAROM Elevated Table slide flexion x30 x20  x20   Elevated table slide 3x10  Elevated table slide 3x10  Elevated table slide 3x10 Elevated table slide 2x10 Elevated table slide 2x10 Elevated table slide 2x10   Finger ladder AAROM   x20   2x10 2x10 2x10 2x10  2x10     Standing cane flex- AAROM 2x10 2x10  See above      w PT assist 2x10  w PT assist 2x10     Bicep curl  1# 2x10  1# 2x10       1# x10; 2# x10 NV   Tricep kickback  1# 2x10  1# 2x10       0# x10; 1# x10  NV   TRX walk through 2x10 2x10  2x10        2x10    Ther Activity                                       Gait Training                                       Modalities             CP R GH  10' 10'   10' 10' 10' 10' 10' 10'

## 2021-07-07 ENCOUNTER — OFFICE VISIT (OUTPATIENT)
Dept: PHYSICAL THERAPY | Facility: CLINIC | Age: 61
End: 2021-07-07
Payer: COMMERCIAL

## 2021-07-07 DIAGNOSIS — Z96.611 STATUS POST REVERSE TOTAL REPLACEMENT OF RIGHT SHOULDER: Primary | ICD-10-CM

## 2021-07-07 PROCEDURE — 97112 NEUROMUSCULAR REEDUCATION: CPT

## 2021-07-07 PROCEDURE — 97110 THERAPEUTIC EXERCISES: CPT

## 2021-07-07 NOTE — PROGRESS NOTES
Daily Note     Today's date: 2021  Patient name: Bin Camara  : 1960  MRN: [id number]  Referring provider: Arvind Clark DO  Dx:   Encounter Diagnosis     ICD-10-CM    1  Status post reverse total replacement of right shoulder  Z96 611                   Subjective: Pt offers no new complaints upon arrival to session  Objective: See treatment diary below      Assessment: UT compensation remains for flexion/abduction  Lacking scapular upward elevation during exercise and does achieve increased ROM with therapist assist   Verbal cuing provided in order to facilitate improved scapular depression during exercise  Plan: Continue per plan of care        Precautions: h/o L reverse TSA, R reverse TSA 3/3/21      Manuals 6/24 6/28 7/1 7/7 6/4 6/7 6/10 6/14 6/17 6/21   Re-eval                                                    Neuro Re-Ed             PSR  PRN      x20     scap pinches  PRN           scap depression    Supine dept + ret x20         submax delt isometrics     Ext, flex, abd 3"x15 ea Ext, flex, abd 3"x15 ea Ext, flex, abd 3" x 15 each      Submax IR/ER isometrics     ER/IR 3"x15 ea ER/IR 3"x15 ea ER/IR   3"x15 ea      AAROM- supine flexion w PT assist   On elevated table w cane 2x10  Standing w PT x10, w cane x 10 Standing c PT x10, c cane x10 elevated on table w cane 2x10  elevated on table w cane 2x10 elevated on table with cane 20x On elevated table w cane x20 On elevated table w cane x20 On elevated table w cane 2x10    sidelying shoulder abd to 90          2x10  NV 2x10    sidelying shoulder ER         2x10  NV 2x10    AAROM standing abd w cane  2x10 2x10 w PT x 10; w cane x 10  c PT x10 c cane x10    2x10  2x10     TB Row Red 2x10  Red 2x10  Red 2x10  RTB 2x10         TB Ext   yel 2x10  YTB 2x10         Ther Ex             PROM per protocol     8' 8' 8' 10' 10' 10'   Pendulums              pulleys 5' 5' 5' 5' 5' 5' 5' 5' 5' 5'   Cane chest press- table elevated  2x10 2x10   Cane elevated on table 2x10 Cane elevated on table 2x10 Cane elevated on table 2x10 Cane elevated on table 2x10 Cane elevated on table 2x10 Cane elevated on table 2x10    AAROM Ball roll flexion x30 x20 x20  x20 x30 x30 30x x20  x20  x20    AAROM Elevated Table slide flexion x30 x20  x20  x20 Elevated table slide 3x10  Elevated table slide 3x10  Elevated table slide 3x10 Elevated table slide 2x10 Elevated table slide 2x10 Elevated table slide 2x10   Finger ladder AAROM   x20  x20 2x10 2x10 2x10 2x10  2x10     Standing cane flex- AAROM 2x10 2x10  See above      w PT assist 2x10  w PT assist 2x10     Bicep curl  1# 2x10  1# 2x10  1# 2x10     1# x10; 2# x10 NV   Tricep kickback  1# 2x10  1# 2x10  1# 2x10     0# x10; 1# x10  NV   TRX walk through 2x10 2x10  2x10  2x10      2x10    Ther Activity                                       Gait Training                                       Modalities             CP R GH  10' 10'  10' 10' 10' 10' 10' 10' 10'

## 2021-07-09 ENCOUNTER — OFFICE VISIT (OUTPATIENT)
Dept: PHYSICAL THERAPY | Facility: CLINIC | Age: 61
End: 2021-07-09
Payer: COMMERCIAL

## 2021-07-09 DIAGNOSIS — Z96.611 STATUS POST REVERSE TOTAL REPLACEMENT OF RIGHT SHOULDER: Primary | ICD-10-CM

## 2021-07-09 PROCEDURE — 97112 NEUROMUSCULAR REEDUCATION: CPT | Performed by: PHYSICAL THERAPIST

## 2021-07-09 PROCEDURE — 97110 THERAPEUTIC EXERCISES: CPT | Performed by: PHYSICAL THERAPIST

## 2021-07-09 NOTE — PROGRESS NOTES
Daily Note     Today's date: 2021  Patient name: Ramesh Zhang  : 1960  MRN: [id number]  Referring provider: Mignon Huerta DO  Dx:   Encounter Diagnosis     ICD-10-CM    1  Status post reverse total replacement of right shoulder  Z96 611                   Subjective: Pt states improvements with shoulder mobility  Objective: See treatment diary below      Assessment: Patient able to elevate RUE independently this visit, to less than 90 degrees  However, this is improved from previous sessions  Plan: Continue per plan of care        Precautions: h/o L reverse TSA, R reverse TSA 3/3/21      Manuals         Re-eval                                                    Neuro Re-Ed             PSR  PRN           scap pinches  PRN           scap depression    Supine dept + ret x20 NV        submax delt isometrics     Ext 3"x 10         Submax IR/ER isometrics             AAROM- supine flexion w PT assist   On elevated table w cane 2x10  Standing w PT x10, w cane x 10 Standing c PT x10, c cane x10 Standing w PT x10; w cane x 10         sidelying shoulder abd to 90              sidelying shoulder ER              AAROM standing abd w cane  2x10 2x10 w PT x 10; w cane x 10  c PT x10 c cane x10 c PT x10 c cane x10        TB Row Red 2x10  Red 2x10  Red 2x10  RTB 2x10 NV        TB Ext   yel 2x10  YTB 2x10 NV        Ther Ex             PROM per protocol             Pendulums              pulleys 5' 5' 5' 5' 5'        Cane chest press- table elevated  2x10 2x10           AAROM Ball roll flexion x30 x20 x20  x20         AAROM Elevated Table slide flexion x30 x20  x20  x20         Finger ladder AAROM   x20  x20 x20        Standing cane flex- AAROM 2x10 2x10  See above           Bicep curl  1# 2x10  1# 2x10  1# 2x10         Tricep kickback  1# 2x10  1# 2x10  1# 2x10         TRX walk through 2x10 2x10  2x10  2x10 2x10         Ther Activity                                       Gait Training                                       Modalities             CP R GH  10' 10'  10' decline

## 2021-07-12 ENCOUNTER — OFFICE VISIT (OUTPATIENT)
Dept: PHYSICAL THERAPY | Facility: CLINIC | Age: 61
End: 2021-07-12
Payer: COMMERCIAL

## 2021-07-12 DIAGNOSIS — Z96.611 STATUS POST REVERSE TOTAL REPLACEMENT OF RIGHT SHOULDER: Primary | ICD-10-CM

## 2021-07-12 PROCEDURE — 97112 NEUROMUSCULAR REEDUCATION: CPT | Performed by: PHYSICAL THERAPIST

## 2021-07-12 PROCEDURE — 97110 THERAPEUTIC EXERCISES: CPT | Performed by: PHYSICAL THERAPIST

## 2021-07-12 NOTE — PROGRESS NOTES
Daily Note     Today's date: 2021  Patient name: Amanda Velasco  : 1960  MRN: [id number]  Referring provider: Tian Alicea DO  Dx:   Encounter Diagnosis     ICD-10-CM    1  Status post reverse total replacement of right shoulder  Z96 611                   Subjective: Pt states she's feeling well today  Objective: See treatment diary below      Assessment: Patient continues to be able to elevate RUE independently this visit, to less than 90 degrees  She is challenged with progression of exercises to include active inferior glide with RUE elevation  She requires verbal and manual cueing throughout this exercise  No issues during or post session  Plan: Continue per plan of care        Precautions: h/o L reverse TSA, R reverse TSA 3/3/21      Manuals        Re-eval                                                    Neuro Re-Ed             PSR  PRN           scap pinches  PRN           scap depression    Supine dept + ret x20 NV        submax delt isometrics     Ext 3"x 10  HEP today       Submax IR/ER isometrics      HEP today       AAROM- supine flexion w PT assist   On elevated table w cane 2x10  Standing w PT x10, w cane x 10 Standing c PT x10, c cane x10 Standing w PT x10; w cane x 10  Standing w PT x 10; w cane 2x10        sidelying shoulder abd to 90              sidelying shoulder ER              AAROM standing abd w cane  2x10 2x10 w PT x 10; w cane x 10  c PT x10 c cane x10 c PT x10 c cane x10 w PT x 10; w cane 2x10        TB Row Red 2x10  Red 2x10  Red 2x10  RTB 2x10 NV NV       TB Ext   yel 2x10  YTB 2x10 NV NV       Active inf glide w RUE elevation (hi/low table)      x10        Ther Ex             PROM per protocol             Pendulums              pulleys 5' 5' 5' 5' 5' 5'       Cane chest press- table elevated  2x10 2x10           AAROM Ball roll flexion x30 x20 x20  x20  x20       AAROM Elevated Table slide flexion x30 x20  x20  x20  x20 Finger ladder AAROM   x20  x20 x20 x20        Standing cane flex- AAROM 2x10 2x10  See above    See above       Bicep curl  1# 2x10  1# 2x10  1# 2x10  2# 2x10        Tricep kickback  1# 2x10  1# 2x10  1# 2x10  2# 2x10        TRX walk through 2x10 2x10  2x10  2x10 2x10  2x10        Ther Activity                                       Gait Training                                       Modalities             CP R GH  10' 10'  10' decline

## 2021-07-15 ENCOUNTER — OFFICE VISIT (OUTPATIENT)
Dept: PHYSICAL THERAPY | Facility: CLINIC | Age: 61
End: 2021-07-15
Payer: COMMERCIAL

## 2021-07-15 ENCOUNTER — VBI (OUTPATIENT)
Dept: ADMINISTRATIVE | Facility: OTHER | Age: 61
End: 2021-07-15

## 2021-07-15 DIAGNOSIS — Z96.611 STATUS POST REVERSE TOTAL REPLACEMENT OF RIGHT SHOULDER: Primary | ICD-10-CM

## 2021-07-15 PROCEDURE — 97110 THERAPEUTIC EXERCISES: CPT | Performed by: PHYSICAL THERAPIST

## 2021-07-15 PROCEDURE — 97112 NEUROMUSCULAR REEDUCATION: CPT | Performed by: PHYSICAL THERAPIST

## 2021-07-15 NOTE — PROGRESS NOTES
Daily Note     Today's date: 7/15/2021  Patient name: Kirk Shahid  : 1960  MRN: [id number]  Referring provider: Lalitha Blake DO  Dx:   Encounter Diagnosis     ICD-10-CM    1  Status post reverse total replacement of right shoulder  Z96 611                   Subjective: Pt states she's doing some walking and exercises in the pool  No complaints today  Objective: See treatment diary below      Assessment: Strength continues to improve  This visit she was able to perform retroUBE, with improved ease compared to prior to surgery  She will continue to benefit from strengthening progression, as per protocol  Treatment ends with passive stretching  Plan: Continue per plan of care        Precautions: h/o L reverse TSA, R reverse TSA 3/3/21      Manuals 6/24 6/28 7/1 7/7 7/9 7/12 7/15      Re-eval                                                    Neuro Re-Ed             PSR  PRN           scap pinches  PRN           scap depression    Supine dept + ret x20 NV        submax delt isometrics     Ext 3"x 10  HEP today       Submax IR/ER isometrics      HEP today       AAROM- supine flexion w PT assist   On elevated table w cane 2x10  Standing w PT x10, w cane x 10 Standing c PT x10, c cane x10 Standing w PT x10; w cane x 10  Standing w PT x 10; w cane 2x10  Standing cane 3x10       sidelying shoulder abd to 90              sidelying shoulder ER              AAROM standing abd w cane  2x10 2x10 w PT x 10; w cane x 10  c PT x10 c cane x10 c PT x10 c cane x10 w PT x 10; w cane 2x10  Cane 2x10       TB Row Red 2x10  Red 2x10  Red 2x10  RTB 2x10 NV NV Red 2x10       TB Ext   yel 2x10  YTB 2x10 NV NV Red 2x10       Active inf glide w RUE elevation (hi/low table)      x10  2x10       Ther Ex             PROM per protocol       8'      RetroUBE       3'      pulleys 5' 5' 5' 5' 5' 5' 5'      Cane chest press- table elevated  2x10 2x10           AAROM Ball roll flexion x30 x20 x20  x20  x20 AAROM Elevated Table slide flexion x30 x20  x20  x20  x20  x20       Finger ladder AAROM   x20  x20 x20 x20        Standing cane flex- AAROM 2x10 2x10  See above    See above       Bicep curl  1# 2x10  1# 2x10  1# 2x10  2# 2x10  2# 2x10       Tricep kickback  1# 2x10  1# 2x10  1# 2x10  2# 2x10  2# 2x10       TRX walk through 2x10 2x10  2x10  2x10 2x10  2x10  2x10       Ther Activity                                       Gait Training                                       Modalities             CP R GH  10' 10'  10' decline  10'

## 2021-07-15 NOTE — TELEPHONE ENCOUNTER
07/15/21 11:15 AM     See documentation in the VB [employer] SmartForm       [facility]

## 2021-07-19 ENCOUNTER — OFFICE VISIT (OUTPATIENT)
Dept: PHYSICAL THERAPY | Facility: CLINIC | Age: 61
End: 2021-07-19
Payer: COMMERCIAL

## 2021-07-19 DIAGNOSIS — Z96.611 STATUS POST REVERSE TOTAL REPLACEMENT OF RIGHT SHOULDER: Primary | ICD-10-CM

## 2021-07-19 PROCEDURE — 97112 NEUROMUSCULAR REEDUCATION: CPT | Performed by: PHYSICAL THERAPIST

## 2021-07-19 PROCEDURE — 97110 THERAPEUTIC EXERCISES: CPT | Performed by: PHYSICAL THERAPIST

## 2021-07-19 NOTE — PROGRESS NOTES
Daily Note     Today's date: 2021  Patient name: Kuldeep Dumont  : 1960  MRN: [id number]  Referring provider: Adali Simon DO  Dx:   Encounter Diagnosis     ICD-10-CM    1  Status post reverse total replacement of right shoulder  Z96 611                   Subjective: Pt states, "It's coming along!"       Objective: See treatment diary below      Assessment: Patient fatigues throughout session requiring rest breaks between exercises  However, she is able to complete listed exercises  No issues during or post session  Her active ROM continues to improve and she will continue to benefit from strengthening and ROM  Plan: Continue per plan of care        Precautions: h/o L reverse TSA, R reverse TSA 3/3/21      Manuals 6/24 6/28 7/1 7/7 7/9 7/12 7/15 7/19     Re-eval                                                    Neuro Re-Ed             PSR  PRN           scap pinches  PRN           scap depression    Supine dept + ret x20 NV        submax delt isometrics     Ext 3"x 10  HEP today       Submax IR/ER isometrics      HEP today       AAROM- supine flexion w PT assist   On elevated table w cane 2x10  Standing w PT x10, w cane x 10 Standing c PT x10, c cane x10 Standing w PT x10; w cane x 10  Standing w PT x 10; w cane 2x10  Standing cane 3x10  standing cane 2x10      sidelying shoulder abd to 90         2x10      sidelying shoulder ER         2x10      AAROM standing abd w cane  2x10 2x10 w PT x 10; w cane x 10  c PT x10 c cane x10 c PT x10 c cane x10 w PT x 10; w cane 2x10  Cane 2x10  Cane 2x10      TB Row Red 2x10  Red 2x10  Red 2x10  RTB 2x10 NV NV Red 2x10  Red 2x10      TB Ext   yel 2x10  YTB 2x10 NV NV Red 2x10  Red 2x10      Active inf glide w RUE elevation (hi/low table)      x10  2x10  NV     Ther Ex             PROM per protocol       8' 5'     RetroUBE       3' 3'     pulleys 5' 5' 5' 5' 5' 5' 5' 3'     Cane chest press- table elevated  2x10 2x10           AAROM Ball roll flexion x30 x20 x20  x20  x20       AAROM Elevated Table slide flexion x30 x20  x20  x20  x20  x20       Finger ladder AAROM   x20  x20 x20 x20   1# x20      Standing cane flex- AAROM 2x10 2x10  See above    See above       Bicep curl  1# 2x10  1# 2x10  1# 2x10  2# 2x10  2# 2x10  2# 2x10      Tricep kickback  1# 2x10  1# 2x10  1# 2x10  2# 2x10  2# 2x10  2# 2x10      TRX walk through 2x10 2x10  2x10  2x10 2x10  2x10  2x10  2x10     Ther Activity                                       Gait Training                                       Modalities             CP R GH  10' 10'  10' decline  10' 10'

## 2021-07-22 ENCOUNTER — OFFICE VISIT (OUTPATIENT)
Dept: OBGYN CLINIC | Facility: CLINIC | Age: 61
End: 2021-07-22
Payer: COMMERCIAL

## 2021-07-22 ENCOUNTER — APPOINTMENT (OUTPATIENT)
Dept: RADIOLOGY | Facility: CLINIC | Age: 61
End: 2021-07-22
Payer: COMMERCIAL

## 2021-07-22 DIAGNOSIS — Z96.611 STATUS POST REVERSE TOTAL REPLACEMENT OF RIGHT SHOULDER: Primary | ICD-10-CM

## 2021-07-22 DIAGNOSIS — Z96.611 STATUS POST REVERSE TOTAL REPLACEMENT OF RIGHT SHOULDER: ICD-10-CM

## 2021-07-22 PROCEDURE — 99213 OFFICE O/P EST LOW 20 MIN: CPT | Performed by: ORTHOPAEDIC SURGERY

## 2021-07-22 PROCEDURE — 73030 X-RAY EXAM OF SHOULDER: CPT

## 2021-07-22 NOTE — PROGRESS NOTES
Patient Name:  Alvarez King  MRN:  [id number]    [address] [place] [address]     1  Status post reverse total replacement of right shoulder  -     XR shoulder 2+ vw right; Future; Expected date: 07/22/2021         Patient overall doing well status post right reverse shoulder arthroplasty performed on 03/03/2021  X-rays were reviewed with the patient in the office today  Active range of motion is improving  I would like patient to continue formal physical therapy to work on further strengthening and range of motion  I will see her back in 8 weeks for repeat evaluation and new x-rays of her right shoulder  History of the Present Illness   Alvarez King is a 64 y o  female  Status post right reverse shoulder arthroplasty performed on 03/03/2021  Patient denies any pain  She reports that her strength and range of motion are improving  She denies any numbness or tingling  Review of Systems     Review of Systems   Constitutional: Negative for appetite change and unexpected weight change  HENT: Negative for congestion and trouble swallowing  Eyes: Negative for visual disturbance  Respiratory: Negative for cough and shortness of breath  Cardiovascular: Negative for chest pain and palpitations  Gastrointestinal: Negative for nausea and vomiting  Endocrine: Negative for cold intolerance and heat intolerance  Musculoskeletal: Negative for gait problem and myalgias  Skin: Negative for rash  Neurological: Negative for numbness  Physical Exam     There were no vitals taken for this visit  Right Shoulder: Incision is well-healed   There is no erythema or  warmth   Active range of motion of shoulder 90 forward flexion passive, 80 abduction,  External rotation equal to contralateral side, internal rotation to right hip  Full active range of motion of elbow, wrist, and digits present   The patient is neurovascular intact distally      Data Review     I have personally reviewed pertinent films in PACS, and my interpretation follows  X-rays of the right shoulder reviewed in the office today display   Stable prosthesis alignment  Greater tuberosity alignment unchanged with possible interval signs of bony healing appreciated      Social History     Tobacco Use    Smoking status: Current Every Day Smoker     Packs/day: 0 50     Types: Cigarettes    Smokeless tobacco: Never Used   Vaping Use    Vaping Use: Never used   Substance Use Topics    Alcohol use: No    Drug use: No           Procedures    Lynsey Dutta DO

## 2021-07-23 ENCOUNTER — OFFICE VISIT (OUTPATIENT)
Dept: PHYSICAL THERAPY | Facility: CLINIC | Age: 61
End: 2021-07-23
Payer: COMMERCIAL

## 2021-07-23 DIAGNOSIS — Z96.611 STATUS POST REVERSE TOTAL REPLACEMENT OF RIGHT SHOULDER: Primary | ICD-10-CM

## 2021-07-23 PROCEDURE — 97110 THERAPEUTIC EXERCISES: CPT | Performed by: PHYSICAL THERAPIST

## 2021-07-23 PROCEDURE — 97112 NEUROMUSCULAR REEDUCATION: CPT | Performed by: PHYSICAL THERAPIST

## 2021-07-23 NOTE — PROGRESS NOTES
Daily Note     Today's date: 2021  Patient name: Kemal Schultz  : 1960  MRN: [id number]  Referring provider: Guanaco Doll DO  Dx:   Encounter Diagnosis     ICD-10-CM    1  Status post reverse total replacement of right shoulder  Z96 611                   Subjective: Pt states she saw the physician yesterday who was pleased with her progress  She states her ROM and strength are improving  Objective: See treatment diary below      Assessment: Patient continues to exhibit improved active ROM and is able to progress strengthening exercises, as well  She will continue to benefit from skilled PT to maximize functional mobility, especially active elevation of right shoulder  Patient has no complaints of pain or otherwise post session  Plan: Continue per plan of care        Precautions: h/o L reverse TSA, R reverse TSA 3/3/21      Manuals 6/24 6/28 7/1 7/7 7/9 7/12 7/15 7/19 7/23    Re-eval                                                    Neuro Re-Ed             PSR  PRN           scap pinches  PRN           scap depression    Supine dept + ret x20 NV        submax delt isometrics     Ext 3"x 10  HEP today       Submax IR/ER isometrics      HEP today       AAROM- supine flexion w PT assist   On elevated table w cane 2x10  Standing w PT x10, w cane x 10 Standing c PT x10, c cane x10 Standing w PT x10; w cane x 10  Standing w PT x 10; w cane 2x10  Standing cane 3x10  standing cane 2x10  Standing cane 2x10     sidelying shoulder abd to 90         2x10  2x10     sidelying shoulder ER         2x10  2x10     AAROM standing abd w cane  2x10 2x10 w PT x 10; w cane x 10  c PT x10 c cane x10 c PT x10 c cane x10 w PT x 10; w cane 2x10  Cane 2x10  Cane 2x10  Cane 2x10 ea     TB Row Red 2x10  Red 2x10  Red 2x10  RTB 2x10 NV NV Red 2x10  Red 2x10  Red 2x10     TB Ext   yel 2x10  YTB 2x10 NV NV Red 2x10  Red 2x10  Red 2x10     Active inf glide w RUE elevation (hi/low table)      x10  2x10  NV 2x10     Ther Ex             PROM per protocol       8' 5' 5'    RetroUBE       3' 3' 5'    pulleys 5' 5' 5' 5' 5' 5' 5' 3'     Cane chest press- table elevated  2x10 2x10           AAROM Ball roll flexion x30 x20 x20  x20  x20       AAROM Elevated Table slide flexion x30 x20  x20  x20  x20  x20       Finger ladder AAROM   x20  x20 x20 x20   1# x20  x20    Standing tricep pushdown         Red 2x10     Bicep curl  1# 2x10  1# 2x10  1# 2x10  2# 2x10  2# 2x10  2# 2x10  2# 2x10     Tricep kickback  1# 2x10  1# 2x10  1# 2x10  2# 2x10  2# 2x10  2# 2x10  2# 2x10     TRX walk through 2x10 2x10  2x10  2x10 2x10  2x10  2x10  2x10 2x10     Ther Activity                                       Gait Training                                       Modalities             CP R GH  10' 10'  10' decline  10' 10' 10'

## 2021-07-26 ENCOUNTER — EVALUATION (OUTPATIENT)
Dept: PHYSICAL THERAPY | Facility: CLINIC | Age: 61
End: 2021-07-26
Payer: COMMERCIAL

## 2021-07-26 DIAGNOSIS — Z96.611 STATUS POST REVERSE TOTAL REPLACEMENT OF RIGHT SHOULDER: Primary | ICD-10-CM

## 2021-07-26 PROCEDURE — 97110 THERAPEUTIC EXERCISES: CPT | Performed by: PHYSICAL THERAPIST

## 2021-07-26 PROCEDURE — 97112 NEUROMUSCULAR REEDUCATION: CPT | Performed by: PHYSICAL THERAPIST

## 2021-07-26 NOTE — PROGRESS NOTES
PT Re-Evaluation     Today's date: 2021  Patient name: Kemal Schultz  : 1960  MRN: [id number]  Referring provider: Guanaco Doll DO  Dx:   Encounter Diagnosis     ICD-10-CM    1  Status post reverse total replacement of right shoulder  Z96 611                   Assessment  Assessment details: Patient is a 65 y/o female s/p R reverse TSA on 3/3/21  Since starting therapy, she presents with improved shoulder PROM, AROM, and AAROM  Her exercise tolerance has improved  Most recently since last re-evaluation, she is able to perform more active ROM against gravity  She continues with decreased functional mobility due to increased pain, decreased shoulder strength, decreased shoulder ROM associated with aforementioned surgical procedure  She was last seen by surgeon on 21 and it was recommended that she continue with skilled PT Patient will continue to benefit from skilled physical therapy to address impairment and improve functional mobility  PT needed to allow for return to maximal function and improve quality of life  Patient remains motivated for therapy  Impairments: abnormal or restricted ROM, activity intolerance, impaired physical strength, lacks appropriate home exercise program and pain with function  Understanding of Dx/Px/POC: good   Prognosis: good    Goals  STG within 4 weeks:   1  Patient to be independent in HEP  MET  2  Reduce pain by 50% to improve quality of life  MET  3  Improve PROM to listed limits on protocol  MET  LTG within 12 weeks:   1  Patient to be independent in ADLs/IADLs without difficulty  IN PROGRESS  2  Patient to achieve functional elevation of shoulder  IN PROGRESS  3  Patient to be able to lift household items   IN PROGRESS    Plan  Plan details: Per Protocol  Patient would benefit from: skilled physical therapy and PT eval  Planned modality interventions: cryotherapy, hydrotherapy and unattended electrical stimulation  Planned therapy interventions: therapeutic training, therapeutic exercise, therapeutic activities, stretching, strengthening, postural training, patient education, neuromuscular re-education, manual therapy, joint mobilization, IADL retraining, activity modification, ADL retraining, ADL training, body mechanics training, flexibility, functional ROM exercises, gait training, graded activity, graded exercise, graded motor and home exercise program  Frequency: 2x week  Duration in weeks: 6  Plan of Care beginning date: 2021  Plan of Care expiration date: 2021  Treatment plan discussed with: patient        Subjective Evaluation    History of Present Illness  Date of surgery: 3/3/2021  Mechanism of injury: Patient is a 63 y/o female s/p R reverse TSA on 3/3/21  Patient had fallen last year and suffered a humeral fracture  She did not regain full AROM and was ultimately scheduled for reverse TSA  Since starting therapy, she reports less shoulder pain and improved ROM  She is recently starting to note improved shoulder AROM above waist height  She states all her normal activities are getting a little easier  "I can get a cup out of the cabinet now, which I couldn't do a month ago " She also states she's able to lie on her right side without pain  Pain  Current pain ratin  At best pain ratin  At worst pain ratin  Location: posterior right shoulder   Quality: dull ache  Relieving factors: ice  Progression: improved    Social Support  Steps to enter house: yes  Stairs in house: yes   Lives in: multiple-level home  Lives with: spouse and adult children    Employment status: not working  Hand dominance: right  Exercise history: light shoulder exercises, per physician   Life stress: Low       Diagnostic Tests  Abnormal x-ray: X-rays of the right shoulder display maintenance of reverse shoulder arthroplasty alignment  No interval sign of healing of greater tuberosity    Treatments  Previous treatment: physical therapy  Patient Goals  Patient goal: "to be able to have more function"        Objective     Active Range of Motion   Left Shoulder   Flexion: 125 degrees   Extension: 48 degrees   Abduction: 105 degrees   External rotation 0°: 30 degrees   Internal rotation BTB: L5     Right Shoulder   Flexion: 80 degrees   Abduction: 60 degrees   External rotation 0°: 45 degrees     Passive Range of Motion     Right Shoulder   Flexion: 135 degrees   Abduction: 115 degrees   External rotation 45°: 60 degrees     Strength/Myotome Testing     Right Shoulder     Planes of Motion   Flexion: 2+   Abduction: 2+   External rotation at 0°: 2+   Internal rotation at 0°: 2+     Additional Strength Details  Left shoulder: able to hold against minimal to moderate resistance in all planes                  Precautions: h/o L reverse TSA, R reverse TSA 3/3/21      Manuals 6/24 6/28 7/1 7/7 7/9 7/12 7/15 7/19 7/23 7/26   Re-eval          KS                                          Neuro Re-Ed             Alt iso- IR/ER at 45*           x20 sec   Rhythmic stab at 90 deg flex          x20 sec   PSR  PRN           scap pinches  PRN           scap depression    Supine dept + ret x20 NV        submax delt isometrics     Ext 3"x 10  HEP today       Submax IR/ER isometrics      HEP today       AAROM- supine flexion w PT assist   On elevated table w cane 2x10  Standing w PT x10, w cane x 10 Standing c PT x10, c cane x10 Standing w PT x10; w cane x 10  Standing w PT x 10; w cane 2x10  Standing cane 3x10  standing cane 2x10  Standing cane 2x10  1# standing cane 2x10    sidelying shoulder abd to 90         2x10  2x10     sidelying shoulder ER         2x10  2x10     AAROM standing abd w cane  2x10 2x10 w PT x 10; w cane x 10  c PT x10 c cane x10 c PT x10 c cane x10 w PT x 10; w cane 2x10  Cane 2x10  Cane 2x10  Cane 2x10 ea  1# cane 2x10 ea    TB Row Red 2x10  Red 2x10  Red 2x10  RTB 2x10 NV NV Red 2x10  Red 2x10  Red 2x10  Red 2x10    TB Ext   yel 2x10  YTB 2x10 NV NV Red 2x10  Red 2x10  Red 2x10  Red 2x10    Active inf glide w RUE elevation (hi/low table)      x10  2x10  NV 2x10     Ther Ex             PROM per protocol       8' 5' 5' 8'   RetroUBE       3' 3' 5' 6 5'   pulleys 5' 5' 5' 5' 5' 5' 5' 3'     Cane chest press- table elevated  2x10 2x10           AAROM Ball roll flexion x30 x20 x20  x20  x20       AAROM Elevated Table slide flexion x30 x20  x20  x20  x20  x20       Finger ladder AAROM   x20  x20 x20 x20   1# x20  x20 1# 2x10    Standing tricep pushdown         Red 2x10  Red 2x10    Bicep curl  1# 2x10  1# 2x10  1# 2x10  2# 2x10  2# 2x10  2# 2x10  2# 2x10  2# 2x10    Tricep kickback  1# 2x10  1# 2x10  1# 2x10  2# 2x10  2# 2x10  2# 2x10  2# 2x10  2# 2x10    TRX walk through 2x10 2x10  2x10  2x10 2x10  2x10  2x10  2x10 2x10  2x10    Ther Activity                                       Gait Training                                       Modalities             CP R GH  10' 10'  10' decline  10' 10' 10' 10'

## 2021-07-29 ENCOUNTER — OFFICE VISIT (OUTPATIENT)
Dept: PHYSICAL THERAPY | Facility: CLINIC | Age: 61
End: 2021-07-29
Payer: COMMERCIAL

## 2021-07-29 DIAGNOSIS — Z96.611 STATUS POST REVERSE TOTAL REPLACEMENT OF RIGHT SHOULDER: Primary | ICD-10-CM

## 2021-07-29 PROCEDURE — 97112 NEUROMUSCULAR REEDUCATION: CPT | Performed by: PHYSICAL THERAPIST

## 2021-07-29 PROCEDURE — 97110 THERAPEUTIC EXERCISES: CPT | Performed by: PHYSICAL THERAPIST

## 2021-07-29 NOTE — PROGRESS NOTES
Daily Note     Today's date: 2021  Patient name: Kemal Schultz  : 1960  MRN: [id number]  Referring provider: Guanaco Doll DO  Dx:   Encounter Diagnosis     ICD-10-CM    1  Status post reverse total replacement of right shoulder  Z96 611                   Subjective: Patient has nothing new to report  Objective: See treatment diary below      Assessment: Tolerated treatment well  She continues to be able to elevate RUE with improved height   She exhibits overall improved strength during session from week to week  Patient would benefit from continued PT      Plan: Continue per plan of care        Precautions: h/o L reverse TSA, R reverse TSA 3/3/21      Manuals 7/29   7/7 7/9 7/12 7/15 7/19 7/23 7/26   Re-eval          KS                                          Neuro Re-Ed             Alt iso- IR/ER at 45*  x20 sec         x20 sec   Rhythmic stab at 90 deg flex x20 sec         x20 sec   PSR             scap pinches             scap depression    Supine dept + ret x20 NV        submax delt isometrics     Ext 3"x 10  HEP today       Submax IR/ER isometrics      HEP today       Standing cane flexion  1# 2x10   Standing c PT x10, c cane x10 Standing w PT x10; w cane x 10  Standing w PT x 10; w cane 2x10  Standing cane 3x10  standing cane 2x10  Standing cane 2x10  1# standing cane 2x10    sidelying shoulder abd to 90         2x10  2x10     sidelying shoulder ER         2x10  2x10     Standing cane abduction 1# 2x10    c PT x10 c cane x10 c PT x10 c cane x10 w PT x 10; w cane 2x10  Cane 2x10  Cane 2x10  Cane 2x10 ea  1# cane 2x10 ea    TB Row Red 2x10    RTB 2x10 NV NV Red 2x10  Red 2x10  Red 2x10  Red 2x10    TB Ext Red 2x10    YTB 2x10 NV NV Red 2x10  Red 2x10  Red 2x10  Red 2x10    Active inf glide w RUE elevation (hi/low table)      x10  2x10  NV 2x10     Ther Ex             PROM per protocol 5'      8' 5' 5' 8'   RetroUBE Alt fwd/bwd x6'      3' 3' 5' 6 5'   Cane chest press 2# 2x10 AAROM Elevated Table slide flexion    x20  x20  x20       Finger ladder AAROM x20   x20 x20 x20   1# x20  x20 1# 2x10    Standing tricep pushdown         Red 2x10  Red 2x10    Bicep curl 2# 2x10   1# 2x10  2# 2x10  2# 2x10  2# 2x10  2# 2x10  2# 2x10    Tricep kickback 2# 2x10   1# 2x10  2# 2x10  2# 2x10  2# 2x10  2# 2x10  2# 2x10    TRX walk through 2x10    2x10 2x10  2x10  2x10  2x10 2x10  2x10    Ther Activity                                       Gait Training                                       Modalities             CP R GH 10    10' decline  10' 10' 10' 10'

## 2021-07-30 NOTE — PROGRESS NOTES
[facility] - Hematology & Medical Oncology  Outpatient Visit Encounter Note      Kemal Schultz 64 y o  female 1960 [id number] Date:  8/3/2021    HEMATOLOGICAL HISTORY        Clotting History Denies   Bleeding History Denies   Cancer History Denies   Family Cancer History Father with lymphoma and sister with ovarian cancer   H/O Blood/Plt Transfusion Denies   Tobacco Use 15 pack year history, continues today   Alcohol Use Denies   Occupation Full time mother of 4 and 4 grandchildren     1  Iron deficiency anemia secondary to inadequate dietary iron intake (Dx: when she was in high school) - Rx: Ferrous gluconate 240 mg tablet    SUBJECTIVE      Kemal Schultz is a 64 y o  with morbid obesity here for follow-up with me today for iron deficiency anemia  She tolerated IV Venofer well on 5/4, 5/11, 5/18, 5/25, and 6/1  However, she did not receive updated blood work to confirm resolution of iron deficiency anemia      Her CBC showed microcytic anemia with thrombocytosis:   9/12/2018  2/15/2021  3/4/2021  3/15/2021    WBC 9 09 8 98 13 86 (H) 8 74   Red Blood Cell Count 5 34 (H) 5 33 (H) 4 21 4 30   Hemoglobin 10 5 (L) 9 8 (L) 7 9 (L) 8 0 (L)   HCT 38 8 37 0 29 4 (L) 30 6 (L)   MCV 73 (L) 69 (L) 70 (L) 71 (L)   MCH 19 7 (L) 18 4 (L) 18 8 (L) 18 6 (L)   MCHC 27 1 (L) 26 5 (L) 26 9 (L) 26 1 (L)   RDW 20 9 (H) 21 2 (H) 20 7 (H) 21 5 (H)   Platelet Count 606 (H) 467 (H) 405 (H) 569 (H)     Her differential showed increased atypical lymphocytes %:   3/15/2021    Segs Relative 66   Abs Neutrophils 5 77   Lymphocytes % 17   Monocytes 8   Eosinophils 3   Basophils Relative 0   Atypical Lymphocytes % 6 (H)   Absolute Eosinophils 0 26   Basophils Absolute 0 00   RBC Morphology Present   Polychromasia Present   Target Cells Present   Anisocytosis Present   Hypochromia Present   Microcytes Present   Poikilocytes Present   Giant PLTs Present     Her iron panel demonstrated iron deficiency:   2/15/2021  3/15/2021    Iron 20 (L) 18 (L)   Ferritin 20 37   Iron Saturation 5 5   TIBC 370 366     She has a history of R humeral head fracture in August 2020 after a fall  This Visit  She is here with her daughter Sukh Lazaro today  She voices no acute complaints  She tells me that she feels like she has more energy  She continues to deny coffee ground stools, tarry stools, bright red blood per rectum, hematuria, or menorrhagia  She has not had a colonoscopy yet and continues to fear what might be found on GI-work-up  She continues to deny fatigue, lightheadedness, dizziness, shortness of breath, EDWARDS, palpitations, or chest pain  She denies fevers, chills, unintentional weight loss, abdominal pain/distension, nausea, vomiting, constipation, diarrhea, petechiae/purpura, unexplained ecchymosis, or LE swelling  I have reviewed the relevant past medical, surgical, social and family history  I have also reviewed allergies and medications for this patient  Review of Systems  Review of Systems   All other systems reviewed and are negative  OBJECTIVE     Physical Exam  Vitals:    08/03/21 0855   BP: 158/100   BP Location: Right arm   Patient Position: Sitting   Pulse: 98   Resp: 14   Temp: (!) 96 6 °F (35 9 °C)   TempSrc: Tympanic   SpO2: 98%   Weight: 116 kg (256 lb)   Height: 5' 1" (1 549 m)       Physical Exam  Vitals reviewed  Constitutional:       General: She is not in acute distress  Appearance: Normal appearance  She is obese  She is not ill-appearing, toxic-appearing or diaphoretic  HENT:      Head: Normocephalic and atraumatic  Eyes:      General: No scleral icterus  Extraocular Movements: Extraocular movements intact  Conjunctiva/sclera: Conjunctivae normal       Pupils: Pupils are equal, round, and reactive to light  Comments: No conjunctival pallor  Cardiovascular:      Rate and Rhythm: Normal rate and regular rhythm  Pulses: Normal pulses  Heart sounds: Normal heart sounds  No murmur heard  No friction rub  No gallop  Pulmonary:      Effort: Pulmonary effort is normal  No respiratory distress  Breath sounds: Normal breath sounds  No wheezing or rales  Abdominal:      General: Bowel sounds are normal  There is no distension  Palpations: Abdomen is soft  There is no mass  Tenderness: There is no abdominal tenderness  There is no guarding or rebound  Musculoskeletal:         General: No swelling or tenderness  Normal range of motion  Cervical back: Normal range of motion and neck supple  No rigidity  Right lower leg: No edema  Left lower leg: No edema  Lymphadenopathy:      Cervical: No cervical adenopathy  Skin:     General: Skin is warm and dry  Coloration: Skin is not jaundiced or pale  Findings: No bruising, erythema or rash  Neurological:      General: No focal deficit present  Mental Status: She is alert  Mental status is at baseline  Psychiatric:         Mood and Affect: Mood normal          Behavior: Behavior normal           Imaging  Relevant imaging reviewed in chart    Labs  Relevant labs reviewed in chart     ASSESSMENT & PLAN      Diagnosis ICD-10-CM Associated Orders   1  Iron deficiency anemia, unspecified iron deficiency anemia type  D50 9 Occult Blood, Fecal Immunochemical     CBC and Platelet     Iron Panel (Includes Ferritin, Iron Sat%, Iron, and TIBC)     61-year-old female seen in follow-up for iron deficiency anemia, with concern for occult GI bleed  Discussion:  She tolerated IV Venofer 300mg x 5 doses well  However, she has not received updated blood work to confirm resolution of her iron deficiency anemia  I also reviewed her hesitancy to undergo GI work-up  I explained that our concern for unexplained postmenopausal iron deficiency anemia is occult GI bleed secondary to colon cancer   After reviewing this, she agreed to receive fecal occult test, which she will pick up after the visit today  I also strongly encouraged her to follow-up with her PCP to receive a cologuard test if she continues to be hesitant to receive a colonoscopy  I answered all her questions and she voiced understanding  The underlying etiology of Shad Pate's iron deficiency anemia is still unknown  However, I defer to her PCP to investigate the underlying cause and coordinate the appropriate management if occult GI bleed is suspected  I explained that IV iron supplementation will only temporarily alleviate her iron deficiency anemia unless the underlying etiology is managed  Thus, I strongly encouraged her to follow up with her PCP and make an appointment with gastroenterology  Plan/Labs:  · Fecal occult test  · Repeat iron panel and CBC drawn after visit today  · Repeat iron panel and CBC in 3 months, to monitor for concern of occult GI bleeding    Follow Up   3 months      All questions were answered to the patient's satisfaction during this encounter  They appreciated and thanked me for spending time with them  The patient knows the contact information for our office and know to reach out for any relevant concerns related to this encounter  For all other listed problems and medical diagnosis in his chart - they are managed by PCP and/or other specialists, which patient acknowledges        Joseph Schaefer PA-C  Hematology & Medical Oncology

## 2021-08-03 ENCOUNTER — APPOINTMENT (OUTPATIENT)
Dept: LAB | Facility: HOSPITAL | Age: 61
End: 2021-08-03
Payer: COMMERCIAL

## 2021-08-03 ENCOUNTER — OFFICE VISIT (OUTPATIENT)
Dept: HEMATOLOGY ONCOLOGY | Facility: CLINIC | Age: 61
End: 2021-08-03
Payer: COMMERCIAL

## 2021-08-03 VITALS
OXYGEN SATURATION: 98 % | HEIGHT: 61 IN | BODY MASS INDEX: 48.33 KG/M2 | WEIGHT: 256 LBS | HEART RATE: 98 BPM | RESPIRATION RATE: 14 BRPM | DIASTOLIC BLOOD PRESSURE: 100 MMHG | SYSTOLIC BLOOD PRESSURE: 158 MMHG | TEMPERATURE: 96.6 F

## 2021-08-03 DIAGNOSIS — D50.9 IRON DEFICIENCY ANEMIA, UNSPECIFIED IRON DEFICIENCY ANEMIA TYPE: Primary | ICD-10-CM

## 2021-08-03 DIAGNOSIS — D50.9 IRON DEFICIENCY ANEMIA, UNSPECIFIED IRON DEFICIENCY ANEMIA TYPE: ICD-10-CM

## 2021-08-03 LAB
ERYTHROCYTE [DISTWIDTH] IN BLOOD BY AUTOMATED COUNT: 22.8 % (ref 11.6–15.1)
FERRITIN SERPL-MCNC: 288 NG/ML (ref 8–388)
HCT VFR BLD AUTO: 44.6 % (ref 34.8–46.1)
HGB BLD-MCNC: 12.8 G/DL (ref 11.5–15.4)
IRON SATN MFR SERPL: 16 %
IRON SERPL-MCNC: 47 UG/DL (ref 50–170)
MCH RBC QN AUTO: 24 PG (ref 26.8–34.3)
MCHC RBC AUTO-ENTMCNC: 28.7 G/DL (ref 31.4–37.4)
MCV RBC AUTO: 84 FL (ref 82–98)
PLATELET # BLD AUTO: 320 THOUSANDS/UL (ref 149–390)
PMV BLD AUTO: 9.5 FL (ref 8.9–12.7)
RBC # BLD AUTO: 5.33 MILLION/UL (ref 3.81–5.12)
TIBC SERPL-MCNC: 300 UG/DL (ref 250–450)
WBC # BLD AUTO: 7.69 THOUSAND/UL (ref 4.31–10.16)

## 2021-08-03 PROCEDURE — 83540 ASSAY OF IRON: CPT

## 2021-08-03 PROCEDURE — 82728 ASSAY OF FERRITIN: CPT

## 2021-08-03 PROCEDURE — 36415 COLL VENOUS BLD VENIPUNCTURE: CPT

## 2021-08-03 PROCEDURE — 4004F PT TOBACCO SCREEN RCVD TLK: CPT | Performed by: STUDENT IN AN ORGANIZED HEALTH CARE EDUCATION/TRAINING PROGRAM

## 2021-08-03 PROCEDURE — 83550 IRON BINDING TEST: CPT

## 2021-08-03 PROCEDURE — 3008F BODY MASS INDEX DOCD: CPT | Performed by: STUDENT IN AN ORGANIZED HEALTH CARE EDUCATION/TRAINING PROGRAM

## 2021-08-03 PROCEDURE — 85027 COMPLETE CBC AUTOMATED: CPT

## 2021-08-03 PROCEDURE — 99214 OFFICE O/P EST MOD 30 MIN: CPT | Performed by: STUDENT IN AN ORGANIZED HEALTH CARE EDUCATION/TRAINING PROGRAM

## 2021-08-04 ENCOUNTER — TELEPHONE (OUTPATIENT)
Dept: SURGICAL ONCOLOGY | Facility: CLINIC | Age: 61
End: 2021-08-04

## 2021-08-04 ENCOUNTER — OFFICE VISIT (OUTPATIENT)
Dept: PHYSICAL THERAPY | Facility: CLINIC | Age: 61
End: 2021-08-04
Payer: COMMERCIAL

## 2021-08-04 DIAGNOSIS — Z96.611 STATUS POST REVERSE TOTAL REPLACEMENT OF RIGHT SHOULDER: Primary | ICD-10-CM

## 2021-08-04 PROCEDURE — 97110 THERAPEUTIC EXERCISES: CPT | Performed by: PHYSICAL THERAPIST

## 2021-08-04 PROCEDURE — 97112 NEUROMUSCULAR REEDUCATION: CPT | Performed by: PHYSICAL THERAPIST

## 2021-08-04 NOTE — TELEPHONE ENCOUNTER
Telephone call documentation:  I called patient to discuss her iron panel results with her  Though her iron panel showed improvement of her iron deficiency, her iron panel continued to show low iron saturation and serum iron  I explained that because her iron panel continues to show iron deficiency despite IV iron replacement, I am concerned for occult GI blood loss  Thus, I strongly encouraged her to complete her fecal occult blood test and cologuard as soon as possible  I answered all her questions and she voiced understanding

## 2021-08-04 NOTE — PROGRESS NOTES
Daily Note     Today's date: 2021  Patient name: Kuldeep Dumont  : 1960  MRN: [id number]  Referring provider: Adali Simon DO  Dx:   Encounter Diagnosis     ICD-10-CM    1  Status post reverse total replacement of right shoulder  Z96 611                   Subjective:  Patient has no complaints regarding her shoulder  Objective: See treatment diary below      Assessment: Tolerated treatment well  She continues to be able to elevate RUE with improved height   This visit, in particular, she exhibits significant improvement with slides on the elevated plinth  No complaints post session  Patient would benefit from continued PT      Plan: Continue per plan of care        Precautions: h/o L reverse TSA, R reverse TSA 3/3/21      Manuals 7/29 8/4  7/7 7/9 7/12 7/15 7/19 7/23 7/26   Re-eval          KS                                          Neuro Re-Ed             Alt iso- IR/ER at 45*  x20 sec x20 sec        x20 sec   Rhythmic stab at 90 deg flex x20 sec x20 sec        x20 sec   PSR             scap pinches             scap depression    Supine dept + ret x20 NV        submax delt isometrics     Ext 3"x 10  HEP today       Submax IR/ER isometrics      HEP today       Standing cane flexion  1# 2x10   Standing c PT x10, c cane x10 Standing w PT x10; w cane x 10  Standing w PT x 10; w cane 2x10  Standing cane 3x10  standing cane 2x10  Standing cane 2x10  1# standing cane 2x10    sidelying shoulder abd to 90         2x10  2x10     sidelying shoulder ER         2x10  2x10     Standing cane abduction 1# 2x10    c PT x10 c cane x10 c PT x10 c cane x10 w PT x 10; w cane 2x10  Cane 2x10  Cane 2x10  Cane 2x10 ea  1# cane 2x10 ea    TB Row Red 2x10  Green 2x10   RTB 2x10 NV NV Red 2x10  Red 2x10  Red 2x10  Red 2x10    TB Ext Red 2x10  Green 2x10   YTB 2x10 NV NV Red 2x10  Red 2x10  Red 2x10  Red 2x10    Active inf glide w RUE elevation (hi/low table)      x10  2x10  NV 2x10     Ther Ex PROM per protocol 5' 5'     8' 5' 5' 8'   RetroUBE Alt fwd/bwd x6' Alt fwd/bwd x6'      3' 3' 5' 6 5'   Cane chest press 2# 2x10             AAROM Elevated Table slide flexion  x20   x20  x20  x20       Finger ladder AAROM x20 2# x20   x20 x20 x20   1# x20  x20 1# 2x10    Standing tricep pushdown         Red 2x10  Red 2x10    Bicep curl 2# 2x10 2# 2x10   1# 2x10  2# 2x10  2# 2x10  2# 2x10  2# 2x10  2# 2x10    Tricep kickback 2# 2x10 2# 2x10   1# 2x10  2# 2x10  2# 2x10  2# 2x10  2# 2x10  2# 2x10    TRX walk through 2x10  2x10   2x10 2x10  2x10  2x10  2x10 2x10  2x10    BOR  2# 2x10            Ther Activity                                       Gait Training                                       Modalities             CP R GH 10    10' decline  10' 10' 10' 10'

## 2021-08-06 ENCOUNTER — OFFICE VISIT (OUTPATIENT)
Dept: PHYSICAL THERAPY | Facility: CLINIC | Age: 61
End: 2021-08-06
Payer: COMMERCIAL

## 2021-08-06 DIAGNOSIS — Z96.611 STATUS POST REVERSE TOTAL REPLACEMENT OF RIGHT SHOULDER: Primary | ICD-10-CM

## 2021-08-06 PROCEDURE — 97110 THERAPEUTIC EXERCISES: CPT | Performed by: PHYSICAL THERAPIST

## 2021-08-06 NOTE — PROGRESS NOTES
Daily Note     Today's date: 2021  Patient name: Srikanth Found  : 1960  MRN: [id number]  Referring provider: Lorena German DO  Dx:   Encounter Diagnosis     ICD-10-CM    1  Status post reverse total replacement of right shoulder  Z96 611                   Subjective:  Patient has no new complaints  Objective: See treatment diary below      Assessment: Tolerated treatment well  She continues to improve with passive and active ROM  Overall improved strength noted  Patient would benefit from continued PT      Plan: Continue per plan of care        Precautions: h/o L reverse TSA, R reverse TSA 3/3/21      Manuals    Re-eval          KS                                          Neuro Re-Ed             Alt iso- IR/ER at 45*  x20 sec x20 sec x20 sec       x20 sec   Rhythmic stab at 90 deg flex x20 sec x20 sec x20 sec       x20 sec   PSR             scap pinches             scap depression             submax delt isometrics             Submax IR/ER isometrics             Standing cane flexion  1# 2x10       standing cane 2x10  Standing cane 2x10  1# standing cane 2x10    sidelying shoulder abd to 90         2x10  2x10     sidelying shoulder ER         2x10  2x10     Standing cane abduction 1# 2x10        Cane 2x10  Cane 2x10 ea  1# cane 2x10 ea    TB Row Red 2x10  Green 2x10  Green 2x10      Red 2x10  Red 2x10  Red 2x10    TB Ext Red 2x10  Green 2x10  Green 2x10      Red 2x10  Red 2x10  Red 2x10    Active inf glide w RUE elevation (hi/low table)        NV 2x10     Ther Ex             PROM per protocol 5' 5' 5'     5' 5' 8'   RetroUBE Alt fwd/bwd x6' Alt fwd/bwd x6'  Alt fwd/bwd x6'      3' 5' 6 5'   Cane chest press 2# 2x10             AAROM Elevated Table slide flexion  x20            Finger ladder AAROM x20 2# x20       1# x20  x20 1# 2x10    Standing tricep pushdown         Red 2x10  Red 2x10    Bicep curl 2# 2x10 2# 2x10  3# 2x10      2# 2x10  2# 2x10  2# 2x10    Tricep kickback 2# 2x10 2# 2x10  3# 2x10      2# 2x10  2# 2x10  2# 2x10    TRX walk through 2x10  2x10  2x10      2x10 2x10  2x10    BOR  2# 2x10  3# 2x10           Ther Activity                                       Gait Training                                       Modalities             CP R GH 10        10' 10' 10'

## 2021-08-10 ENCOUNTER — OFFICE VISIT (OUTPATIENT)
Dept: PHYSICAL THERAPY | Facility: CLINIC | Age: 61
End: 2021-08-10
Payer: COMMERCIAL

## 2021-08-10 DIAGNOSIS — Z96.611 STATUS POST REVERSE TOTAL REPLACEMENT OF RIGHT SHOULDER: Primary | ICD-10-CM

## 2021-08-10 PROCEDURE — 97112 NEUROMUSCULAR REEDUCATION: CPT | Performed by: PHYSICAL THERAPIST

## 2021-08-10 PROCEDURE — 97110 THERAPEUTIC EXERCISES: CPT | Performed by: PHYSICAL THERAPIST

## 2021-08-10 NOTE — PROGRESS NOTES
Daily Note     Today's date: 8/10/2021  Patient name: Obey Harris  : 1960  MRN: [id number]  Referring provider: Devora Barney DO  Dx:   Encounter Diagnosis     ICD-10-CM    1  Status post reverse total replacement of right shoulder  Z96 611                   Subjective:  Patient states she's able to get in and out of the shower now without help  Objective: See treatment diary below      Assessment: Tolerated treatment well  She is able to progress strengthening exercise  No issues during or post session  She will continue to benefit from skilled PT focusing on strength  Plan: Continue per plan of care        Precautions: h/o L reverse TSA, R reverse TSA 3/3/21      Manuals 7/29 8/4 8/6 8/10     7/19 7/23 7/26   Re-eval          KS                                          Neuro Re-Ed             Alt iso- IR/ER at 45*  x20 sec x20 sec x20 sec x20 sec      x20 sec   Rhythmic stab at 90 deg flex x20 sec x20 sec x20 sec x20 sec      x20 sec   PSR             scap pinches             scap depression             submax delt isometrics             Submax IR/ER isometrics             Standing cane flexion  1# 2x10   2# 2x10     standing cane 2x10  Standing cane 2x10  1# standing cane 2x10    sidelying shoulder abd to 90         2x10  2x10     sidelying shoulder ER         2x10  2x10     Standing cane abduction 1# 2x10    2# 2x10     Cane 2x10  Cane 2x10 ea  1# cane 2x10 ea    TB Row Red 2x10  Green 2x10  Green 2x10  Green 2x10     Red 2x10  Red 2x10  Red 2x10    TB Ext Red 2x10  Green 2x10  Green 2x10  Green 2x10     Red 2x10  Red 2x10  Red 2x10    Active inf glide w RUE elevation (hi/low table)        NV 2x10     Ther Ex             PROM per protocol 5' 5' 5' 5'    5' 5' 8'   RetroUBE Alt fwd/bwd x6' Alt fwd/bwd x6'  Alt fwd/bwd x6'  Alt fwd/bwd x6'     3' 5' 6 5'   Cane chest press 2# 2x10    2# 2x10          Supine cane flexion    2# 2x10          Finger ladder AAROM x20 2# x20 1# x20  x20 1# 2x10    Standing tricep pushdown         Red 2x10  Red 2x10    Bicep curl 2# 2x10 2# 2x10  3# 2x10  3# 2x10     2# 2x10  2# 2x10  2# 2x10    Tricep kickback 2# 2x10 2# 2x10  3# 2x10  3# 2x10     2# 2x10  2# 2x10  2# 2x10    TRX walk through 2x10  2x10  2x10  2x10     2x10 2x10  2x10    BOR  2# 2x10  3# 2x10  3# 2x10          Ther Activity                                       Gait Training                                       Modalities             CP R GH 10        10' 10' 10'

## 2021-08-13 ENCOUNTER — OFFICE VISIT (OUTPATIENT)
Dept: PHYSICAL THERAPY | Facility: CLINIC | Age: 61
End: 2021-08-13
Payer: COMMERCIAL

## 2021-08-13 ENCOUNTER — VBI (OUTPATIENT)
Dept: ADMINISTRATIVE | Facility: OTHER | Age: 61
End: 2021-08-13

## 2021-08-13 DIAGNOSIS — Z96.611 STATUS POST REVERSE TOTAL REPLACEMENT OF RIGHT SHOULDER: Primary | ICD-10-CM

## 2021-08-13 PROCEDURE — 97112 NEUROMUSCULAR REEDUCATION: CPT | Performed by: PHYSICAL THERAPIST

## 2021-08-13 PROCEDURE — 97110 THERAPEUTIC EXERCISES: CPT | Performed by: PHYSICAL THERAPIST

## 2021-08-17 ENCOUNTER — OFFICE VISIT (OUTPATIENT)
Dept: PHYSICAL THERAPY | Facility: CLINIC | Age: 61
End: 2021-08-17
Payer: COMMERCIAL

## 2021-08-17 DIAGNOSIS — Z96.611 STATUS POST REVERSE TOTAL REPLACEMENT OF RIGHT SHOULDER: Primary | ICD-10-CM

## 2021-08-17 PROCEDURE — 97110 THERAPEUTIC EXERCISES: CPT | Performed by: PHYSICAL THERAPIST

## 2021-08-17 PROCEDURE — 97112 NEUROMUSCULAR REEDUCATION: CPT | Performed by: PHYSICAL THERAPIST

## 2021-08-17 NOTE — PROGRESS NOTES
Daily Note     Today's date: 2021  Patient name: Lilo Franz  : 1960  MRN: [id number]  Referring provider: Frankie Dennis DO  Dx:   Encounter Diagnosis     ICD-10-CM    1  Status post reverse total replacement of right shoulder  Z96 611                   Subjective:  Patient states, "Everything's getting easier "       Objective: See treatment diary below      Assessment: Tolerated treatment well  Patient continues to exhibit increased ease of movement  No pain during or post session  She will continue to benefit from formal therapy and continuation of HEP  Plan: Continue per plan of care        Precautions: h/o L reverse TSA, R reverse TSA 3/3/21      Manuals 7/29 8/4 8/6 8/10  8/13 8/17  7/19 7/23 7/26   Re-eval          KS                                          Neuro Re-Ed             Alt iso- IR/ER at 45*  x20 sec x20 sec x20 sec x20 sec x20 sec 2x20"     x20 sec   Rhythmic stab at 90 deg flex x20 sec x20 sec x20 sec x20 sec x20 sec  2x20"     x20 sec   PSR             scap pinches             scap depression             submax delt isometrics             Submax IR/ER isometrics             Standing cane flexion  1# 2x10   2# 2x10  2# 2x10  2# 2x10   standing cane 2x10  Standing cane 2x10  1# standing cane 2x10    sidelying shoulder abd to 90      2x10  2x10  2x10  2x10     sidelying shoulder ER      2x10  2x10  2x10  2x10     Standing cane abduction 1# 2x10    2# 2x10  2# 2x10  2# 2x10   Cane 2x10  Cane 2x10 ea  1# cane 2x10 ea    TB Row Red 2x10  Green 2x10  Green 2x10  Green 2x10  Green 2x10  Green 2x10   Red 2x10  Red 2x10  Red 2x10    TB Ext Red 2x10  Green 2x10  Green 2x10  Green 2x10  Green 2x10  Green 2x10   Red 2x10  Red 2x10  Red 2x10    Active inf glide w RUE elevation (hi/low table)        NV 2x10     Ther Ex             PROM per protocol 5' 5' 5' 5' 5' 5'  5' 5' 8'   RetroUBE Alt fwd/bwd x6' Alt fwd/bwd x6'  Alt fwd/bwd x6'  Alt fwd/bwd x6'  Alt fwd/bwd x6'  Alt fwd/bwd x6'   3' 5' 6 5'   Cane chest press 2# 2x10    2# 2x10  2# 2x10  2# 2x10        Supine cane flexion    2# 2x10  2# 2x10  2# 2x10        Finger ladder AAROM x20 2# x20       1# x20  x20 1# 2x10    Standing tricep pushdown         Red 2x10  Red 2x10    Bicep curl 2# 2x10 2# 2x10  3# 2x10  3# 2x10  3# 2x10  3# 2x10   2# 2x10  2# 2x10  2# 2x10    Tricep kickback 2# 2x10 2# 2x10  3# 2x10  3# 2x10  3# 2x10  3# 2x10   2# 2x10  2# 2x10  2# 2x10    TRX walk through 2x10  2x10  2x10  2x10  2x10  2x10   2x10 2x10  2x10    BOR  2# 2x10  3# 2x10  3# 2x10  3# 2x10  3# 2x10        Ther Activity                                       Gait Training                                       Modalities             CP R GH 10     10' 10'  10' 10' 10'

## 2021-08-20 ENCOUNTER — APPOINTMENT (OUTPATIENT)
Dept: PHYSICAL THERAPY | Facility: CLINIC | Age: 61
End: 2021-08-20
Payer: COMMERCIAL

## 2021-08-24 ENCOUNTER — OFFICE VISIT (OUTPATIENT)
Dept: PHYSICAL THERAPY | Facility: CLINIC | Age: 61
End: 2021-08-24
Payer: COMMERCIAL

## 2021-08-24 DIAGNOSIS — Z96.611 STATUS POST REVERSE TOTAL REPLACEMENT OF RIGHT SHOULDER: Primary | ICD-10-CM

## 2021-08-24 PROCEDURE — 97110 THERAPEUTIC EXERCISES: CPT | Performed by: PHYSICAL THERAPIST

## 2021-08-24 PROCEDURE — 97112 NEUROMUSCULAR REEDUCATION: CPT | Performed by: PHYSICAL THERAPIST

## 2021-08-24 NOTE — PROGRESS NOTES
Daily Note     Today's date: 2021  Patient name: Alvarez King  : 1960  MRN: [id number]  Referring provider: Frankey Reil, DO  Dx:   Encounter Diagnosis     ICD-10-CM    1  Status post reverse total replacement of right shoulder  Z96 611                   Subjective:  Patient states she's able to do more this week then she was a month ago  States she's doing well  Objective: See treatment diary below      Assessment: Tolerated treatment well  Patient exhibits improved ease with exercises and active elevation of the UE  She has no issues during or post session  She has no complaints post session  Plan: Continue per plan of care        Precautions: h/o L reverse TSA, R reverse TSA 3/3/21      Manuals 7/29 8/4 8/6 8/10  8/13 8/17 8/24      Re-eval                                                    Neuro Re-Ed             Alt iso- IR/ER at 45*  x20 sec x20 sec x20 sec x20 sec x20 sec 2x20"  2x20"       Rhythmic stab at 90 deg flex x20 sec x20 sec x20 sec x20 sec x20 sec  2x20"  2x20"       PSR             scap pinches             scap depression             submax delt isometrics             Submax IR/ER isometrics             Standing cane flexion  1# 2x10   2# 2x10  2# 2x10  2# 2x10        sidelying shoulder abd to 90      2x10  2x10 2x10       sidelying shoulder ER      2x10  2x10 2x10       Standing cane abduction 1# 2x10    2# 2x10  2# 2x10  2# 2x10        TB Row Red 2x10  Green 2x10  Green 2x10  Green 2x10  Green 2x10  Green 2x10  Green 2x10       TB Ext Red 2x10  Green 2x10  Green 2x10  Green 2x10  Green 2x10  Green 2x10  Green 2x10       Active inf glide w RUE elevation (hi/low table)             Ther Ex             PROM per protocol 5' 5' 5' 5' 5' 5' 5'      RetroUBE Alt fwd/bwd x6' Alt fwd/bwd x6'  Alt fwd/bwd x6'  Alt fwd/bwd x6'  Alt fwd/bwd x6'  Alt fwd/bwd x6'  Alt fwd/bwd x6'       Cane chest press 2# 2x10    2# 2x10  2# 2x10  2# 2x10  2# 2x10       Supine cane flexion    2# 2x10  2# 2x10  2# 2x10  2# 2x10       Finger ladder AAROM x20 2# x20            Standing tricep pushdown             Bicep curl 2# 2x10 2# 2x10  3# 2x10  3# 2x10  3# 2x10  3# 2x10  3# 2x10       Tricep kickback 2# 2x10 2# 2x10  3# 2x10  3# 2x10  3# 2x10  3# 2x10  3# 2x10       TRX walk through 2x10  2x10  2x10  2x10  2x10  2x10  2x10       BOR  2# 2x10  3# 2x10  3# 2x10  3# 2x10  3# 2x10  3# 2x10       Ther Activity                                       Gait Training                                       Modalities             CP R GH 10     10' 10' 10'

## 2021-08-27 ENCOUNTER — OFFICE VISIT (OUTPATIENT)
Dept: PHYSICAL THERAPY | Facility: CLINIC | Age: 61
End: 2021-08-27
Payer: COMMERCIAL

## 2021-08-27 DIAGNOSIS — Z96.611 STATUS POST REVERSE TOTAL REPLACEMENT OF RIGHT SHOULDER: Primary | ICD-10-CM

## 2021-08-27 PROCEDURE — 97110 THERAPEUTIC EXERCISES: CPT | Performed by: PHYSICAL THERAPIST

## 2021-08-27 PROCEDURE — 97112 NEUROMUSCULAR REEDUCATION: CPT | Performed by: PHYSICAL THERAPIST

## 2021-08-27 NOTE — PROGRESS NOTES
Daily Note     Today's date: 2021  Patient name: Obey Harris  : 1960  MRN: [id number]  Referring provider: Devora Barney DO  Dx:   Encounter Diagnosis     ICD-10-CM    1  Status post reverse total replacement of right shoulder  Z96 611                   Subjective:  Patient states, "I'm doing better this week  I think the swimming is helping "      Objective: See treatment diary below      Assessment: Tolerated treatment well  No issues during or post session  She continues to exhibit improved strength overall  She will benefit from progression of IR/ER strength with walkouts next visit  She leaves session without complaint  Plan: Continue per plan of care        Precautions: h/o L reverse TSA, R reverse TSA 3/3/21      Manuals 7/29 8/4 8/6 8/10  8/13 8/17 8/24 8/27     Re-eval                                                    Neuro Re-Ed             Alt iso- IR/ER at 45*  x20 sec x20 sec x20 sec x20 sec x20 sec 2x20"  2x20"  2x20"      Rhythmic stab at 90 deg flex x20 sec x20 sec x20 sec x20 sec x20 sec  2x20"  2x20"  2x20"      PSR             scap pinches             scap depression             submax delt isometrics             Submax IR/ER isometrics             Standing cane flexion  1# 2x10   2# 2x10  2# 2x10  2# 2x10   2# 2x10      sidelying shoulder abd to 90      2x10  2x10 2x10  2x10      sidelying shoulder ER      2x10  2x10 2x10  2x10  ER walkout    Standing cane abduction 1# 2x10    2# 2x10  2# 2x10  2# 2x10   2# 2x10      TB Row Red 2x10  Green 2x10  Green 2x10  Green 2x10  Green 2x10  Green 2x10  Green 2x10  Green 2x10      TB Ext Red 2x10  Green 2x10  Green 2x10  Green 2x10  Green 2x10  Green 2x10  Green 2x10  Green 2x10      Active inf glide w RUE elevation (hi/low table)             Ther Ex             PROM per protocol 5' 5' 5' 5' 5' 5' 5' 5'     RetroUBE Alt fwd/bwd x6' Alt fwd/bwd x6'  Alt fwd/bwd x6'  Alt fwd/bwd x6'  Alt fwd/bwd x6'  Alt fwd/bwd x6'  Alt fwd/bwd x6'  Alt fwd/bwd x6'      Cane chest press 2# 2x10    2# 2x10  2# 2x10  2# 2x10  2# 2x10  2# 2x10      Supine cane flexion    2# 2x10  2# 2x10  2# 2x10  2# 2x10  2# 2x10      Wall slides        x20     Standing tricep pushdown             Bicep curl 2# 2x10 2# 2x10  3# 2x10  3# 2x10  3# 2x10  3# 2x10  3# 2x10  3# 2x10      Tricep kickback 2# 2x10 2# 2x10  3# 2x10  3# 2x10  3# 2x10  3# 2x10  3# 2x10  3# 2x10      TRX walk through 2x10  2x10  2x10  2x10  2x10  2x10  2x10  2x10     BOR  2# 2x10  3# 2x10  3# 2x10  3# 2x10  3# 2x10  3# 2x10  3# 2x10      Ther Activity                                       Gait Training                                       Modalities             CP R GH 10     10' 10' 10' 10'

## 2021-08-31 ENCOUNTER — OFFICE VISIT (OUTPATIENT)
Dept: PHYSICAL THERAPY | Facility: CLINIC | Age: 61
End: 2021-08-31
Payer: COMMERCIAL

## 2021-08-31 DIAGNOSIS — Z96.611 STATUS POST REVERSE TOTAL REPLACEMENT OF RIGHT SHOULDER: Primary | ICD-10-CM

## 2021-08-31 PROCEDURE — 97110 THERAPEUTIC EXERCISES: CPT | Performed by: PHYSICAL THERAPIST

## 2021-08-31 PROCEDURE — 97112 NEUROMUSCULAR REEDUCATION: CPT | Performed by: PHYSICAL THERAPIST

## 2021-08-31 NOTE — PROGRESS NOTES
Daily Note     Today's date: 2021  Patient name: Ruth Salgado  : 1960  MRN: [id number]  Referring provider: Rufus Lopez DO  Dx:   Encounter Diagnosis     ICD-10-CM    1  Status post reverse total replacement of right shoulder  Z96 611                   Subjective:  Patient continues to report that she is doing well  Reports slight neck pain today  Objective: See treatment diary below      Assessment: Tolerated treatment well  Verbal cueing to rest between exercise in order to perform posterior shoulder rolls to decrease neck pain  She is able to perform exercises without difficulty, but requires adequate rest breaks  Improved strength noted with rhythmic stabilization and alternating isometrics  No complaint post session  Plan: Continue per plan of care        Precautions: h/o L reverse TSA, R reverse TSA 3/3/21      Manuals 7/29 8/4 8/6 8/10  8/13 8/17 8/24 8/27 8/31    Re-eval                                                    Neuro Re-Ed             Alt iso- IR/ER at 45*  x20 sec x20 sec x20 sec x20 sec x20 sec 2x20"  2x20"  2x20"  2x20"    Rhythmic stab at 90 deg flex x20 sec x20 sec x20 sec x20 sec x20 sec  2x20"  2x20"  2x20"  2x20"    PSR             scap pinches             scap depression             submax delt isometrics             Submax IR/ER isometrics             Standing cane flexion  1# 2x10   2# 2x10  2# 2x10  2# 2x10   2# 2x10  3# 2x10     sidelying shoulder abd to 90      2x10  2x10 2x10  2x10      sidelying shoulder ER      2x10  2x10 2x10  2x10  ER walkoutNV    Standing cane abduction 1# 2x10    2# 2x10  2# 2x10  2# 2x10   2# 2x10  3# 2x10     TB Row Red 2x10  Green 2x10  Green 2x10  Green 2x10  Green 2x10  Green 2x10  Green 2x10  Green 2x10  Green 2x10     TB Ext Red 2x10  Green 2x10  Green 2x10  Green 2x10  Green 2x10  Green 2x10  Green 2x10  Green 2x10  Green 2x10     Active inf glide w RUE elevation (hi/low table)             Ther Ex PROM per protocol 5' 5' 5' 5' 5' 5' 5' 5' 5'    RetroUBE Alt fwd/bwd x6' Alt fwd/bwd x6'  Alt fwd/bwd x6'  Alt fwd/bwd x6'  Alt fwd/bwd x6'  Alt fwd/bwd x6'  Alt fwd/bwd x6'  Alt fwd/bwd x6'  Alt fwd/bwd x6'     Cane chest press 2# 2x10    2# 2x10  2# 2x10  2# 2x10  2# 2x10  2# 2x10  3# 2x10     Supine cane flexion    2# 2x10  2# 2x10  2# 2x10  2# 2x10  2# 2x10  3# 2x10     Wall slides        x20 x20     Standing tricep pushdown             Bicep curl 2# 2x10 2# 2x10  3# 2x10  3# 2x10  3# 2x10  3# 2x10  3# 2x10  3# 2x10  3# 2x10     Tricep kickback 2# 2x10 2# 2x10  3# 2x10  3# 2x10  3# 2x10  3# 2x10  3# 2x10  3# 2x10  3# 2x10     TRX walk through 2x10  2x10  2x10  2x10  2x10  2x10  2x10  2x10 2x10    BOR  2# 2x10  3# 2x10  3# 2x10  3# 2x10  3# 2x10  3# 2x10  3# 2x10  3# 2x10     Ther Activity                                       Gait Training                                       Modalities             CP R [address] 10     10' 10' 10' 10' 10'

## 2021-09-03 ENCOUNTER — OFFICE VISIT (OUTPATIENT)
Dept: PHYSICAL THERAPY | Facility: CLINIC | Age: 61
End: 2021-09-03
Payer: COMMERCIAL

## 2021-09-03 DIAGNOSIS — Z96.611 STATUS POST REVERSE TOTAL REPLACEMENT OF RIGHT SHOULDER: Primary | ICD-10-CM

## 2021-09-03 PROCEDURE — 97112 NEUROMUSCULAR REEDUCATION: CPT | Performed by: PHYSICAL THERAPIST

## 2021-09-03 PROCEDURE — 97110 THERAPEUTIC EXERCISES: CPT | Performed by: PHYSICAL THERAPIST

## 2021-09-03 NOTE — PROGRESS NOTES
PT Re-Evaluation     Today's date: 9/3/2021  Patient name: Sushma Harris  : 1960  MRN: [id number]  Referring provider: Tonie Comer DO  Dx:   Encounter Diagnosis     ICD-10-CM    1  Status post reverse total replacement of right shoulder  Z96 611                   Assessment  Assessment details: Patient is a 63 y/o female s/p R reverse TSA on 3/3/21  Since starting therapy, she presents with improved shoulder PROM, AROM, and AAROM  Her exercise tolerance has improved  She continues to exhibit improved AROM, specifically, and improved independence with ADLs and self care  She will continue to benefit from skilled PT to address remaining impairments  Recommend continued PT  Impairments: abnormal or restricted ROM, activity intolerance, impaired physical strength, lacks appropriate home exercise program and pain with function  Understanding of Dx/Px/POC: good   Prognosis: good    Goals  STG within 4 weeks:   1  Patient to be independent in HEP  MET  2  Reduce pain by 50% to improve quality of life  MET  3  Improve PROM to listed limits on protocol  MET  LTG within 12 weeks:   1  Patient to be independent in ADLs/IADLs without difficulty  IN PROGRESS  2  Patient to achieve functional elevation of shoulder  IN PROGRESS  3  Patient to be able to lift household items   IN PROGRESS    Plan  Plan details: Per Protocol  Patient would benefit from: skilled physical therapy and PT eval  Planned modality interventions: cryotherapy, hydrotherapy and unattended electrical stimulation  Planned therapy interventions: therapeutic training, therapeutic exercise, therapeutic activities, stretching, strengthening, postural training, patient education, neuromuscular re-education, manual therapy, joint mobilization, IADL retraining, activity modification, ADL retraining, ADL training, body mechanics training, flexibility, functional ROM exercises, gait training, graded activity, graded exercise, graded motor and home exercise program  Frequency: 2x week  Duration in weeks: 4  Plan of Care beginning date: 9/3/2021  Plan of Care expiration date: 10/1/2021  Treatment plan discussed with: patient        Subjective Evaluation    History of Present Illness  Date of surgery: 3/3/2021  Mechanism of injury: Patient is a 63 y/o female s/p R reverse TSA on 3/3/21  Since starting therapy, she reports overall improvements with ADLs  She's able to participate in self care and ADLs better  She exhibits more independence  She only reports 4/10 pain when she uses her arm too much  Otherwise, reporting 0/10 pain  Pain  Current pain ratin  At best pain ratin  At worst pain ratin  Location: posterior right shoulder   Quality: dull ache  Relieving factors: ice  Progression: improved    Social Support  Steps to enter house: yes  Stairs in house: yes   Lives in: multiple-level home  Lives with: spouse and adult children    Employment status: not working  Hand dominance: right  Exercise history: light shoulder exercises, per physician   Life stress: Low       Diagnostic Tests  Abnormal x-ray: X-rays of the right shoulder display maintenance of reverse shoulder arthroplasty alignment  No interval sign of healing of greater tuberosity    Treatments  Previous treatment: physical therapy  Patient Goals  Patient goal: "to be able to have more function"        Objective     Active Range of Motion   Left Shoulder   Flexion: 125 degrees   Extension: 48 degrees   Abduction: 105 degrees   External rotation 0°: 30 degrees   Internal rotation BTB: L5     Right Shoulder   Flexion: 90 degrees   Abduction: 80 degrees   External rotation 0°: 45 degrees   Internal rotation BTB: sacrum     Passive Range of Motion     Right Shoulder   Flexion: 135 degrees   Abduction: 115 degrees   External rotation 45°: 60 degrees     Strength/Myotome Testing     Right Shoulder     Planes of Motion   Flexion: 2+   Abduction: 2+   External rotation at 0°: 2+ Internal rotation at 0°: 2+     Additional Strength Details  Left shoulder: able to hold against minimal to moderate resistance in all planes                  Precautions: h/o L reverse TSA, R reverse TSA 3/3/21        Manuals 7/29 8/4 8/6 8/10  8/13 8/17 8/24 8/27 8/31 9/3   Re-eval                                                    Neuro Re-Ed             Alt iso- IR/ER at 45*  x20 sec x20 sec x20 sec x20 sec x20 sec 2x20"  2x20"  2x20"  2x20" 2x20"   Rhythmic stab at 90 deg flex x20 sec x20 sec x20 sec x20 sec x20 sec  2x20"  2x20"  2x20"  2x20" 2x20"   PSR             scap pinches             scap depression             submax delt isometrics             Submax IR/ER isometrics             Standing cane flexion  1# 2x10   2# 2x10  2# 2x10  2# 2x10   2# 2x10  3# 2x10  3# 2x10    sidelying shoulder abd to 90      2x10  2x10 2x10  2x10      sidelying shoulder ER      2x10  2x10 2x10  2x10  ER walkoutNV IR/ER walkout yel x10 ea    Standing cane abduction 1# 2x10    2# 2x10  2# 2x10  2# 2x10   2# 2x10  3# 2x10  3# 2x10    TB Row Red 2x10  Green 2x10  Green 2x10  Green 2x10  Green 2x10  Green 2x10  Green 2x10  Green 2x10  Green 2x10  Green 2x10   TB Ext Red 2x10  Green 2x10  Green 2x10  Green 2x10  Green 2x10  Green 2x10  Green 2x10  Green 2x10  Green 2x10  Green 2x10   Active inf glide w RUE elevation (hi/low table)             Ther Ex             PROM per protocol 5' 5' 5' 5' 5' 5' 5' 5' 5'    RetroUBE Alt fwd/bwd x6' Alt fwd/bwd x6'  Alt fwd/bwd x6'  Alt fwd/bwd x6'  Alt fwd/bwd x6'  Alt fwd/bwd x6'  Alt fwd/bwd x6'  Alt fwd/bwd x6'  Alt fwd/bwd x6'  Alt fwd/bwd x7'   Cane chest press 2# 2x10    2# 2x10  2# 2x10  2# 2x10  2# 2x10  2# 2x10  3# 2x10  3# 2x10    Supine cane flexion    2# 2x10  2# 2x10  2# 2x10  2# 2x10  2# 2x10  3# 2x10  3# 2x10    Wall slides        x20 x20     Standing tricep pushdown          Green 2x10    Bicep curl 2# 2x10 2# 2x10  3# 2x10  3# 2x10  3# 2x10  3# 2x10  3# 2x10  3# 2x10  3# 2x10 3# x25   Tricep kickback 2# 2x10 2# 2x10  3# 2x10  3# 2x10  3# 2x10  3# 2x10  3# 2x10  3# 2x10  3# 2x10  3# x25   TRX walk through 2x10  2x10  2x10  2x10  2x10  2x10  2x10  2x10 2x10 2x10   BOR  2# 2x10  3# 2x10  3# 2x10  3# 2x10  3# 2x10  3# 2x10  3# 2x10  3# 2x10  3# x25   Ther Activity                                       Gait Training                                       Modalities             CP R GH 10     10' 10' 10' 10' 10' 10'

## 2021-09-07 ENCOUNTER — OFFICE VISIT (OUTPATIENT)
Dept: PHYSICAL THERAPY | Facility: CLINIC | Age: 61
End: 2021-09-07
Payer: COMMERCIAL

## 2021-09-07 DIAGNOSIS — Z96.611 STATUS POST REVERSE TOTAL REPLACEMENT OF RIGHT SHOULDER: Primary | ICD-10-CM

## 2021-09-07 PROCEDURE — 97112 NEUROMUSCULAR REEDUCATION: CPT | Performed by: PHYSICAL THERAPIST

## 2021-09-07 PROCEDURE — 97110 THERAPEUTIC EXERCISES: CPT | Performed by: PHYSICAL THERAPIST

## 2021-09-07 NOTE — PROGRESS NOTES
Daily Note     Today's date: 2021  Patient name: Shira Love  : 1960  MRN: [id number]  Referring provider: Valentin Quiñones DO  Dx:   Encounter Diagnosis     ICD-10-CM    1  Status post reverse total replacement of right shoulder  Z96 611                   Subjective: Patient has no new complaints  States all exercises are getting easier  Objective: See treatment diary below      Assessment: Tolerated treatment well  She's able to add reps of exercises, as listed below  She does well with session  No complaints of pain  Strength continues to improve  Patient would benefit from continued PT      Plan: Continue per plan of care        Precautions: h/o L reverse TSA, R reverse TSA 3/3/21        Manuals 9/7   8/10  8/13 8/17 8/24 8/27 8/31 9/3   Re-eval                                                    Neuro Re-Ed             Alt iso- IR/ER at 45*  2x20"    x20 sec x20 sec 2x20"  2x20"  2x20"  2x20" 2x20"   Rhythmic stab at 90 deg flex 2x20"   x20 sec x20 sec  2x20"  2x20"  2x20"  2x20" 2x20"   PSR             scap pinches             scap depression             submax delt isometrics             Submax IR/ER isometrics             Standing cane flexion  3# 2x10    2# 2x10  2# 2x10  2# 2x10   2# 2x10  3# 2x10  3# 2x10    sidelying shoulder abd to 90      2x10  2x10 2x10  2x10      sidelying shoulder ER      2x10  2x10 2x10  2x10  ER walkoutNV IR/ER walkout yel x10 ea    Standing cane abduction 3# 2x10    2# 2x10  2# 2x10  2# 2x10   2# 2x10  3# 2x10  3# 2x10    TB Row Green 2x10    Green 2x10  Green 2x10  Green 2x10  Green 2x10  Green 2x10  Green 2x10  Green 2x10   TB Ext Green 2x10    Green 2x10  Green 2x10  Green 2x10  Green 2x10  Green 2x10  Green 2x10  Green 2x10   Active inf glide w RUE elevation (hi/low table)             Ther Ex             PROM per protocol Flex 3'    5' 5' 5' 5' 5' 5'    RetroUBE Alt fwd/bwd x7'   Alt fwd/bwd x6'  Alt fwd/bwd x6'  Alt fwd/bwd x6'  Alt fwd/bwd x6' Alt fwd/bwd x6'  Alt fwd/bwd x6'  Alt fwd/bwd x7'   Cane chest press 3# 2x10    2# 2x10  2# 2x10  2# 2x10  2# 2x10  2# 2x10  3# 2x10  3# 2x10    Supine cane flexion 3# 2x10    2# 2x10  2# 2x10  2# 2x10  2# 2x10  2# 2x10  3# 2x10  3# 2x10    Wall slides x25       x20 x20     Standing tricep pushdown          Green 2x10    Bicep curl 3# x30    3# 2x10  3# 2x10  3# 2x10  3# 2x10  3# 2x10  3# 2x10  3# x25   Tricep kickback 3# x30    3# 2x10  3# 2x10  3# 2x10  3# 2x10  3# 2x10  3# 2x10  3# x25   TRX walk through 2x10    2x10  2x10  2x10  2x10  2x10 2x10 2x10   BOR 3# x30    3# 2x10  3# 2x10  3# 2x10  3# 2x10  3# 2x10  3# 2x10  3# x25   Ther Activity                                       Gait Training                                       Modalities             CP R [facility] 10'    10' 10' 10' 10' 10' 10'

## 2021-09-09 ENCOUNTER — OFFICE VISIT (OUTPATIENT)
Dept: PHYSICAL THERAPY | Facility: CLINIC | Age: 61
End: 2021-09-09
Payer: COMMERCIAL

## 2021-09-09 DIAGNOSIS — Z96.611 STATUS POST REVERSE TOTAL REPLACEMENT OF RIGHT SHOULDER: Primary | ICD-10-CM

## 2021-09-09 PROCEDURE — 97110 THERAPEUTIC EXERCISES: CPT

## 2021-09-09 PROCEDURE — 97112 NEUROMUSCULAR REEDUCATION: CPT

## 2021-09-09 NOTE — PROGRESS NOTES
Daily Note     Today's date: 2021  Patient name: Angelina Campa  : 1960  MRN: [id number]  Referring provider: Edwina Kruse DO  Dx:   Encounter Diagnosis     ICD-10-CM    1  Status post reverse total replacement of right shoulder  Z96 611          Subjective: Patient noted no pain currently in R shoulder pre treatment  Objective: See treatment diary below      Assessment: Tolerated treatment fair  Patient was able to perform exercises with correct form  Patient was able to perform bicep curls, BOR, and tricep kickbacks with increased weight today with no complaints  Patient would benefit from continued PT      Plan: Continue per plan of care        Precautions: h/o L reverse TSA, R reverse TSA 3/3/21        Manuals 9/7 9/9  8/10  8/13 8/17 8/24 8/27 8/31 9/3   Re-eval                                                    Neuro Re-Ed             Alt iso- IR/ER at 45*  2x20"  2x20"  x20 sec x20 sec 2x20"  2x20"  2x20"  2x20" 2x20"   Rhythmic stab at 90 deg flex 2x20" 2x20"  x20 sec x20 sec  2x20"  2x20"  2x20"  2x20" 2x20"   PSR             scap pinches             scap depression             submax delt isometrics             Submax IR/ER isometrics             Standing cane flexion  3# 2x10  #3 2x10  2# 2x10  2# 2x10  2# 2x10   2# 2x10  3# 2x10  3# 2x10    sidelying shoulder abd to 90      2x10  2x10 2x10  2x10      sidelying shoulder ER      2x10  2x10 2x10  2x10  ER walkoutNV IR/ER walkout yel x10 ea    Standing cane abduction 3# 2x10  3# 2x10  2# 2x10  2# 2x10  2# 2x10   2# 2x10  3# 2x10  3# 2x10    TB Row Green 2x10  Green 2x10  Green 2x10  Green 2x10  Green 2x10  Green 2x10  Green 2x10  Green 2x10  Green 2x10   TB Ext Green 2x10  Green 2x10  Green 2x10  Green 2x10  Green 2x10  Green 2x10  Green 2x10  Green 2x10  Green 2x10   Active inf glide w RUE elevation (hi/low table)             Ther Ex             PROM per protocol Flex 3'  Flex 3'  5' 5' 5' 5' 5' 5'    RetroUBE Alt fwd/bwd x7' Alt fwd/bwd x 7'   Alt fwd/bwd x6'  Alt fwd/bwd x6'  Alt fwd/bwd x6'  Alt fwd/bwd x6'  Alt fwd/bwd x6'  Alt fwd/bwd x6'  Alt fwd/bwd x7'   Cane chest press 3# 2x10  3# 2x10  2# 2x10  2# 2x10  2# 2x10  2# 2x10  2# 2x10  3# 2x10  3# 2x10    Supine cane flexion 3# 2x10  #3 2x10  2# 2x10  2# 2x10  2# 2x10  2# 2x10  2# 2x10  3# 2x10  3# 2x10    Wall slides x25 x25      x20 x20     Standing tricep pushdown          Green 2x10    Bicep curl 3# x30  #4 x20  3# 2x10  3# 2x10  3# 2x10  3# 2x10  3# 2x10  3# 2x10  3# x25   Tricep kickback 3# x30  #4 x 20  3# 2x10  3# 2x10  3# 2x10  3# 2x10  3# 2x10  3# 2x10  3# x25   TRX walk through 2x10  2x10  2x10  2x10  2x10  2x10  2x10 2x10 2x10   BOR 3# x30  #4x 20  3# 2x10  3# 2x10  3# 2x10  3# 2x10  3# 2x10  3# 2x10  3# x25   Ther Activity                                       Gait Training                                       Modalities             CP R GH 10'    10' 10' 10' 10' 10' 10'

## 2021-09-14 ENCOUNTER — OFFICE VISIT (OUTPATIENT)
Dept: PHYSICAL THERAPY | Facility: CLINIC | Age: 61
End: 2021-09-14
Payer: COMMERCIAL

## 2021-09-14 DIAGNOSIS — Z96.611 STATUS POST REVERSE TOTAL REPLACEMENT OF RIGHT SHOULDER: Primary | ICD-10-CM

## 2021-09-14 PROCEDURE — 97110 THERAPEUTIC EXERCISES: CPT | Performed by: PHYSICAL THERAPIST

## 2021-09-14 PROCEDURE — 97112 NEUROMUSCULAR REEDUCATION: CPT | Performed by: PHYSICAL THERAPIST

## 2021-09-14 NOTE — PROGRESS NOTES
Daily Note     Today's date: 2021  Patient name: Chastity Hardy  : 1960  MRN: [id number]  Referring provider: Rick De León DO  Dx:   Encounter Diagnosis     ICD-10-CM    1  Status post reverse total replacement of right shoulder  Z96 611          Subjective: Patient stated no pain prior to treatment session  Objective: See treatment diary below      Assessment: Patient performed exercises without c/o pain; minimal pain with manual shoulder PROM  Plan: Continue per plan of care        Precautions: h/o L reverse TSA, R reverse TSA 3/3/21        Manuals 9/7 9/9 9/14 8/10  8/13 8/17 8/24 8/27 8/31 9/3   Re-eval                                                    Neuro Re-Ed             Alt iso- IR/ER at 45*  2x20"  2x20" 2x20" x20 sec x20 sec 2x20"  2x20"  2x20"  2x20" 2x20"   Rhythmic stab at 90 deg flex 2x20" 2x20" 2x20" x20 sec x20 sec  2x20"  2x20"  2x20"  2x20" 2x20"   PSR             scap pinches             scap depression             submax delt isometrics             Submax IR/ER isometrics             Standing cane flexion  3# 2x10  #3 2x10 #3 2x10 2# 2x10  2# 2x10  2# 2x10   2# 2x10  3# 2x10  3# 2x10    sidelying shoulder abd to 90      2x10  2x10 2x10  2x10      sidelying shoulder ER      2x10  2x10 2x10  2x10  ER walkoutNV IR/ER walkout yel x10 ea    Standing cane abduction 3# 2x10  3# 2x10 #3 2x10 2# 2x10  2# 2x10  2# 2x10   2# 2x10  3# 2x10  3# 2x10    TB Row Green 2x10  Green 2x10 Green 2x10 Green 2x10  Green 2x10  Green 2x10  Green 2x10  Green 2x10  Green 2x10  Green 2x10   TB Ext Green 2x10  Green 2x10 Green 2x10 Green 2x10  Green 2x10  Green 2x10  Green 2x10  Green 2x10  Green 2x10  Green 2x10   Active inf glide w RUE elevation (hi/low table)             Ther Ex             PROM per protocol Flex 3'  Flex 3' Flex 3' 5' 5' 5' 5' 5' 5'    RetroUBE Alt fwd/bwd x7' Alt fwd/bwd x 7'  Alt fwd/bwd x 7'  Alt fwd/bwd x6'  Alt fwd/bwd x6'  Alt fwd/bwd x6'  Alt fwd/bwd x6' Alt fwd/bwd x6'  Alt fwd/bwd x6'  Alt fwd/bwd x7'   Cane chest press 3# 2x10  3# 2x10 3# 2x10 2# 2x10  2# 2x10  2# 2x10  2# 2x10  2# 2x10  3# 2x10  3# 2x10    Supine cane flexion 3# 2x10  #3 2x10 3# 2x10 2# 2x10  2# 2x10  2# 2x10  2# 2x10  2# 2x10  3# 2x10  3# 2x10    Wall slides x25 x25 x25     x20 x20     Standing tricep pushdown          Green 2x10    Bicep curl 3# x30  #4 x20 #4 x20 3# 2x10  3# 2x10  3# 2x10  3# 2x10  3# 2x10  3# 2x10  3# x25   Tricep kickback 3# x30  #4 x 20 #4 x20 3# 2x10  3# 2x10  3# 2x10  3# 2x10  3# 2x10  3# 2x10  3# x25   TRX walk through 2x10  2x10 2x10 2x10  2x10  2x10  2x10  2x10 2x10 2x10   BOR 3# x30  #4x 20  3# 2x10  3# 2x10  3# 2x10  3# 2x10  3# 2x10  3# 2x10  3# x25   Ther Activity                                       Gait Training                                       Modalities             CP R GH 10'    10' 10' 10' 10' 10' 10'

## 2021-09-17 ENCOUNTER — OFFICE VISIT (OUTPATIENT)
Dept: PHYSICAL THERAPY | Facility: CLINIC | Age: 61
End: 2021-09-17
Payer: COMMERCIAL

## 2021-09-17 DIAGNOSIS — Z96.611 STATUS POST REVERSE TOTAL REPLACEMENT OF RIGHT SHOULDER: Primary | ICD-10-CM

## 2021-09-17 PROCEDURE — 97112 NEUROMUSCULAR REEDUCATION: CPT | Performed by: PHYSICAL THERAPIST

## 2021-09-17 PROCEDURE — 97110 THERAPEUTIC EXERCISES: CPT | Performed by: PHYSICAL THERAPIST

## 2021-09-17 NOTE — PROGRESS NOTES
Daily Note     Today's date: 2021  Patient name: Kuldeep Dumont  : 1960  MRN: [id number]  Referring provider: Adali Simon DO  Dx:   Encounter Diagnosis     ICD-10-CM    1  Status post reverse total replacement of right shoulder  Z96 611          Subjective: Patient states she was a little sore yesterday  However, notes continued improvements with AROM  Objective: See treatment diary below      Assessment: Patient exhibits AROM elevation beyond 90 degrees which correlates to improved ADL function  No complaints post session  She will continue to benefit from skilled PT to address remaining impairments  Plan: Continue per plan of care        Precautions: h/o L reverse TSA, R reverse TSA 3/3/21        Manuals 9/7 9/9 9/14 9/17    8/27 8/31 9/3   Re-eval                                                    Neuro Re-Ed             Alt iso- IR/ER at 45*  2x20"  2x20" 2x20"     2x20"  2x20" 2x20"   Rhythmic stab at 90 deg flex 2x20" 2x20" 2x20"     2x20"  2x20" 2x20"   PSR             scap pinches             scap depression             submax delt isometrics             Submax IR/ER isometrics             Standing cane flexion  3# 2x10  #3 2x10 #3 2x10 3# 2x10     2# 2x10  3# 2x10  3# 2x10    sidelying shoulder abd to 90     1# 2x10     2x10      sidelying shoulder ER     1# 2x10     2x10  ER walkoutNV IR/ER walkout yel x10 ea    Standing cane abduction 3# 2x10  3# 2x10 #3 2x10 3# 2x10     2# 2x10  3# 2x10  3# 2x10    TB Row Green 2x10  Green 2x10 Green 2x10 Green 2x10     Green 2x10  Green 2x10  Green 2x10   TB Ext Green 2x10  Green 2x10 Green 2x10 Green 2x10     Green 2x10  Green 2x10  Green 2x10   Active inf glide w RUE elevation (hi/low table)             Ther Ex             PROM per protocol Flex 3'  Flex 3' Flex 3' 5'    5' 5'    RetroUBE Alt fwd/bwd x7' Alt fwd/bwd x 7'  Alt fwd/bwd x 7'  Alt fwd/bwd x 7'     Alt fwd/bwd x6'  Alt fwd/bwd x6'  Alt fwd/bwd x7'   Cane chest press 3# 2x10  3# 2x10 3# 2x10 3# 2x10     2# 2x10  3# 2x10  3# 2x10    Supine cane flexion 3# 2x10  #3 2x10 3# 2x10 3# 2x10     2# 2x10  3# 2x10  3# 2x10    Wall slides x25 x25 x25 x20    x20 x20     Standing tricep pushdown          Green 2x10    Bicep curl 3# x30  #4 x20 #4 x20 3# x20     3# 2x10  3# 2x10  3# x25   Tricep kickback 3# x30  #4 x 20 #4 x20 3# x20     3# 2x10  3# 2x10  3# x25   TRX walk through 2x10  2x10 2x10 2x10     2x10 2x10 2x10   BOR 3# x30  #4x 20  3# x20    3# 2x10  3# 2x10  3# x25   Ther Activity                                       Gait Training                                       Modalities             CP R GH 10'   10'    10' 10' 10'

## 2021-09-20 ENCOUNTER — OFFICE VISIT (OUTPATIENT)
Dept: OBGYN CLINIC | Facility: CLINIC | Age: 61
End: 2021-09-20
Payer: COMMERCIAL

## 2021-09-20 ENCOUNTER — APPOINTMENT (OUTPATIENT)
Dept: RADIOLOGY | Facility: CLINIC | Age: 61
End: 2021-09-20
Payer: COMMERCIAL

## 2021-09-20 VITALS
BODY MASS INDEX: 48.37 KG/M2 | HEART RATE: 97 BPM | SYSTOLIC BLOOD PRESSURE: 169 MMHG | WEIGHT: 256.2 LBS | DIASTOLIC BLOOD PRESSURE: 83 MMHG | HEIGHT: 61 IN

## 2021-09-20 DIAGNOSIS — Z96.611 STATUS POST REVERSE TOTAL REPLACEMENT OF RIGHT SHOULDER: Primary | ICD-10-CM

## 2021-09-20 DIAGNOSIS — M25.511 ACUTE PAIN OF RIGHT SHOULDER: ICD-10-CM

## 2021-09-20 PROCEDURE — 99213 OFFICE O/P EST LOW 20 MIN: CPT | Performed by: ORTHOPAEDIC SURGERY

## 2021-09-20 PROCEDURE — 73030 X-RAY EXAM OF SHOULDER: CPT

## 2021-09-20 PROCEDURE — 3008F BODY MASS INDEX DOCD: CPT | Performed by: ORTHOPAEDIC SURGERY

## 2021-09-20 PROCEDURE — 4004F PT TOBACCO SCREEN RCVD TLK: CPT | Performed by: ORTHOPAEDIC SURGERY

## 2021-09-20 NOTE — PROGRESS NOTES
Patient Name:  Petra Shahid  MRN:  [id number]    [address] [place] [address]     1  Status post reverse total replacement of right shoulder    2  Acute pain of right shoulder  -     XR shoulder 2+ vw right; Future; Expected date: 09/20/2021        64year old female approximately 6 5 months s/p Right reverse shoulder arthroplasty performed 03/03/2021  Overall, patient with improvements of range of motion and pain  Advised patient to continue attendance of PT for continued ROM and strengthening; can decrease frequency of appointments if indicated  Continue home exercises  Caution with carrying heavy items such as groceries  Verbalized understanding of the above and will follow up in office in approximately 6 months for reevaluation  History of the Present Illness   Petra Shahid is a 64 y o  female approximately 6 5 months s/p Right reverse shoulder arthroplasty performed 03/03/2021  At last appointment, patient was improving with PT with ROM and instructed to continue attendance with adherence to home exercises  Today, patient reports a mild "stinging" sensation at biceps musculature with a muscle soreness after working with PT  She continues to attend PT and adhere to HEP  Daughter present upon exam states she does try to carry groceries, but those that are lighter in weight  Overall, patient reports improvement in ROM from therapy  Review of Systems     Review of Systems   Constitutional: Negative for chills and fever  HENT: Negative for congestion  Respiratory: Negative for cough, chest tightness and shortness of breath  Cardiovascular: Negative for chest pain and palpitations  Gastrointestinal: Negative for abdominal pain  Endocrine: Negative for cold intolerance and heat intolerance  Musculoskeletal: Negative for arthralgias, back pain and gait problem  Neurological: Negative for syncope  Psychiatric/Behavioral: Negative for confusion         Physical Exam     /83 Pulse 97   Ht 5' 1" (1 549 m)   Wt 116 kg (256 lb 3 2 oz)   BMI 48 41 kg/m²     Right Shoulder: Active range of motion to 90-95 degrees forward flexion, 90-95 degrees abduction, lacking approximately 5-10 degrees external rotation as compared to contralateral side, and internal rotation to greater trochanter  There is no tenderness present  Empty can testing reveals improved strength without pain  There is improving strength with external rotation testing at the side  The patient is neurovascularly intact distally in the extremity  Data Review     I have personally reviewed pertinent films in PACS, and my interpretation follows  X-rays taken today 09/20/2021 of Right shoulder demonstrates unchanged, proper and stable alignment of shoulder prosthesis without evidence of loosening or lucency  Mild resorption appreciated of greater tuberosity of humerus  No significant changes as compared to previous imaging       Social History     Tobacco Use    Smoking status: Current Every Day Smoker     Packs/day: 0 50     Types: Cigarettes    Smokeless tobacco: Never Used   Vaping Use    Vaping Use: Never used   Substance Use Topics    Alcohol use: No    Drug use: No           Procedures  None    Lawrence Blackwell PA-C

## 2021-09-21 ENCOUNTER — OFFICE VISIT (OUTPATIENT)
Dept: PHYSICAL THERAPY | Facility: CLINIC | Age: 61
End: 2021-09-21
Payer: COMMERCIAL

## 2021-09-21 DIAGNOSIS — Z96.611 STATUS POST REVERSE TOTAL REPLACEMENT OF RIGHT SHOULDER: Primary | ICD-10-CM

## 2021-09-21 PROCEDURE — 97112 NEUROMUSCULAR REEDUCATION: CPT | Performed by: PHYSICAL THERAPIST

## 2021-09-21 PROCEDURE — 97110 THERAPEUTIC EXERCISES: CPT | Performed by: PHYSICAL THERAPIST

## 2021-09-21 NOTE — PROGRESS NOTES
Daily Note     Today's date: 2021  Patient name: Jayson Byrne  : 1960  MRN: [id number]  Referring provider: Nisreen Ross DO  Dx:   Encounter Diagnosis     ICD-10-CM    1  Status post reverse total replacement of right shoulder  Z96 611          Subjective: Patient reports feeling well overall  States she saw the doctor who was pleased with her progress  Objective: See treatment diary below      Assessment: Patient will continue to benefit from skilled PT to address functional limitations and strengthening  As per surgeon, continue with PT  No complaints post session  Plan: Continue per plan of care        Precautions: h/o L reverse TSA, R reverse TSA 3/3/21        Manuals 9/7 9/9 9/14 9/17 9/21    8/27 8/31 9/3   Re-eval                                                    Neuro Re-Ed             Alt iso- IR/ER at 45*  2x20"  2x20" 2x20"  2x20"    2x20"  2x20" 2x20"   Rhythmic stab at 90 deg flex 2x20" 2x20" 2x20"  2x20"    2x20"  2x20" 2x20"   PSR             scap pinches             scap depression             submax delt isometrics             Submax IR/ER isometrics             Standing cane flexion  3# 2x10  #3 2x10 #3 2x10 3# 2x10  3# x30   2# 2x10  3# 2x10  3# 2x10    sidelying shoulder abd to 90     1# 2x10  1# 2x10    2x10      sidelying shoulder ER     1# 2x10  1# 2x10    2x10  ER walkoutNV IR/ER walkout yel x10 ea    Standing cane abduction 3# 2x10  3# 2x10 #3 2x10 3# 2x10  3# x30    2# 2x10  3# 2x10  3# 2x10    TB Row Green 2x10  Green 2x10 Green 2x10 Green 2x10  Green 3x10    Green 2x10  Green 2x10  Green 2x10   TB Ext Green 2x10  Green 2x10 Green 2x10 Green 2x10  Green 3x10   Green 2x10  Green 2x10  Green 2x10   Active inf glide w RUE elevation (hi/low table)             Ther Ex             PROM per protocol Flex 3'  Flex 3' Flex 3' 5' 5'   5' 5'    RetroUBE Alt fwd/bwd x7' Alt fwd/bwd x 7'  Alt fwd/bwd x 7'  Alt fwd/bwd x 7'  Alt fwd/bwd x 7'    Alt fwd/bwd x6' Alt fwd/bwd x6'  Alt fwd/bwd x7'   Cane chest press 3# 2x10  3# 2x10 3# 2x10 3# 2x10     2# 2x10  3# 2x10  3# 2x10    Supine cane flexion 3# 2x10  #3 2x10 3# 2x10 3# 2x10     2# 2x10  3# 2x10  3# 2x10    Wall slides x25 x25 x25 x20 x20   x20 x20     Standing tricep pushdown          Green 2x10    Bicep curl 3# x30  #4 x20 #4 x20 3# x20  4# x20    3# 2x10  3# 2x10  3# x25   Tricep kickback 3# x30  #4 x 20 #4 x20 3# x20  4# x20    3# 2x10  3# 2x10  3# x25   TRX walk through 2x10  2x10 2x10 2x10  2x10    2x10 2x10 2x10   BOR 3# x30  #4x 20  3# x20 4# x20    3# 2x10  3# 2x10  3# x25   Ther Activity                                       Gait Training                                       Modalities             CP R GH 10'   10' 10'   10' 10' 10'

## 2021-09-24 ENCOUNTER — OFFICE VISIT (OUTPATIENT)
Dept: PHYSICAL THERAPY | Facility: CLINIC | Age: 61
End: 2021-09-24
Payer: COMMERCIAL

## 2021-09-24 DIAGNOSIS — Z96.611 STATUS POST REVERSE TOTAL REPLACEMENT OF RIGHT SHOULDER: Primary | ICD-10-CM

## 2021-09-24 PROCEDURE — 97112 NEUROMUSCULAR REEDUCATION: CPT | Performed by: PHYSICAL THERAPIST

## 2021-09-24 PROCEDURE — 97110 THERAPEUTIC EXERCISES: CPT | Performed by: PHYSICAL THERAPIST

## 2021-09-24 NOTE — PROGRESS NOTES
Daily Note     Today's date: 2021  Patient name: Bin Camara  : 1960  MRN: [id number]  Referring provider: Arvind Clark DO  Dx:   Encounter Diagnosis     ICD-10-CM    1  Status post reverse total replacement of right shoulder  Z96 611          Subjective: Patient states she's able to reach a cup in a cupboard now  Objective: See treatment diary below      Assessment: Patient fatigues by the end of session, but is able to progress to 30 reps of most exercises  She has no complaints post session  She will continue to benefit from strengthening, as tolerated  Plan: Continue per plan of care  Signed POC til 10/1/21        Precautions: h/o L reverse TSA, R reverse TSA 3/3/21        Manuals        Re-eval                                                    Neuro Re-Ed             Alt iso- IR/ER at 45*  2x20"  2x20" 2x20"  2x20"  2x20"       Rhythmic stab at 90 deg flex 2x20" 2x20" 2x20"  2x20"  2x20"       PSR             scap pinches             scap depression             submax delt isometrics             Submax IR/ER isometrics             Standing cane flexion  3# 2x10  #3 2x10 #3 2x10 3# 2x10  3# x30 3# x30       sidelying shoulder abd to 90     1# 2x10  1# 2x10         sidelying shoulder ER     1# 2x10  1# 2x10         Standing cane abduction 3# 2x10  3# 2x10 #3 2x10 3# 2x10  3# x30  3# x30       TB Row Green 2x10  Green 2x10 Green 2x10 Green 2x10  Green 3x10  Green 3x10        TB Ext Green 2x10  Green 2x10 Green 2x10 Green 2x10  Green 3x10 Green 3x10        Active inf glide w RUE elevation (hi/low table)             Ther Ex             PROM per protocol Flex 3'  Flex 3' Flex 3' 5' 5' 5'       RetroUBE Alt fwd/bwd x7' Alt fwd/bwd x 7'  Alt fwd/bwd x 7'  Alt fwd/bwd x 7'  Alt fwd/bwd x 7'  Alt fwd/bwd x 7'        Cane chest press 3# 2x10  3# 2x10 3# 2x10 3# 2x10   3# x30       Supine cane flexion 3# 2x10  #3 2x10 3# 2x10 3# 2x10   3# x30 Wall slides x25 x25 x25 x20 x20        Standing tricep pushdown      Green x30       Bicep curl 3# x30  #4 x20 #4 x20 3# x20  4# x20  4# x30       Tricep kickback 3# x30  #4 x 20 #4 x20 3# x20  4# x20  4# x30       TRX walk through 2x10  2x10 2x10 2x10  2x10         BOR 3# x30  #4x 20  3# x20 4# x20  4# x30       Ther Activity                                       Gait Training                                       Modalities             CP R GH 10'   10' 10' 10'

## 2021-09-27 ENCOUNTER — OFFICE VISIT (OUTPATIENT)
Dept: PHYSICAL THERAPY | Facility: CLINIC | Age: 61
End: 2021-09-27
Payer: COMMERCIAL

## 2021-09-27 DIAGNOSIS — Z96.611 STATUS POST REVERSE TOTAL REPLACEMENT OF RIGHT SHOULDER: Primary | ICD-10-CM

## 2021-09-27 PROCEDURE — 97110 THERAPEUTIC EXERCISES: CPT | Performed by: PHYSICAL THERAPIST

## 2021-09-27 PROCEDURE — 97112 NEUROMUSCULAR REEDUCATION: CPT | Performed by: PHYSICAL THERAPIST

## 2021-09-27 NOTE — PROGRESS NOTES
Daily Note     Today's date: 2021  Patient name: Neida Redman  : 1960  MRN: [id number]  Referring provider: Heriberto Fenton DO  Dx:   Encounter Diagnosis     ICD-10-CM    1  Status post reverse total replacement of right shoulder  Z96 611          Subjective: Patient states she continues to get better  Objective: See treatment diary below      Assessment: Patient continues to be able to progress exercise program   Today she adds supine alphabet and unilateral chest press with dumbbell  No complaints of pain post session; trial without ice today  Plan: Continue per plan of care  Signed POC til 10/1/21        Precautions: h/o L reverse TSA, R reverse TSA 3/3/21        Manuals       Re-eval                                                    Neuro Re-Ed             Alt iso- IR/ER at 45*  2x20"  2x20" 2x20"  2x20"  2x20" 2x20"      Rhythmic stab at 90 deg flex 2x20" 2x20" 2x20"  2x20"  2x20" 2x20"      PSR             scap pinches             scap depression             submax delt isometrics             Standing flexion/abduction to 90*       2x10 ea       Standing cane flexion  3# 2x10  #3 2x10 #3 2x10 3# 2x10  3# x30 3# x30 End range- 0# x20       sidelying shoulder abd to 90     1# 2x10  1# 2x10   2# 3x10       sidelying shoulder ER     1# 2x10  1# 2x10   2# 3x10       Standing cane abduction 3# 2x10  3# 2x10 #3 2x10 3# 2x10  3# x30  3# x30       TB Row Green 2x10  Green 2x10 Green 2x10 Green 2x10  Green 3x10  Green 3x10  Green 3x10       TB Ext Green 2x10  Green 2x10 Green 2x10 Green 2x10  Green 3x10 Green 3x10  Green 3x10       Supine alphabet       2# x1       Ther Ex             PROM per protocol Flex 3'  Flex 3' Flex 3' 5' 5' 5' 5'      RetroUBE Alt fwd/bwd x7' Alt fwd/bwd x 7'  Alt fwd/bwd x 7'  Alt fwd/bwd x 7'  Alt fwd/bwd x 7'  Alt fwd/bwd x 7'  Alt fwd/bwd x 7'       Cane chest press 3# 2x10  3# 2x10 3# 2x10 3# 2x10   3# x30 unilateral 2# 2x10       Supine cane flexion 3# 2x10  #3 2x10 3# 2x10 3# 2x10   3# x30       Wall slides x25 x25 x25 x20 x20  x30       Standing tricep pushdown      Green x30 Green 3x10       Bicep curl 3# x30  #4 x20 #4 x20 3# x20  4# x20  4# x30 4# 3x10       Tricep kickback 3# x30  #4 x 20 #4 x20 3# x20  4# x20  4# x30 4# 3x10       TRX walk through 2x10  2x10 2x10 2x10  2x10   3x10       BOR 3# x30  #4x 20  3# x20 4# x20  4# x30 4# 3x10       Ther Activity                                       Gait Training                                       Modalities             CP R GH 10'   10' 10' 10' D/C no

## 2021-09-30 ENCOUNTER — OFFICE VISIT (OUTPATIENT)
Dept: PHYSICAL THERAPY | Facility: CLINIC | Age: 61
End: 2021-09-30
Payer: COMMERCIAL

## 2021-09-30 DIAGNOSIS — Z96.611 STATUS POST REVERSE TOTAL REPLACEMENT OF RIGHT SHOULDER: Primary | ICD-10-CM

## 2021-09-30 PROCEDURE — 97112 NEUROMUSCULAR REEDUCATION: CPT | Performed by: PHYSICAL THERAPIST

## 2021-09-30 PROCEDURE — 97110 THERAPEUTIC EXERCISES: CPT | Performed by: PHYSICAL THERAPIST

## 2021-09-30 NOTE — PROGRESS NOTES
Daily Note     Today's date: 2021  Patient name: Marti Meehan  : 1960  MRN: [id number]  Referring provider: Jax Grant DO  Dx:   Encounter Diagnosis     ICD-10-CM    1  Status post reverse total replacement of right shoulder  Z96 611          Subjective: Patient states she had no increased pain without having ice at the end of last visit  Objective: See treatment diary below      Assessment: Patient challenged with supine alphabet  She fatigues post session, but no ice required post session  She continues to exhibit improved shoulder ROM and strength  Plan: Continue per plan of care  Signed POC til 10/1/21        Precautions: h/o L reverse TSA, R reverse TSA 3/3/21        Manuals       Re-eval                                                    Neuro Re-Ed             Alt iso- IR/ER at 45*  2x20"  2x20" 2x20"  2x20"  2x20" 2x20" 2x20"      Rhythmic stab at 90 deg flex 2x20" 2x20" 2x20"  2x20"  2x20" 2x20" 2x20"      PSR             scap pinches             scap depression             submax delt isometrics             Standing flexion/abduction to 90*       2x10 ea  2x10 ea     Standing cane flexion  3# 2x10  #3 2x10 #3 2x10 3# 2x10  3# x30 3# x30 End range- 0# x20  End range- 0# 2x10      sidelying shoulder abd to 90     1# 2x10  1# 2x10   2# 3x10  2# 3x10      sidelying shoulder ER     1# 2x10  1# 2x10   2# 3x10  2# 3x10      Standing cane abduction 3# 2x10  3# 2x10 #3 2x10 3# 2x10  3# x30  3# x30       TB Row Green 2x10  Green 2x10 Green 2x10 Green 2x10  Green 3x10  Green 3x10  Green 3x10  Green 3x10      TB Ext Green 2x10  Green 2x10 Green 2x10 Green 2x10  Green 3x10 Green 3x10  Green 3x10  Green 3x10      Supine alphabet       2# x1  2# x1      Ther Ex             PROM per protocol Flex 3'  Flex 3' Flex 3' 5' 5' 5' 5' 3'      RetroUBE Alt fwd/bwd x7' Alt fwd/bwd x 7'  Alt fwd/bwd x 7'  Alt fwd/bwd x 7'  Alt fwd/bwd x 7'  Alt fwd/bwd x 7'  Alt fwd/bwd x 7'  Alt fwd/bwd x 7'      Cane chest press 3# 2x10  3# 2x10 3# 2x10 3# 2x10   3# x30 unilateral 2# 2x10  unilateral 2# 2x10      Supine cane flexion 3# 2x10  #3 2x10 3# 2x10 3# 2x10   3# x30       Wall slides x25 x25 x25 x20 x20  x30       Standing tricep pushdown      Green x30 Green 3x10  Green 3x10      Bicep curl 3# x30  #4 x20 #4 x20 3# x20  4# x20  4# x30 4# 3x10  4# 3x10     Tricep kickback 3# x30  #4 x 20 #4 x20 3# x20  4# x20  4# x30 4# 3x10  4# 3x10     TRX walk through 2x10  2x10 2x10 2x10  2x10   3x10  3x10      BOR 3# x30  #4x 20  3# x20 4# x20  4# x30 4# 3x10  4# 3x10     Ther Activity                                       Gait Training                                       Modalities             CP R GH 10'   10' 10' 10' D/C

## 2021-10-05 ENCOUNTER — OFFICE VISIT (OUTPATIENT)
Dept: PHYSICAL THERAPY | Facility: CLINIC | Age: 61
End: 2021-10-05
Payer: COMMERCIAL

## 2021-10-05 DIAGNOSIS — Z96.611 STATUS POST REVERSE TOTAL REPLACEMENT OF RIGHT SHOULDER: Primary | ICD-10-CM

## 2021-10-05 PROCEDURE — 97112 NEUROMUSCULAR REEDUCATION: CPT | Performed by: PHYSICAL THERAPIST

## 2021-10-05 PROCEDURE — 97110 THERAPEUTIC EXERCISES: CPT | Performed by: PHYSICAL THERAPIST

## 2021-10-08 ENCOUNTER — OFFICE VISIT (OUTPATIENT)
Dept: PHYSICAL THERAPY | Facility: CLINIC | Age: 61
End: 2021-10-08
Payer: COMMERCIAL

## 2021-10-08 DIAGNOSIS — Z96.611 STATUS POST REVERSE TOTAL REPLACEMENT OF RIGHT SHOULDER: Primary | ICD-10-CM

## 2021-10-08 PROCEDURE — 97110 THERAPEUTIC EXERCISES: CPT | Performed by: PHYSICAL THERAPIST

## 2021-10-08 PROCEDURE — 97112 NEUROMUSCULAR REEDUCATION: CPT | Performed by: PHYSICAL THERAPIST

## 2021-10-12 ENCOUNTER — OFFICE VISIT (OUTPATIENT)
Dept: PHYSICAL THERAPY | Facility: CLINIC | Age: 61
End: 2021-10-12
Payer: COMMERCIAL

## 2021-10-12 DIAGNOSIS — Z96.611 STATUS POST REVERSE TOTAL REPLACEMENT OF RIGHT SHOULDER: Primary | ICD-10-CM

## 2021-10-12 PROCEDURE — 97112 NEUROMUSCULAR REEDUCATION: CPT | Performed by: PHYSICAL THERAPIST

## 2021-10-12 PROCEDURE — 97110 THERAPEUTIC EXERCISES: CPT | Performed by: PHYSICAL THERAPIST

## 2021-10-15 ENCOUNTER — APPOINTMENT (OUTPATIENT)
Dept: PHYSICAL THERAPY | Facility: CLINIC | Age: 61
End: 2021-10-15
Payer: COMMERCIAL

## 2021-10-19 ENCOUNTER — APPOINTMENT (OUTPATIENT)
Dept: PHYSICAL THERAPY | Facility: CLINIC | Age: 61
End: 2021-10-19
Payer: COMMERCIAL

## 2021-10-20 ENCOUNTER — OFFICE VISIT (OUTPATIENT)
Dept: PHYSICAL THERAPY | Facility: CLINIC | Age: 61
End: 2021-10-20
Payer: COMMERCIAL

## 2021-10-20 DIAGNOSIS — Z96.611 STATUS POST REVERSE TOTAL REPLACEMENT OF RIGHT SHOULDER: Primary | ICD-10-CM

## 2021-10-20 PROCEDURE — 97112 NEUROMUSCULAR REEDUCATION: CPT

## 2021-10-20 PROCEDURE — 97110 THERAPEUTIC EXERCISES: CPT | Performed by: PHYSICAL THERAPIST

## 2021-10-21 ENCOUNTER — VBI (OUTPATIENT)
Dept: ADMINISTRATIVE | Facility: OTHER | Age: 61
End: 2021-10-21

## 2021-10-22 ENCOUNTER — OFFICE VISIT (OUTPATIENT)
Dept: PHYSICAL THERAPY | Facility: CLINIC | Age: 61
End: 2021-10-22
Payer: COMMERCIAL

## 2021-10-22 DIAGNOSIS — Z96.611 STATUS POST REVERSE TOTAL REPLACEMENT OF RIGHT SHOULDER: Primary | ICD-10-CM

## 2021-10-22 PROCEDURE — 97110 THERAPEUTIC EXERCISES: CPT | Performed by: PHYSICAL THERAPIST

## 2021-10-22 PROCEDURE — 97112 NEUROMUSCULAR REEDUCATION: CPT | Performed by: PHYSICAL THERAPIST

## 2021-10-26 ENCOUNTER — OFFICE VISIT (OUTPATIENT)
Dept: PHYSICAL THERAPY | Facility: CLINIC | Age: 61
End: 2021-10-26
Payer: COMMERCIAL

## 2021-10-26 DIAGNOSIS — Z96.611 STATUS POST REVERSE TOTAL REPLACEMENT OF RIGHT SHOULDER: Primary | ICD-10-CM

## 2021-10-26 PROCEDURE — 97530 THERAPEUTIC ACTIVITIES: CPT | Performed by: PHYSICAL THERAPIST

## 2021-10-26 PROCEDURE — 97110 THERAPEUTIC EXERCISES: CPT | Performed by: PHYSICAL THERAPIST

## 2021-10-26 PROCEDURE — 97112 NEUROMUSCULAR REEDUCATION: CPT | Performed by: PHYSICAL THERAPIST

## 2021-10-29 ENCOUNTER — OFFICE VISIT (OUTPATIENT)
Dept: PHYSICAL THERAPY | Facility: CLINIC | Age: 61
End: 2021-10-29
Payer: COMMERCIAL

## 2021-10-29 DIAGNOSIS — Z96.611 STATUS POST REVERSE TOTAL REPLACEMENT OF RIGHT SHOULDER: Primary | ICD-10-CM

## 2021-10-29 PROCEDURE — 97110 THERAPEUTIC EXERCISES: CPT | Performed by: PHYSICAL THERAPIST

## 2021-10-29 PROCEDURE — 97112 NEUROMUSCULAR REEDUCATION: CPT | Performed by: PHYSICAL THERAPIST

## 2021-11-01 ENCOUNTER — TELEPHONE (OUTPATIENT)
Dept: HEMATOLOGY ONCOLOGY | Facility: CLINIC | Age: 61
End: 2021-11-01

## 2021-11-01 ENCOUNTER — EVALUATION (OUTPATIENT)
Dept: PHYSICAL THERAPY | Facility: CLINIC | Age: 61
End: 2021-11-01
Payer: COMMERCIAL

## 2021-11-01 DIAGNOSIS — Z96.611 STATUS POST REVERSE TOTAL REPLACEMENT OF RIGHT SHOULDER: Primary | ICD-10-CM

## 2021-11-01 PROCEDURE — 97112 NEUROMUSCULAR REEDUCATION: CPT | Performed by: PHYSICAL THERAPIST

## 2021-11-01 PROCEDURE — 97110 THERAPEUTIC EXERCISES: CPT | Performed by: PHYSICAL THERAPIST

## 2021-11-03 ENCOUNTER — TELEPHONE (OUTPATIENT)
Dept: HEMATOLOGY ONCOLOGY | Facility: CLINIC | Age: 61
End: 2021-11-03

## 2021-11-04 ENCOUNTER — OFFICE VISIT (OUTPATIENT)
Dept: PHYSICAL THERAPY | Facility: CLINIC | Age: 61
End: 2021-11-04
Payer: COMMERCIAL

## 2021-11-04 DIAGNOSIS — Z96.611 STATUS POST REVERSE TOTAL REPLACEMENT OF RIGHT SHOULDER: Primary | ICD-10-CM

## 2021-11-04 PROCEDURE — 97112 NEUROMUSCULAR REEDUCATION: CPT | Performed by: PHYSICAL THERAPIST

## 2021-11-04 PROCEDURE — 97110 THERAPEUTIC EXERCISES: CPT | Performed by: PHYSICAL THERAPIST

## 2021-11-08 ENCOUNTER — OFFICE VISIT (OUTPATIENT)
Dept: PHYSICAL THERAPY | Facility: CLINIC | Age: 61
End: 2021-11-08
Payer: COMMERCIAL

## 2021-11-08 DIAGNOSIS — Z96.611 STATUS POST REVERSE TOTAL REPLACEMENT OF RIGHT SHOULDER: Primary | ICD-10-CM

## 2021-11-08 PROCEDURE — 97112 NEUROMUSCULAR REEDUCATION: CPT | Performed by: PHYSICAL THERAPIST

## 2021-11-08 PROCEDURE — 97110 THERAPEUTIC EXERCISES: CPT | Performed by: PHYSICAL THERAPIST

## 2021-11-10 ENCOUNTER — OFFICE VISIT (OUTPATIENT)
Dept: PHYSICAL THERAPY | Facility: CLINIC | Age: 61
End: 2021-11-10
Payer: COMMERCIAL

## 2021-11-10 DIAGNOSIS — Z96.611 STATUS POST REVERSE TOTAL REPLACEMENT OF RIGHT SHOULDER: Primary | ICD-10-CM

## 2021-11-10 PROCEDURE — 97112 NEUROMUSCULAR REEDUCATION: CPT | Performed by: PHYSICAL THERAPIST

## 2021-11-10 PROCEDURE — 97110 THERAPEUTIC EXERCISES: CPT | Performed by: PHYSICAL THERAPIST

## 2021-11-16 ENCOUNTER — OFFICE VISIT (OUTPATIENT)
Dept: PHYSICAL THERAPY | Facility: CLINIC | Age: 61
End: 2021-11-16
Payer: COMMERCIAL

## 2021-11-16 DIAGNOSIS — Z96.611 STATUS POST REVERSE TOTAL REPLACEMENT OF RIGHT SHOULDER: Primary | ICD-10-CM

## 2021-11-16 PROCEDURE — 97112 NEUROMUSCULAR REEDUCATION: CPT | Performed by: PHYSICAL THERAPIST

## 2021-11-16 PROCEDURE — 97110 THERAPEUTIC EXERCISES: CPT | Performed by: PHYSICAL THERAPIST

## 2021-11-17 ENCOUNTER — VBI (OUTPATIENT)
Dept: ADMINISTRATIVE | Facility: OTHER | Age: 61
End: 2021-11-17

## 2021-11-19 ENCOUNTER — OFFICE VISIT (OUTPATIENT)
Dept: PHYSICAL THERAPY | Facility: CLINIC | Age: 61
End: 2021-11-19
Payer: COMMERCIAL

## 2021-11-19 DIAGNOSIS — Z96.611 STATUS POST REVERSE TOTAL REPLACEMENT OF RIGHT SHOULDER: Primary | ICD-10-CM

## 2021-11-19 PROCEDURE — 97110 THERAPEUTIC EXERCISES: CPT | Performed by: PHYSICAL THERAPIST

## 2021-11-19 PROCEDURE — 97112 NEUROMUSCULAR REEDUCATION: CPT | Performed by: PHYSICAL THERAPIST

## 2021-11-23 ENCOUNTER — OFFICE VISIT (OUTPATIENT)
Dept: PHYSICAL THERAPY | Facility: CLINIC | Age: 61
End: 2021-11-23
Payer: COMMERCIAL

## 2021-11-23 DIAGNOSIS — Z96.611 STATUS POST REVERSE TOTAL REPLACEMENT OF RIGHT SHOULDER: Primary | ICD-10-CM

## 2021-11-23 PROCEDURE — 97112 NEUROMUSCULAR REEDUCATION: CPT | Performed by: PHYSICAL THERAPIST

## 2021-11-23 PROCEDURE — 97110 THERAPEUTIC EXERCISES: CPT | Performed by: PHYSICAL THERAPIST

## 2021-11-26 ENCOUNTER — OFFICE VISIT (OUTPATIENT)
Dept: PHYSICAL THERAPY | Facility: CLINIC | Age: 61
End: 2021-11-26
Payer: COMMERCIAL

## 2021-11-26 DIAGNOSIS — Z96.611 STATUS POST REVERSE TOTAL REPLACEMENT OF RIGHT SHOULDER: Primary | ICD-10-CM

## 2021-11-26 PROCEDURE — 97110 THERAPEUTIC EXERCISES: CPT

## 2021-11-26 PROCEDURE — 97112 NEUROMUSCULAR REEDUCATION: CPT

## 2021-11-30 ENCOUNTER — OFFICE VISIT (OUTPATIENT)
Dept: PHYSICAL THERAPY | Facility: CLINIC | Age: 61
End: 2021-11-30
Payer: COMMERCIAL

## 2021-11-30 DIAGNOSIS — Z96.611 STATUS POST REVERSE TOTAL REPLACEMENT OF RIGHT SHOULDER: Primary | ICD-10-CM

## 2021-11-30 PROCEDURE — 97110 THERAPEUTIC EXERCISES: CPT | Performed by: PHYSICAL THERAPIST

## 2021-11-30 PROCEDURE — 97112 NEUROMUSCULAR REEDUCATION: CPT | Performed by: PHYSICAL THERAPIST

## 2022-09-09 ENCOUNTER — VBI (OUTPATIENT)
Dept: ADMINISTRATIVE | Facility: OTHER | Age: 62
End: 2022-09-09

## 2022-11-06 NOTE — PROGRESS NOTES
Daily Note     Today's date: 2021  Patient name: Pati Sanchez  : 1960  MRN: [id number]  Referring provider: Carmen Lowery DO  Dx:   Encounter Diagnosis     ICD-10-CM    1  Status post reverse total replacement of right shoulder  [id number] [id number]                   Subjective:  Patient states she's been swimming in the pool, which has been helping her shoulder  Objective: See treatment diary below      Assessment: Tolerated treatment well  Patient is able to elevate upper extremity with improved ability  Scapular compensatory patterns present, but she exhibits more functional AROM of right shoulder  Plan: Continue per plan of care        Precautions: h/o L reverse TSA, R reverse TSA 3/3/21      Manuals 7/29 8/4 8/6 8/10  8/13   7/19 7/23 7/26   Re-eval          KS                                          Neuro Re-Ed             Alt iso- IR/ER at 45*  x20 sec x20 sec x20 sec x20 sec x20 sec     x20 sec   Rhythmic stab at 90 deg flex x20 sec x20 sec x20 sec x20 sec x20 sec      x20 sec   PSR             scap pinches             scap depression             submax delt isometrics             Submax IR/ER isometrics             Standing cane flexion  1# 2x10   2# 2x10  2# 2x10    standing cane 2x10  Standing cane 2x10  1# standing cane 2x10    sidelying shoulder abd to 90      2x10    2x10  2x10     sidelying shoulder ER      2x10    2x10  2x10     Standing cane abduction 1# 2x10    2# 2x10  2# 2x10    Cane 2x10  Cane 2x10 ea  1# cane 2x10 ea    TB Row Red 2x10  Green 2x10  Green 2x10  Green 2x10  Green 2x10    Red 2x10  Red 2x10  Red 2x10    TB Ext Red 2x10  Green 2x10  Green 2x10  Green 2x10  Green 2x10    Red 2x10  Red 2x10  Red 2x10    Active inf glide w RUE elevation (hi/low table)        NV 2x10     Ther Ex             PROM per protocol 5' 5' 5' 5' 5'   5' 5' 8'   RetroUBE Alt fwd/bwd x6' Alt fwd/bwd x6'  Alt fwd/bwd x6'  Alt fwd/bwd x6'  Alt fwd/bwd x6'    3' 5' 6 5'   Cane chest press 2# 2x10    2# 2x10  2# 2x10         Supine cane flexion    2# 2x10  2# 2x10         Finger ladder AAROM x20 2# x20       1# x20  x20 1# 2x10    Standing tricep pushdown         Red 2x10  Red 2x10    Bicep curl 2# 2x10 2# 2x10  3# 2x10  3# 2x10  3# 2x10    2# 2x10  2# 2x10  2# 2x10    Tricep kickback 2# 2x10 2# 2x10  3# 2x10  3# 2x10  3# 2x10    2# 2x10  2# 2x10  2# 2x10    TRX walk through 2x10  2x10  2x10  2x10  2x10    2x10 2x10  2x10    BOR  2# 2x10  3# 2x10  3# 2x10  3# 2x10         Ther Activity                                       Gait Training                                       Modalities             CP R GH 10     10'   10' 10' 10' DISCHARGE

## 2023-01-16 ENCOUNTER — VBI (OUTPATIENT)
Dept: ADMINISTRATIVE | Facility: OTHER | Age: 63
End: 2023-01-16

## 2024-02-07 ENCOUNTER — VBI (OUTPATIENT)
Dept: ADMINISTRATIVE | Facility: OTHER | Age: 64
End: 2024-02-07

## 2024-07-18 ENCOUNTER — VBI (OUTPATIENT)
Dept: ADMINISTRATIVE | Facility: OTHER | Age: 64
End: 2024-07-18

## 2024-07-18 NOTE — TELEPHONE ENCOUNTER
07/18/24 3:05 PM     Chart reviewed for CRC: Colonoscopy ; nothing is submitted to the patient's insurance at this time.     Annabelle Leahy [facility]

## 2024-07-18 NOTE — TELEPHONE ENCOUNTER
07/18/24 3:03 PM     Chart reviewed for Mammogram ; nothing is submitted to the patient's insurance at this time.     Annabelle Leahy   [facility]

## 2024-08-20 ENCOUNTER — OFFICE VISIT (OUTPATIENT)
Age: 64
End: 2024-08-20
Payer: COMMERCIAL

## 2024-08-20 VITALS
OXYGEN SATURATION: 97 % | HEART RATE: 82 BPM | TEMPERATURE: 95.7 F | RESPIRATION RATE: 18 BRPM | BODY MASS INDEX: 48.33 KG/M2 | WEIGHT: 255.8 LBS | DIASTOLIC BLOOD PRESSURE: 76 MMHG | SYSTOLIC BLOOD PRESSURE: 152 MMHG

## 2024-08-20 DIAGNOSIS — R53.83 OTHER FATIGUE: Primary | ICD-10-CM

## 2024-08-20 PROCEDURE — S9083 URGENT CARE CENTER GLOBAL: HCPCS | Performed by: PHYSICIAN ASSISTANT

## 2024-08-20 PROCEDURE — 99213 OFFICE O/P EST LOW 20 MIN: CPT | Performed by: PHYSICIAN ASSISTANT

## 2024-08-20 NOTE — PROGRESS NOTES
[facility]        NAME: Heidi Pate is a 63 y.o. female  : 1960    MRN: [id number]  DATE: 2024  TIME: 2:42 PM    Assessment and Plan   Other fatigue [R53.83]  1. Other fatigue              Patient Instructions     Evaluation for hypertension  Follow up with PCP in 3-5 days.  Proceed to  ER if symptoms worsen.    Chief Complaint     Chief Complaint   Patient presents with    Hypertension     Seeking blood pressure check. Was seen by home care personnel and found to be hypertensive and was referred to urgent care for additional assessment         History of Present Illness       63-year-old female who presents complaining of having been evaluated by home health care aides and found to have high blood pressure.  The aides refer her over to the urgent care due to the high blood pressure readings.  Patient denies chest pain, shortness of breath, nausea, vomiting, diaphoresis.    Hypertension  Pertinent negatives include no chest pain, palpitations or shortness of breath.       Review of Systems   Review of Systems   Constitutional: Negative.    HENT: Negative.     Eyes: Negative.    Respiratory: Negative.  Negative for cough, chest tightness, shortness of breath, wheezing and stridor.    Cardiovascular: Negative.  Negative for chest pain, palpitations and leg swelling.         Current Medications       Current Outpatient Medications:     acetaminophen (TYLENOL) 325 mg tablet, Take 975 mg by mouth every 6 (six) hours as needed for mild pain (last dose 8pm last night), Disp: , Rfl:     ferrous gluconate (FERGON) 240 (27 FE) MG tablet, Take 1 tablet (240 mg total) by mouth daily (Patient not taking: Reported on 2021), Disp: 30 tablet, Rfl: 1    oxyCODONE-acetaminophen (PERCOCET) 5-325 mg per tablet, Take 1 tablet by mouth every 4 (four) hours as needed for moderate pain or severe painMax Daily Amount: 6 tablets (Patient not taking: Reported on 2021), Disp: 20 tablet, Rfl:  0    VITAMIN D, CHOLECALCIFEROL, PO, Take 1 tablet by mouth once a week (Patient not taking: Reported on 8/20/2024), Disp: , Rfl:     Current Allergies     Allergies as of 08/20/2024    (No Known Allergies)            The following portions of the patient's history were reviewed and updated as appropriate: allergies, current medications, past family history, past medical history, past social history, past surgical history and problem list.     Past Medical History:   Diagnosis Date    Shoulder fracture        Past Surgical History:   Procedure Laterality Date    CHOLECYSTECTOMY      FOREIGN BODY REMOVAL Left     foot    [place] RECONSTR TOTAL SHOULDER IMPLANT Right 3/3/2021    Procedure: ARTHROPLASTY SHOULDER REVERSE;  Surgeon: Michael Caor DO;  Location: [facility];  Service: Orthopedics    SHOULDER SURGERY Left        Family History   Problem Relation Age of Onset    No Known Problems Mother     No Known Problems Father          Medications have been verified.        Objective   /76 (BP Location: Right arm, Patient Position: Sitting, Cuff Size: Large)   Pulse 82   Temp (!) 95.7 °F (35.4 °C) (Tympanic)   Resp 18   Wt 116 kg (255 lb 12.8 oz)   SpO2 97%   BMI 48.33 kg/m²        Physical Exam     Physical Exam  Constitutional:       Appearance: Normal appearance. She is well-developed.   HENT:      Head: Normocephalic and atraumatic.      Right Ear: External ear normal.      Left Ear: External ear normal.      Nose: Nose normal.      Mouth/Throat:      Mouth: Oropharynx is clear and moist.      Pharynx: No oropharyngeal exudate.   Cardiovascular:      Rate and Rhythm: Normal rate and regular rhythm.      Heart sounds: Normal heart sounds.   Pulmonary:      Effort: Pulmonary effort is normal. No respiratory distress.      Breath sounds: Normal breath sounds. No wheezing or rales.   Chest:      Chest wall: No tenderness.   Abdominal:      General: Bowel sounds are normal. There is no distension.       Palpations: Abdomen is soft. There is no mass.      Tenderness: There is no abdominal tenderness. There is no guarding or rebound.   Musculoskeletal:      Cervical back: Normal range of motion and neck supple.   Lymphadenopathy:      Cervical: No cervical adenopathy.   Neurological:      Mental Status: She is alert.           Blood pressure was found to be 152/76 in the office with an asymptomatic patient

## 2024-08-24 ENCOUNTER — APPOINTMENT (EMERGENCY)
Dept: CT IMAGING | Facility: HOSPITAL | Age: 64
DRG: 074 | End: 2024-08-24
Payer: COMMERCIAL

## 2024-08-24 ENCOUNTER — HOSPITAL ENCOUNTER (INPATIENT)
Facility: HOSPITAL | Age: 64
LOS: 1 days | Discharge: HOME HEALTH | DRG: 074 | End: 2024-08-26
Attending: EMERGENCY MEDICINE | Admitting: FAMILY MEDICINE
Payer: COMMERCIAL

## 2024-08-24 DIAGNOSIS — Z86.73 ARTERIAL ISCHEMIC STROKE, CHRONIC: ICD-10-CM

## 2024-08-24 DIAGNOSIS — R29.810 FACIAL DROOP: ICD-10-CM

## 2024-08-24 DIAGNOSIS — G51.0 BELL'S PALSY: Primary | ICD-10-CM

## 2024-08-24 DIAGNOSIS — I10 UNCONTROLLED HYPERTENSION: ICD-10-CM

## 2024-08-24 PROBLEM — I16.0 HYPERTENSIVE URGENCY: Status: ACTIVE | Noted: 2024-08-24

## 2024-08-24 PROBLEM — R65.10 SIRS (SYSTEMIC INFLAMMATORY RESPONSE SYNDROME) (HCC): Status: ACTIVE | Noted: 2024-08-24

## 2024-08-24 LAB
ANION GAP SERPL CALCULATED.3IONS-SCNC: 9 MMOL/L (ref 4–13)
BASOPHILS # BLD MANUAL: 0 THOUSAND/UL (ref 0–0.1)
BASOPHILS NFR MAR MANUAL: 0 % (ref 0–1)
BUN SERPL-MCNC: 16 MG/DL (ref 5–25)
CALCIUM SERPL-MCNC: 9.4 MG/DL (ref 8.4–10.2)
CARDIAC TROPONIN I PNL SERPL HS: 6 NG/L (ref 8–18)
CHLORIDE SERPL-SCNC: 103 MMOL/L (ref 96–108)
CHOLEST SERPL-MCNC: 220 MG/DL
CO2 SERPL-SCNC: 25 MMOL/L (ref 21–32)
CREAT SERPL-MCNC: 0.76 MG/DL (ref 0.6–1.3)
EOSINOPHIL # BLD MANUAL: 0 THOUSAND/UL (ref 0–0.4)
EOSINOPHIL NFR BLD MANUAL: 0 % (ref 0–6)
ERYTHROCYTE [DISTWIDTH] IN BLOOD BY AUTOMATED COUNT: 17.9 % (ref 11.6–15.1)
GFR SERPL CREATININE-BSD FRML MDRD: 84 ML/MIN/1.73SQ M
GLUCOSE SERPL-MCNC: 92 MG/DL (ref 65–140)
GLUCOSE SERPL-MCNC: 95 MG/DL (ref 65–140)
HCT VFR BLD AUTO: 44.3 % (ref 34.8–46.1)
HDLC SERPL-MCNC: 51 MG/DL
HGB BLD-MCNC: 13 G/DL (ref 11.5–15.4)
LDLC SERPL CALC-MCNC: 142 MG/DL (ref 0–100)
LYMPHOCYTES # BLD AUTO: 3.66 THOUSAND/UL (ref 0.6–4.47)
LYMPHOCYTES # BLD AUTO: 32 % (ref 14–44)
MCH RBC QN AUTO: 23 PG (ref 26.8–34.3)
MCHC RBC AUTO-ENTMCNC: 29.3 G/DL (ref 31.4–37.4)
MCV RBC AUTO: 78 FL (ref 82–98)
MICROCYTES BLD QL AUTO: PRESENT
MONOCYTES # BLD AUTO: 0.54 THOUSAND/UL (ref 0–1.22)
MONOCYTES NFR BLD: 5 % (ref 4–12)
NEUTROPHILS # BLD MANUAL: 6.56 THOUSAND/UL (ref 1.85–7.62)
NEUTS SEG NFR BLD AUTO: 61 % (ref 43–75)
PLATELET # BLD AUTO: 384 THOUSANDS/UL (ref 149–390)
PLATELET BLD QL SMEAR: ADEQUATE
PMV BLD AUTO: 9.5 FL (ref 8.9–12.7)
POLYCHROMASIA BLD QL SMEAR: PRESENT
POTASSIUM SERPL-SCNC: 4.8 MMOL/L (ref 3.5–5.3)
RBC # BLD AUTO: 5.65 MILLION/UL (ref 3.81–5.12)
RBC MORPH BLD: PRESENT
SODIUM SERPL-SCNC: 137 MMOL/L (ref 135–147)
TRIGL SERPL-MCNC: 136 MG/DL
VARIANT LYMPHS # BLD AUTO: 2 %
WBC # BLD AUTO: 10.76 THOUSAND/UL (ref 4.31–10.16)

## 2024-08-24 PROCEDURE — 86618 LYME DISEASE ANTIBODY: CPT | Performed by: EMERGENCY MEDICINE

## 2024-08-24 PROCEDURE — 70450 CT HEAD/BRAIN W/O DYE: CPT

## 2024-08-24 PROCEDURE — 84484 ASSAY OF TROPONIN QUANT: CPT | Performed by: EMERGENCY MEDICINE

## 2024-08-24 PROCEDURE — 83036 HEMOGLOBIN GLYCOSYLATED A1C: CPT | Performed by: STUDENT IN AN ORGANIZED HEALTH CARE EDUCATION/TRAINING PROGRAM

## 2024-08-24 PROCEDURE — 96375 TX/PRO/DX INJ NEW DRUG ADDON: CPT

## 2024-08-24 PROCEDURE — 96374 THER/PROPH/DIAG INJ IV PUSH: CPT

## 2024-08-24 PROCEDURE — 82948 REAGENT STRIP/BLOOD GLUCOSE: CPT

## 2024-08-24 PROCEDURE — 99285 EMERGENCY DEPT VISIT HI MDM: CPT | Performed by: EMERGENCY MEDICINE

## 2024-08-24 PROCEDURE — 85027 COMPLETE CBC AUTOMATED: CPT | Performed by: EMERGENCY MEDICINE

## 2024-08-24 PROCEDURE — 85007 BL SMEAR W/DIFF WBC COUNT: CPT | Performed by: EMERGENCY MEDICINE

## 2024-08-24 PROCEDURE — 99223 1ST HOSP IP/OBS HIGH 75: CPT | Performed by: STUDENT IN AN ORGANIZED HEALTH CARE EDUCATION/TRAINING PROGRAM

## 2024-08-24 PROCEDURE — 93005 ELECTROCARDIOGRAM TRACING: CPT

## 2024-08-24 PROCEDURE — 80061 LIPID PANEL: CPT | Performed by: STUDENT IN AN ORGANIZED HEALTH CARE EDUCATION/TRAINING PROGRAM

## 2024-08-24 PROCEDURE — 99284 EMERGENCY DEPT VISIT MOD MDM: CPT

## 2024-08-24 PROCEDURE — 80048 BASIC METABOLIC PNL TOTAL CA: CPT | Performed by: EMERGENCY MEDICINE

## 2024-08-24 PROCEDURE — 36415 COLL VENOUS BLD VENIPUNCTURE: CPT | Performed by: EMERGENCY MEDICINE

## 2024-08-24 RX ORDER — ONDANSETRON 2 MG/ML
4 INJECTION INTRAMUSCULAR; INTRAVENOUS EVERY 6 HOURS PRN
Status: DISCONTINUED | OUTPATIENT
Start: 2024-08-24 | End: 2024-08-26 | Stop reason: HOSPADM

## 2024-08-24 RX ORDER — ACETAMINOPHEN 325 MG/1
650 TABLET ORAL EVERY 6 HOURS PRN
Status: DISCONTINUED | OUTPATIENT
Start: 2024-08-24 | End: 2024-08-26 | Stop reason: HOSPADM

## 2024-08-24 RX ORDER — SENNOSIDES 8.6 MG
1 TABLET ORAL DAILY
Status: DISCONTINUED | OUTPATIENT
Start: 2024-08-25 | End: 2024-08-26 | Stop reason: HOSPADM

## 2024-08-24 RX ORDER — LABETALOL HYDROCHLORIDE 5 MG/ML
10 INJECTION, SOLUTION INTRAVENOUS ONCE
Status: DISCONTINUED | OUTPATIENT
Start: 2024-08-24 | End: 2024-08-24

## 2024-08-24 RX ORDER — NICOTINE 21 MG/24HR
1 PATCH, TRANSDERMAL 24 HOURS TRANSDERMAL DAILY
Status: DISCONTINUED | OUTPATIENT
Start: 2024-08-25 | End: 2024-08-24

## 2024-08-24 RX ORDER — HEPARIN SODIUM 5000 [USP'U]/ML
7500 INJECTION, SOLUTION INTRAVENOUS; SUBCUTANEOUS EVERY 8 HOURS SCHEDULED
Status: DISCONTINUED | OUTPATIENT
Start: 2024-08-24 | End: 2024-08-26 | Stop reason: HOSPADM

## 2024-08-24 RX ORDER — HYDRALAZINE HYDROCHLORIDE 20 MG/ML
10 INJECTION INTRAMUSCULAR; INTRAVENOUS ONCE
Status: COMPLETED | OUTPATIENT
Start: 2024-08-24 | End: 2024-08-24

## 2024-08-24 RX ORDER — CALCIUM CARBONATE 500 MG/1
1000 TABLET, CHEWABLE ORAL DAILY PRN
Status: DISCONTINUED | OUTPATIENT
Start: 2024-08-24 | End: 2024-08-26 | Stop reason: HOSPADM

## 2024-08-24 RX ORDER — ATORVASTATIN CALCIUM 40 MG/1
40 TABLET, FILM COATED ORAL
Status: DISCONTINUED | OUTPATIENT
Start: 2024-08-24 | End: 2024-08-26 | Stop reason: HOSPADM

## 2024-08-24 RX ORDER — DOCUSATE SODIUM 100 MG/1
100 CAPSULE, LIQUID FILLED ORAL 2 TIMES DAILY
Status: DISCONTINUED | OUTPATIENT
Start: 2024-08-24 | End: 2024-08-26 | Stop reason: HOSPADM

## 2024-08-24 RX ORDER — NICOTINE 21 MG/24HR
1 PATCH, TRANSDERMAL 24 HOURS TRANSDERMAL DAILY
Status: DISCONTINUED | OUTPATIENT
Start: 2024-08-25 | End: 2024-08-26 | Stop reason: HOSPADM

## 2024-08-24 RX ADMIN — HEPARIN SODIUM 7500 UNITS: 5000 INJECTION INTRAVENOUS; SUBCUTANEOUS at 23:34

## 2024-08-24 RX ADMIN — DOCUSATE SODIUM 100 MG: 100 CAPSULE, LIQUID FILLED ORAL at 20:42

## 2024-08-24 RX ADMIN — ATORVASTATIN CALCIUM 40 MG: 40 TABLET, FILM COATED ORAL at 20:42

## 2024-08-24 RX ADMIN — ASPIRIN 81 MG: 81 TABLET, COATED ORAL at 20:42

## 2024-08-24 RX ADMIN — LABETALOL HYDROCHLORIDE 10 MG: 5 INJECTION, SOLUTION INTRAVENOUS at 18:32

## 2024-08-24 RX ADMIN — HYDRALAZINE HYDROCHLORIDE 10 MG: 20 INJECTION INTRAMUSCULAR; INTRAVENOUS at 17:35

## 2024-08-24 NOTE — ED PROVIDER NOTES
History  Chief Complaint   Patient presents with    Facial Droop     Pt c/o left sided facial droop that started yesterday, pt denies any weakness in extremities      Heidi Pate is a 63 y.o.  year old female  Past Medical History:  No date: Shoulder fracture  Social History    Tobacco Use      Smoking status: Every Day        Packs/day: 0.50        Types: Cigarettes      Smokeless tobacco: Never    Vaping Use      Vaping status: Never Used    Alcohol use: No    Drug use: No    Patient presents with:  Facial Droop: Pt c/o left sided facial droop that started yesterday, pt denies any weakness in extremities     Patient woke up with symptoms yesterday morning.  She did go to an urgent care and they told her she needed to follow-up with her primary care doctor for the elevated blood pressure but did not quite tell her why the facial droop was there or what to do with it.  Her blood pressure has been persistently elevated so she came to the emergency department for evaluation and treatment.    Patient out of tPA window due to onset of symptoms.  Plus the symptoms are very much consistent with Bell's palsy.  History obtained directly from the PATIENT              History provided by:  Patient   used: No        Prior to Admission Medications   Prescriptions Last Dose Informant Patient Reported? Taking?   VITAMIN D, CHOLECALCIFEROL, PO Not Taking Self Yes No   Sig: Take 1 tablet by mouth once a week   Patient not taking: Reported on 8/20/2024   acetaminophen (TYLENOL) 325 mg tablet Not Taking Self Yes No   Sig: Take 975 mg by mouth every 6 (six) hours as needed for mild pain (last dose 8pm last night)   Patient not taking: Reported on 8/24/2024   ferrous gluconate (FERGON) 240 (27 FE) MG tablet  Self No No   Sig: Take 1 tablet (240 mg total) by mouth daily   Patient not taking: Reported on 5/17/2021   oxyCODONE-acetaminophen (PERCOCET) 5-325 mg per tablet Not Taking Self No No   Sig: Take 1  tablet by mouth every 4 (four) hours as needed for moderate pain or severe painMax Daily Amount: 6 tablets   Patient not taking: Reported on 4/14/2021      Facility-Administered Medications: None       Past Medical History:   Diagnosis Date    Shoulder fracture        Past Surgical History:   Procedure Laterality Date    CHOLECYSTECTOMY      FOREIGN BODY REMOVAL Left     foot    [place] ARTHROPLASTY GLENOHUMERAL JOINT TOTAL SHOULDER Right 3/3/2021    Procedure: ARTHROPLASTY SHOULDER REVERSE;  Surgeon: Michael Caro DO;  Location: [facility];  Service: Orthopedics    SHOULDER SURGERY Left        Family History   Problem Relation Age of Onset    No Known Problems Mother     No Known Problems Father      I have reviewed and agree with the history as documented.    E-Cigarette/Vaping    E-Cigarette Use Never User      E-Cigarette/Vaping Substances    Nicotine No     THC No     CBD No     Flavoring No     Other No     Unknown No      Social History     Tobacco Use    Smoking status: Every Day     Current packs/day: 0.50     Average packs/day: 0.5 packs/day for 0.6 years (0.3 ttl pk-yrs)     Types: Cigarettes     Start date: 2024    Smokeless tobacco: Never   Vaping Use    Vaping status: Never Used   Substance Use Topics    Alcohol use: No    Drug use: No       Review of Systems   Constitutional:  Negative for chills and fever.   HENT:  Negative for ear pain and sore throat.    Eyes:  Negative for pain and visual disturbance.   Respiratory:  Negative for cough and shortness of breath.    Cardiovascular:  Negative for chest pain and palpitations.   Gastrointestinal:  Negative for abdominal pain and vomiting.   Genitourinary:  Negative for dysuria and hematuria.   Musculoskeletal:  Negative for arthralgias and back pain.   Skin:  Negative for color change and rash.   Neurological:  Positive for facial asymmetry and weakness. Negative for tremors, seizures, syncope, speech difficulty, light-headedness, numbness and headaches.    All other systems reviewed and are negative.      Physical Exam  Physical Exam  Vitals and nursing note reviewed.   Constitutional:       General: She is not in acute distress.     Appearance: Normal appearance. She is well-developed and normal weight.   HENT:      Head: Normocephalic and atraumatic.      Right Ear: External ear normal.      Left Ear: External ear normal.      Nose: Nose normal.      Mouth/Throat:      Mouth: Mucous membranes are moist.   Eyes:      Extraocular Movements: Extraocular movements intact.      Conjunctiva/sclera: Conjunctivae normal.      Pupils: Pupils are equal, round, and reactive to light.   Cardiovascular:      Rate and Rhythm: Normal rate and regular rhythm.      Heart sounds: No murmur heard.  Pulmonary:      Effort: Pulmonary effort is normal. No respiratory distress.      Breath sounds: Normal breath sounds.   Abdominal:      Palpations: Abdomen is soft.      Tenderness: There is no abdominal tenderness.   Musculoskeletal:         General: No swelling.      Cervical back: Neck supple.   Skin:     General: Skin is warm and dry.      Capillary Refill: Capillary refill takes less than 2 seconds.   Neurological:      Mental Status: She is alert and oriented to person, place, and time.      Cranial Nerves: Cranial nerve deficit present.      Sensory: No sensory deficit.      Motor: No weakness (Patient with weakness of the left side of the face both upper and lower facial nerve.  Consistent with Bell's palsy distribution.).   Psychiatric:         Mood and Affect: Mood normal.         Behavior: Behavior normal.         Thought Content: Thought content normal.         Judgment: Judgment normal.         Vital Signs  ED Triage Vitals   Temperature Pulse Respirations Blood Pressure SpO2   08/24/24 1719 08/24/24 1719 08/24/24 1719 08/24/24 1719 08/24/24 1719   98.1 °F (36.7 °C) 92 20 (!) 187/119 98 %      Temp Source Heart Rate Source Patient Position - Orthostatic VS BP Location FiO2  (%)   08/24/24 1719 08/24/24 1719 08/24/24 1719 08/24/24 1719 --   Oral Monitor Sitting Left arm       Pain Score       08/24/24 1857       No Pain           Vitals:    08/24/24 1815 08/24/24 1857 08/24/24 1930 08/24/24 1933   BP: (!) 195/109 (!) 195/127 146/85 146/85   Pulse: 96 81  88   Patient Position - Orthostatic VS:  Lying Sitting Sitting         Visual Acuity  Visual Acuity      Flowsheet Row Most Recent Value   L Pupil Size (mm) 4   R Pupil Size (mm) 4            ED Medications  Medications   acetaminophen (TYLENOL) tablet 650 mg (has no administration in time range)   calcium carbonate (TUMS) chewable tablet 1,000 mg (has no administration in time range)   docusate sodium (COLACE) capsule 100 mg (100 mg Oral Given 8/24/24 2042)   senna (SENOKOT) tablet 8.6 mg (has no administration in time range)   ondansetron (ZOFRAN) injection 4 mg (has no administration in time range)   heparin (porcine) subcutaneous injection 7,500 Units (has no administration in time range)   aspirin (ECOTRIN LOW STRENGTH) EC tablet 81 mg (81 mg Oral Given 8/24/24 2042)   atorvastatin (LIPITOR) tablet 40 mg (40 mg Oral Given 8/24/24 2042)   nicotine (NICODERM CQ) 14 mg/24hr TD 24 hr patch 1 patch (has no administration in time range)   hydrALAZINE (APRESOLINE) injection 10 mg (10 mg Intravenous Given 8/24/24 1735)       Diagnostic Studies  Results Reviewed       Procedure Component Value Units Date/Time    Lipid Panel with Direct LDL reflex [[id number]]  (Abnormal) Collected: 08/24/24 1734    Lab Status: Final result Specimen: Blood from Arm, Left Updated: 08/24/24 1935     Cholesterol 220 mg/dL      Triglycerides 136 mg/dL      HDL, Direct 51 mg/dL      LDL Calculated 142 mg/dL     Hemoglobin A1c w/EAG Estimation [[id number]] Collected: 08/24/24 1734    Lab Status: In process Specimen: Blood from Arm, Left Updated: 08/24/24 1924    RBC Morphology Reflex Test [[id number]] Collected: 08/24/24 1734    Lab Status: Final result Specimen:  Blood from Arm, Left Updated: 08/24/24 1901    Procalcitonin [[id number]]     Lab Status: No result Specimen: Blood     Manual Differential(PHLEBS Do Not Order) [[id number]]  (Abnormal) Collected: 08/24/24 1734    Lab Status: Final result Specimen: Blood from Arm, Left Updated: 08/24/24 1810     Segmented % 61 %      Lymphocytes % 32 %      Monocytes % 5 %      Eosinophils % 0 %      Basophils % 0 %      Atypical Lymphocytes % 2 %      Absolute Neutrophils 6.56 Thousand/uL      Absolute Lymphocytes 3.66 Thousand/uL      Absolute Monocytes 0.54 Thousand/uL      Absolute Eosinophils 0.00 Thousand/uL      Absolute Basophils 0.00 Thousand/uL      Total Counted --     RBC Morphology Present     Platelet Estimate Adequate     Microcytes Present     Polychromasia Present    CBC and differential [[id number]]  (Abnormal) Collected: 08/24/24 1734    Lab Status: Final result Specimen: Blood from Arm, Left Updated: 08/24/24 1810     WBC 10.76 Thousand/uL      RBC 5.65 Million/uL      Hemoglobin 13.0 g/dL      Hematocrit 44.3 %      MCV 78 fL      MCH 23.0 pg      MCHC 29.3 g/dL      RDW 17.9 %      MPV 9.5 fL      Platelets 384 Thousands/uL     Narrative:      This is an appended report.  These results have been appended to a previously verified report.    Basic metabolic panel [[id number]] Collected: 08/24/24 1734    Lab Status: Final result Specimen: Blood from Arm, Left Updated: 08/24/24 1804     Sodium 137 mmol/L      Potassium 4.8 mmol/L      Chloride 103 mmol/L      CO2 25 mmol/L      ANION GAP 9 mmol/L      BUN 16 mg/dL      Creatinine 0.76 mg/dL      Glucose 92 mg/dL      Calcium 9.4 mg/dL      eGFR 84 ml/min/1.73sq m     Narrative:      [employer] guidelines for Chronic Kidney Disease (CKD):     Stage 1 with normal or high GFR (GFR > 90 mL/min/1.73 square meters)    Stage 2 Mild CKD (GFR = 60-89 mL/min/1.73 square meters)    Stage 3A Moderate CKD (GFR = 45-59 mL/min/1.73 square meters)    Stage 3B  Moderate CKD (GFR = 30-44 mL/min/1.73 square meters)    Stage 4 Severe CKD (GFR = 15-29 mL/min/1.73 square meters)    Stage 5 End Stage CKD (GFR <15 mL/min/1.73 square meters)  Note: GFR calculation is accurate only with a steady state creatinine    High Sensitivity Troponin I Random [[id number]]  (Abnormal) Collected: 08/24/24 1734    Lab Status: Final result Specimen: Blood from Arm, Left Updated: 08/24/24 1802     HS TnI random 6 ng/L     Lyme Total AB W Reflex to IGM/IGG [[id number]] Collected: 08/24/24 1734    Lab Status: In process Specimen: Blood from Arm, Left Updated: 08/24/24 1736    Narrative:      The following orders were created for panel order Lyme Total AB W Reflex to IGM/IGG.  Procedure                               Abnormality         Status                     ---------                               -----------         ------                     Lyme Total AB W Reflex t...[[id number]]                      In process                   Please view results for these tests on the individual orders.    Lyme Total AB W Reflex to IGM/IGG [[id number]] Collected: 08/24/24 1734    Lab Status: In process Specimen: Blood from Arm, Left Updated: 08/24/24 1736    Fingerstick Glucose (POCT) [[id number]]  (Normal) Collected: 08/24/24 1727    Lab Status: Final result Specimen: Blood Updated: 08/24/24 1729     POC Glucose 95 mg/dl                    CT head without contrast   Final Result by Edenilson Lerma MD (08/24 1822)      Multiple foci of white matter hypoattenuation, may be the sequela of chronic small vessel ischemic disease but in absence of comparison imaging MRI is recommended if there is concern for acute infarction.         The study was marked in EPIC for immediate notification.                  Workstation performed: [id number]         MRI inpatient order    (Results Pending)              Procedures  Procedures         ED Course  ED Course as of 08/24/24 2112   Sat Aug 24, 2024   1743 WBC(!): 10.76   1743  Hemoglobin: 13.0   1807 EKG is normal sinus rhythm rate of 92 bpm.  Nonspecific ST segment abnormalities.  No ST elevations or depressions.                                               Medical Decision Making  Patient has presented to the Emergency Department with exacerbation of chronic condition / pt with new illness-injury that may poses a threat to life or body function.  At least 3 different tests have been ordered on this patient AND reviewed the results.   Old laboratory data (of at least 2 tests) were reviewed from the medical records and compared to today's results  Discussion with patient and/or family members of results (normal and abnormal) and the implications for immediate and long term treatment/management.  Due to the high risk of morbidity further diagnostic testing and treatment is necessary.    6:28 PM  I discussed the case with the hospitalist. We reviewed the HPI, pertinent PMH, ED course and management.   Hospitalist agreed with plan and will admit the patient to the hospital.    Medications  labetalol (NORMODYNE) injection 10 mg (has no administration in time range)  hydrALAZINE (APRESOLINE) injection 10 mg (10 mg Intravenous Given 8/24/24 1735)            Problems Addressed:  Bell's palsy: acute illness or injury  Uncontrolled hypertension: chronic illness or injury with exacerbation, progression, or side effects of treatment    Amount and/or Complexity of Data Reviewed  Labs: ordered. Decision-making details documented in ED Course.  Radiology: ordered.    Risk  Prescription drug management.  Decision regarding hospitalization.                 Disposition  Final diagnoses:   Bell's palsy   Uncontrolled hypertension     Time reflects when diagnosis was documented in both MDM as applicable and the Disposition within this note       Time User Action Codes Description Comment    8/24/2024  6:28 PM Simeon Payan [G51.0] Bell's palsy     8/24/2024  6:28 PM Simeon Payan [I10] Uncontrolled  hypertension     8/24/2024  6:43 PM Alex Sprague Add [R29.810] Facial droop           ED Disposition       ED Disposition   Admit    Condition   Stable    Date/Time   Sat Aug 24, 2024  6:44 PM    Comment   Case was discussed with Hospitalist and the patient's admission status was agreed to be Admission Status: observation status to the service of Dr. Sprague   .               Follow-up Information    None         Current Discharge Medication List        CONTINUE these medications which have NOT CHANGED    Details   acetaminophen (TYLENOL) 325 mg tablet Take 975 mg by mouth every 6 (six) hours as needed for mild pain (last dose 8pm last night)      ferrous gluconate (FERGON) 240 (27 FE) MG tablet Take 1 tablet (240 mg total) by mouth daily  Qty: 30 tablet, Refills: 1    Associated Diagnoses: Iron deficiency anemia secondary to inadequate dietary iron intake      oxyCODONE-acetaminophen (PERCOCET) 5-325 mg per tablet Take 1 tablet by mouth every 4 (four) hours as needed for moderate pain or severe painMax Daily Amount: 6 tablets  Qty: 20 tablet, Refills: 0    Associated Diagnoses: Status post reverse total replacement of right shoulder      VITAMIN D, CHOLECALCIFEROL, PO Take 1 tablet by mouth once a week             No discharge procedures on file.    PDMP Review       None            ED Provider  Electronically Signed by             Simeon Payan MD  08/24/24 [id number]

## 2024-08-24 NOTE — ASSESSMENT & PLAN NOTE
Present on ED arrival   Not on hypertensive meds at home  Possible new diagnosis   Permissive hypertension for now

## 2024-08-24 NOTE — H&P
[facility]  H&P  Name: Heidi Pate 63 y.o. female I MRN: [id number]  Unit/Bed#: ED 26 I Date of Admission: 8/24/2024   Date of Service: 8/24/2024 I Hospital Day: 0      Assessment & Plan   SIRS (systemic inflammatory response syndrome) (HCC)  Assessment & Plan  Tachycardia with leukocytosis  Likely from bells palsy and hypertensive urgency  Monitor off antibiotics  Check procal     Facial droop  Assessment & Plan  Left facial droop  Concern for bells palsy   However CT head showing possible subacute stroke  Symptoms were noted within last 24 hours  Check MRI brain  Start permissive hypertension  Start aspirin and statin    Hypertensive urgency  Assessment & Plan  Present on ED arrival   Not on hypertensive meds at home  Possible new diagnosis   Permissive hypertension for now           VTE Pharmacologic Prophylaxis:   Moderate Risk (Score 3-4) - Pharmacological DVT Prophylaxis Ordered: heparin.  Code Status: Level 1 - Full Code   Discussion with family: Updated  ( and daughter) at bedside.    Anticipated Length of Stay: Patient will be admitted on an observation basis with an anticipated length of stay of less than 2 midnights secondary to stroke pathway .    Total Time Spent on Date of Encounter in care of patient: 30+ mins. This time was spent on one or more of the following: performing physical exam; counseling and coordination of care; obtaining or reviewing history; documenting in the medical record; reviewing/ordering tests, medications or procedures; communicating with other healthcare professionals and discussing with patient's family/caregivers.    Chief Complaint: facial droop    History of Present Illness:  Heidi Pate is a 63 y.o. female with a PMH of smoker who presents with left sided facial droop first noted yesterday. Does not report any associated weakness or other neurologic deficits. She came to the ED for further evaluation. In the  ED she was noted to be in hypertensive urgency and admitted to [facility] for further management.    Review of Systems:  Review of Systems   Constitutional:  Negative for chills and fever.   HENT:  Negative for ear pain and sore throat.    Eyes:  Negative for pain and visual disturbance.   Respiratory:  Negative for cough and shortness of breath.    Cardiovascular:  Negative for chest pain and palpitations.   Gastrointestinal:  Negative for abdominal pain and vomiting.   Genitourinary:  Negative for dysuria and hematuria.   Musculoskeletal:  Negative for arthralgias and back pain.   Skin:  Negative for color change and rash.   Neurological:  Positive for facial asymmetry. Negative for seizures and syncope.   All other systems reviewed and are negative.      Past Medical and Surgical History:   Past Medical History:   Diagnosis Date    Shoulder fracture        Past Surgical History:   Procedure Laterality Date    CHOLECYSTECTOMY      FOREIGN BODY REMOVAL Left     foot    ME ARTHROPLASTY GLENOHUMERAL JOINT TOTAL SHOULDER Right 3/3/2021    Procedure: ARTHROPLASTY SHOULDER REVERSE;  Surgeon: Michael Caro DO;  Location: [facility] [place] OR;  Service: Orthopedics    SHOULDER SURGERY Left        Meds/Allergies:  Prior to Admission medications    Medication Sig Start Date End Date Taking? Authorizing Provider   acetaminophen (TYLENOL) 325 mg tablet Take 975 mg by mouth every 6 (six) hours as needed for mild pain (last dose 8pm last night)    Historical Provider, MD   ferrous gluconate (FERGON) 240 (27 FE) MG tablet Take 1 tablet (240 mg total) by mouth daily  Patient not taking: Reported on 5/17/2021 2/26/21 5/17/21  Mariella Thomason DO   oxyCODONE-acetaminophen (PERCOCET) 5-325 mg per tablet Take 1 tablet by mouth every 4 (four) hours as needed for moderate pain or severe painMax Daily Amount: 6 tablets  Patient not taking: Reported on 4/14/2021 3/4/21   Nan Garcia PA-C   VITAMIN D, CHOLECALCIFEROL, PO Take 1 tablet by  "mouth once a week  Patient not taking: Reported on 8/20/2024    Historical Provider, MD     I have reviewed home medications using recent Epic encounter.    Allergies: No Known Allergies    Social History:  Marital Status: /Civil Union   Occupation: na  Patient Pre-hospital Living Situation: Home  Patient Pre-hospital Level of Mobility: walks  Patient Pre-hospital Diet Restrictions: na  Substance Use History:   Social History     Substance and Sexual Activity   Alcohol Use No     Social History     Tobacco Use   Smoking Status Every Day    Current packs/day: 0.50    Types: Cigarettes   Smokeless Tobacco Never     Social History     Substance and Sexual Activity   Drug Use No       Family History:  Family History   Problem Relation Age of Onset    No Known Problems Mother     No Known Problems Father        Physical Exam:     Vitals:   Blood Pressure: (!) 195/109 (08/24/24 1815)  Pulse: 96 (08/24/24 1815)  Temperature: 98.1 °F (36.7 °C) (08/24/24 1719)  Temp Source: Oral (08/24/24 1719)  Respirations: 20 (08/24/24 1815)  Height: 5' 1\" (154.9 cm) (08/24/24 1719)  Weight - Scale: 116 kg (255 lb) (08/24/24 1719)  SpO2: 98 % (08/24/24 1815)    Physical Exam  Constitutional:       General: She is not in acute distress.     Appearance: Normal appearance. She is not toxic-appearing.   Cardiovascular:      Rate and Rhythm: Normal rate and regular rhythm.      Heart sounds: Normal heart sounds. No murmur heard.  Pulmonary:      Effort: Pulmonary effort is normal. No respiratory distress.      Breath sounds: Normal breath sounds. No wheezing.   Abdominal:      General: Abdomen is flat. There is no distension.      Palpations: Abdomen is soft.      Tenderness: There is no abdominal tenderness.   Neurological:      General: No focal deficit present.      Mental Status: She is alert and oriented to person, place, and time. Mental status is at baseline.      Motor: No weakness.          Additional Data:     Lab " Results:  Results from last 7 days   Lab Units 08/24/24  1734   WBC Thousand/uL 10.76*   HEMOGLOBIN g/dL 13.0   HEMATOCRIT % 44.3   PLATELETS Thousands/uL 384   LYMPHO PCT % 32   MONO PCT % 5   EOS PCT % 0     Results from last 7 days   Lab Units 08/24/24  1734   SODIUM mmol/L 137   POTASSIUM mmol/L 4.8   CHLORIDE mmol/L 103   CO2 mmol/L 25   BUN mg/dL 16   CREATININE mg/dL 0.76   ANION GAP mmol/L 9   CALCIUM mg/dL 9.4   GLUCOSE RANDOM mg/dL 92         Results from last 7 days   Lab Units 08/24/24  1727   POC GLUCOSE mg/dl 95     Lab Results   Component Value Date    HGBA1C 6.0 (H) 02/15/2021           Lines/Drains:  Invasive Devices       Peripheral Intravenous Line  Duration             Peripheral IV 08/24/24 Distal;Right;Upper;Ventral (anterior) Arm <1 day                        Imaging: Reviewed radiology reports from this admission including: CT head  CT head without contrast   Final Result by Edenilson Lerma MD (08/24 1822)      Multiple foci of white matter hypoattenuation, may be the sequela of chronic small vessel ischemic disease but in absence of comparison imaging MRI is recommended if there is concern for acute infarction.         The study was marked in EPIC for immediate notification.                  Workstation performed: [id number]         MRI inpatient order    (Results Pending)       EKG and Other Studies Reviewed on Admission:   EKG: No EKG obtained.    ** Please Note: This note has been constructed using a voice recognition system. **

## 2024-08-24 NOTE — ASSESSMENT & PLAN NOTE
Left facial droop  Concern for bells palsy   However CT head showing possible subacute stroke  Symptoms were noted within last 24 hours  Check MRI brain  Start permissive hypertension  Start aspirin and statin

## 2024-08-24 NOTE — PLAN OF CARE
Problem: PAIN - ADULT  Goal: Verbalizes/displays adequate comfort level or baseline comfort level  Description: Interventions:  - Encourage patient to monitor pain and request assistance  - Assess pain using appropriate pain scale  - Administer analgesics based on type and severity of pain and evaluate response  - Implement non-pharmacological measures as appropriate and evaluate response  - Consider cultural and social influences on pain and pain management  - Notify physician/advanced practitioner if interventions unsuccessful or patient reports new pain  Outcome: Progressing     Problem: INFECTION - ADULT  Goal: Absence or prevention of progression during hospitalization  Description: INTERVENTIONS:  - Assess and monitor for signs and symptoms of infection  - Monitor lab/diagnostic results  - Monitor all insertion sites, i.e. indwelling lines, tubes, and drains  - Monitor endotracheal if appropriate and nasal secretions for changes in amount and color  - [place] appropriate cooling/warming therapies per order  - Administer medications as ordered  - Instruct and encourage patient and family to use good hand hygiene technique  - Identify and instruct in appropriate isolation precautions for identified infection/condition  Outcome: Progressing  Goal: Absence of fever/infection during neutropenic period  Description: INTERVENTIONS:  - Monitor WBC    Outcome: Progressing     Problem: SAFETY ADULT  Goal: Patient will remain free of falls  Description: INTERVENTIONS:  - Educate patient/family on patient safety including physical limitations  - Instruct patient to call for assistance with activity   - Consult OT/PT to assist with strengthening/mobility   - Keep Call bell within reach  - Keep bed low and locked with side rails adjusted as appropriate  - Keep care items and personal belongings within reach  - Initiate and maintain comfort rounds  - Make Fall Risk Sign visible to staff  - Offer Toileting every 2 Hours,  in advance of need  - Initiate/Maintain bed and chair alarm  - Obtain necessary fall risk management equipment: non skid socks  - Apply yellow socks and bracelet for high fall risk patients  - Consider moving patient to room near nurses station  Outcome: Progressing  Goal: Maintain or return to baseline ADL function  Description: INTERVENTIONS:  -  Assess patient's ability to carry out ADLs; assess patient's baseline for ADL function and identify physical deficits which impact ability to perform ADLs (bathing, care of mouth/teeth, toileting, grooming, dressing, etc.)  - Assess/evaluate cause of self-care deficits   - Assess range of motion  - Assess patient's mobility; develop plan if impaired  - Assess patient's need for assistive devices and provide as appropriate  - Encourage maximum independence but intervene and supervise when necessary  - Involve family in performance of ADLs  - Assess for home care needs following discharge   - Consider OT consult to assist with ADL evaluation and planning for discharge  - Provide patient education as appropriate  Outcome: Progressing  Goal: Maintains/Returns to pre admission functional level  Description: INTERVENTIONS:  - Perform AM-PAC 6 Click Basic Mobility/ Daily Activity assessment daily.  - Set and communicate daily mobility goal to care team and patient/family/caregiver.   - Collaborate with rehabilitation services on mobility goals if consulted  - Perform Range of Motion 3 times a day.  - Reposition patient every 2 hours.  - Dangle patient 3 times a day  - Stand patient 3 times a day  - Ambulate patient 3 times a day  - Out of bed to chair 3 times a day   - Out of bed for meals 3 times a day  - Out of bed for toileting  - Record patient progress and toleration of activity level   Outcome: Progressing     Problem: DISCHARGE PLANNING  Goal: Discharge to home or other facility with appropriate resources  Description: INTERVENTIONS:  - Identify barriers to discharge  w/patient and caregiver  - Arrange for needed discharge resources and transportation as appropriate  - Identify discharge learning needs (meds, wound care, etc.)  - Arrange for interpretive services to assist at discharge as needed  - Refer to Case Management Department for coordinating discharge planning if the patient needs post-hospital services based on physician/advanced practitioner order or complex needs related to functional status, cognitive ability, or social support system  Outcome: Progressing     Problem: Knowledge Deficit  Goal: Patient/family/caregiver demonstrates understanding of disease process, treatment plan, medications, and discharge instructions  Description: Complete learning assessment and assess knowledge base.  Interventions:  - Provide teaching at level of understanding  - Provide teaching via preferred learning methods  Outcome: Progressing     Problem: NEUROSENSORY - ADULT  Goal: Achieves stable or improved neurological status  Description: INTERVENTIONS  - Monitor and report changes in neurological status  - Monitor vital signs such as temperature, blood pressure, glucose, and any other labs ordered   - Initiate measures to prevent increased intracranial pressure  - Monitor for seizure activity and implement precautions if appropriate      Outcome: Progressing  Goal: Remains free of injury related to seizures activity  Description: INTERVENTIONS  - Maintain airway, patient safety  and administer oxygen as ordered  - Monitor patient for seizure activity, document and report duration and description of seizure to physician/advanced practitioner  - If seizure occurs,  ensure patient safety during seizure  - Reorient patient post seizure  - Seizure pads on all 4 side rails  - Instruct patient/family to notify RN of any seizure activity including if an aura is experienced  - Instruct patient/family to call for assistance with activity based on nursing assessment  - Administer anti-seizure  medications if ordered    Outcome: Progressing  Goal: Achieves maximal functionality and self care  Description: INTERVENTIONS  - Monitor swallowing and airway patency with patient fatigue and changes in neurological status  - Encourage and assist patient to increase activity and self care.   - Encourage visually impaired, hearing impaired and aphasic patients to use assistive/communication devices  Outcome: Progressing     Problem: CARDIOVASCULAR - ADULT  Goal: Maintains optimal cardiac output and hemodynamic stability  Description: INTERVENTIONS:  - Monitor I/O, vital signs and rhythm  - Monitor for S/S and trends of decreased cardiac output  - Administer and titrate ordered vasoactive medications to optimize hemodynamic stability  - Assess quality of pulses, skin color and temperature  - Assess for signs of decreased coronary artery perfusion  - Instruct patient to report change in severity of symptoms  Outcome: Progressing  Goal: Absence of cardiac dysrhythmias or at baseline rhythm  Description: INTERVENTIONS:  - Continuous cardiac monitoring, vital signs, obtain 12 lead EKG if ordered  - Administer antiarrhythmic and heart rate control medications as ordered  - Monitor electrolytes and administer replacement therapy as ordered  Outcome: Progressing     Problem: RESPIRATORY - ADULT  Goal: Achieves optimal ventilation and oxygenation  Description: INTERVENTIONS:  - Assess for changes in respiratory status  - Assess for changes in mentation and behavior  - Position to facilitate oxygenation and minimize respiratory effort  - Oxygen administered by appropriate delivery if ordered  - Initiate smoking cessation education as indicated  - Encourage broncho-pulmonary hygiene including cough, deep breathe, Incentive Spirometry  - Assess the need for suctioning and aspirate as needed  - Assess and instruct to report SOB or any respiratory difficulty  - Respiratory Therapy support as indicated  Outcome: Progressing      Problem: GASTROINTESTINAL - ADULT  Goal: Minimal or absence of nausea and/or vomiting  Description: INTERVENTIONS:  - Administer IV fluids if ordered to ensure adequate hydration  - Maintain NPO status until nausea and vomiting are resolved  - Nasogastric tube if ordered  - Administer ordered antiemetic medications as needed  - Provide nonpharmacologic comfort measures as appropriate  - Advance diet as tolerated, if ordered  - Consider nutrition services referral to assist patient with adequate nutrition and appropriate food choices  Outcome: Progressing  Goal: Maintains or returns to baseline bowel function  Description: INTERVENTIONS:  - Assess bowel function  - Encourage oral fluids to ensure adequate hydration  - Administer IV fluids if ordered to ensure adequate hydration  - Administer ordered medications as needed  - Encourage mobilization and activity  - Consider nutritional services referral to assist patient with adequate nutrition and appropriate food choices  Outcome: Progressing  Goal: Maintains adequate nutritional intake  Description: INTERVENTIONS:  - Monitor percentage of each meal consumed  - Identify factors contributing to decreased intake, treat as appropriate  - Assist with meals as needed  - Monitor I&O, weight, and lab values if indicated  - Obtain nutrition services referral as needed  Outcome: Progressing  Goal: Establish and maintain optimal ostomy function  Description: INTERVENTIONS:  - Assess bowel function  - Encourage oral fluids to ensure adequate hydration  - Administer IV fluids if ordered to ensure adequate hydration   - Administer ordered medications as needed  - Encourage mobilization and activity  - Nutrition services referral to assist patient with appropriate food choices  - Assess stoma site  - Consider wound care consult   Outcome: Progressing  Goal: Oral mucous membranes remain intact  Description: INTERVENTIONS  - Assess oral mucosa and hygiene practices  - Implement  preventative oral hygiene regimen  - Implement oral medicated treatments as ordered  - Initiate Nutrition services referral as needed  Outcome: Progressing     Problem: GENITOURINARY - ADULT  Goal: Maintains or returns to baseline urinary function  Description: INTERVENTIONS:  - Assess urinary function  - Encourage oral fluids to ensure adequate hydration if ordered  - Administer IV fluids as ordered to ensure adequate hydration  - Administer ordered medications as needed  - Offer frequent toileting  - Follow urinary retention protocol if ordered  Outcome: Progressing  Goal: Absence of urinary retention  Description: INTERVENTIONS:  - Assess patient’s ability to void and empty bladder  - Monitor I/O  - Bladder scan as needed  - Discuss with physician/AP medications to alleviate retention as needed  - Discuss catheterization for long term situations as appropriate  Outcome: Progressing  Goal: Urinary catheter remains patent  Description: INTERVENTIONS:  - Assess patency of urinary catheter  - If patient has a chronic herzog, consider changing catheter if non-functioning  - Follow guidelines for intermittent irrigation of non-functioning urinary catheter  Outcome: Progressing     Problem: METABOLIC, FLUID AND ELECTROLYTES - ADULT  Goal: Electrolytes maintained within normal limits  Description: INTERVENTIONS:  - Monitor labs and assess patient for signs and symptoms of electrolyte imbalances  - Administer electrolyte replacement as ordered  - Monitor response to electrolyte replacements, including repeat lab results as appropriate  - Instruct patient on fluid and nutrition as appropriate  Outcome: Progressing  Goal: Fluid balance maintained  Description: INTERVENTIONS:  - Monitor labs   - Monitor I/O and WT  - Instruct patient on fluid and nutrition as appropriate  - Assess for signs & symptoms of volume excess or deficit  Outcome: Progressing  Goal: Glucose maintained within target range  Description:  INTERVENTIONS:  - Monitor Blood Glucose as ordered  - Assess for signs and symptoms of hyperglycemia and hypoglycemia  - Administer ordered medications to maintain glucose within target range  - Assess nutritional intake and initiate nutrition service referral as needed  Outcome: Progressing     Problem: SKIN/TISSUE INTEGRITY - ADULT  Goal: Skin Integrity remains intact(Skin Breakdown Prevention)  Description: Assess:  -Perform Fausto assessment every 2 hours  -Clean and moisturize skin every day  -Inspect skin when repositioning, toileting, and assisting with ADLS  -Assess extremities for adequate circulation and sensation     Bed Management:  -Have minimal linens on bed & keep smooth, unwrinkled  -Change linens as needed when moist or perspiring  -Avoid sitting or lying in one position for more than 2 hours while in bed  -Keep HOB at 30 degrees     Activity:  -Mobilize patient 3 times a day  -Encourage activity and walks on unit  -Encourage or provide ROM exercises   -Turn and reposition patient every 2 Hours  -Use appropriate equipment to lift or move patient in bed  -Instruct/ Assist with weight shifting every 2 hours  when out of bed in chair    Skin Care:  -Avoid use of baby powder, tape, friction and shearing, hot water or constrictive clothing  -Relieve pressure over bony prominences using leave ins   -Do not massage red bony areas    Next Steps:  -Teach patient strategies to minimize risks such as falls   -Consider consults to  interdisciplinary teams such as case management  Outcome: Progressing  Goal: Incision(s), wounds(s) or drain site(s) healing without S/S of infection  Description: INTERVENTIONS  - Assess and document dressing, incision, wound bed, drain sites and surrounding tissue  - Provide patient and family education  - Perform skin care/dressing changes every 8 hrs  Outcome: Progressing  Goal: Pressure injury heals and does not worsen  Description: Interventions:  - Implement low air loss  mattress or specialty surface (Criteria met)  - Apply silicone foam dressing  - Instruct/assist with weight shifting every 120 minutes when in chair   - Limit chair time to 4 hour intervals  - Use special pressure reducing interventions such as leave ins when in chair   - Apply fecal or urinary incontinence containment device   - Perform passive or active ROM every 3 hours  - Turn and reposition patient & offload bony prominences every 2 hours hours   - Consider nutrition services referral as needed  Outcome: Progressing     Problem: HEMATOLOGIC - ADULT  Goal: Maintains hematologic stability  Description: INTERVENTIONS  - Assess for signs and symptoms of bleeding or hemorrhage  - Monitor labs  - Administer supportive blood products/factors as ordered and appropriate  Outcome: Progressing     Problem: MUSCULOSKELETAL - ADULT  Goal: Maintain or return mobility to safest level of function  Description: INTERVENTIONS:  - Assess patient's ability to carry out ADLs; assess patient's baseline for ADL function and identify physical deficits which impact ability to perform ADLs (bathing, care of mouth/teeth, toileting, grooming, dressing, etc.)  - Assess/evaluate cause of self-care deficits   - Assess range of motion  - Assess patient's mobility  - Assess patient's need for assistive devices and provide as appropriate  - Encourage maximum independence but intervene and supervise when necessary  - Involve family in performance of ADLs  - Assess for home care needs following discharge   - Consider OT consult to assist with ADL evaluation and planning for discharge  - Provide patient education as appropriate  Outcome: Progressing  Goal: Maintain proper alignment of affected body part  Description: INTERVENTIONS:  - Support, maintain and protect limb and body alignment  - Provide patient/ family with appropriate education  Outcome: Progressing     Problem: Nutrition/Hydration-ADULT  Goal: Nutrient/Hydration intake appropriate  for improving, restoring or maintaining nutritional needs  Description: Monitor and assess patient's nutrition/hydration status for malnutrition. Collaborate with interdisciplinary team and initiate plan and interventions as ordered.  Monitor patient's weight and dietary intake as ordered or per policy. Utilize nutrition screening tool and intervene as necessary. Determine patient's food preferences and provide high-protein, high-caloric foods as appropriate.     INTERVENTIONS:  - Monitor oral intake, urinary output, labs, and treatment plans  - Assess nutrition and hydration status and recommend course of action  - Evaluate amount of meals eaten  - Assist patient with eating if necessary   - Allow adequate time for meals  - Recommend/ encourage appropriate diets, oral nutritional supplements, and vitamin/mineral supplements  - Order, calculate, and assess calorie counts as needed  - Recommend, monitor, and adjust tube feedings and TPN/PPN based on assessed needs  - Assess need for intravenous fluids  - Provide specific nutrition/hydration education as appropriate  - Include patient/family/caregiver in decisions related to nutrition  Outcome: Progressing     Problem: Neurological Deficit  Goal: Neurological status is stable or improving  Description: Interventions:  - Monitor and assess patient's level of consciousness, motor function, sensory function, and level of assistance needed for ADLs.   - Monitor and report changes from baseline. Collaborate with interdisciplinary team to initiate plan and implement interventions as ordered.   - Provide and maintain a safe environment.  - Consider seizure precautions.  - Consider fall precautions.  - Consider aspiration precautions.  - Consider bleeding precautions.  Outcome: Progressing     Problem: Activity Intolerance/Impaired Mobility  Goal: Mobility/activity is maintained at optimum level for patient  Description: Interventions:  - Assess and monitor patient  barriers  to mobility and need for assistive/adaptive devices.  - Assess patient's emotional response to limitations.  - Collaborate with interdisciplinary team and initiate plans and interventions as ordered.  - Encourage independent activity per ability.  - Maintain proper body alignment.  - Perform active/passive rom as tolerated/ordered.  - Plan activities to conserve energy.  - Turn patient as appropriate  Outcome: Progressing     Problem: Communication Impairment  Goal: Ability to express needs and understand communication  Description: Assess patient's communication skills and ability to understand information.  Patient will demonstrate use of effective communication techniques, alternative methods of communication and understanding even if not able to speak.     - Encourage communication and provide alternate methods of communication as needed.  - Collaborate with case management/ for discharge needs.  - Include patient/family/caregiver in decisions related to communication.  Outcome: Progressing     Problem: Potential for Aspiration  Goal: Non-ventilated patient's risk of aspiration is minimized  Description: Assess and monitor vital signs, respiratory status, and labs (WBC).  Monitor for signs of aspiration (tachypnea, cough, rales, wheezing, cyanosis, fever).    - Assess and monitor patient's ability to swallow.  - Place patient up in chair to eat if possible.  - HOB up at 90 degrees to eat if unable to get patient up into chair.  - Supervise patient during oral intake.   - Instruct patient/ family to take small bites.  - Instruct patient/ family to take small single sips when taking liquids.  - Follow patient-specific strategies generated by speech pathologist.  Outcome: Progressing  Goal: Ventilated patient's risk of aspiration is minimized  Description: Assess and monitor vital signs, respiratory status, airway cuff pressure, and labs (WBC).  Monitor for signs of aspiration (tachypnea, cough,  rales, wheezing, cyanosis, fever).    - Elevate head of bed 30 degrees if patient has tube feeding.  - Monitor tube feeding.  Outcome: Progressing     Problem: Nutrition  Goal: Nutrition/Hydration status is improving  Description: Monitor and assess patient's nutrition/hydration status for malnutrition (ex- brittle hair, bruises, dry skin, pale skin and conjunctiva, muscle wasting, smooth red tongue, and disorientation). Collaborate with interdisciplinary team and initiate plan and interventions as ordered.  Monitor patient's weight and dietary intake as ordered or per policy. Utilize nutrition screening tool and intervene per policy. Determine patient's food preferences and provide high-protein, high-caloric foods as appropriate.     - Assist patient with eating.  - Allow adequate time for meals.  - Encourage patient to take dietary supplement as ordered.  - Collaborate with clinical nutritionist.  - Include patient/family/caregiver in decisions related to nutrition.  Outcome: Progressing

## 2024-08-24 NOTE — ASSESSMENT & PLAN NOTE
Tachycardia with leukocytosis  Likely from bells palsy and hypertensive urgency  Monitor off antibiotics  Check procal

## 2024-08-25 ENCOUNTER — APPOINTMENT (OUTPATIENT)
Dept: NON INVASIVE DIAGNOSTICS | Facility: HOSPITAL | Age: 64
DRG: 074 | End: 2024-08-25
Payer: COMMERCIAL

## 2024-08-25 ENCOUNTER — APPOINTMENT (OUTPATIENT)
Dept: MRI IMAGING | Facility: HOSPITAL | Age: 64
DRG: 074 | End: 2024-08-25
Payer: COMMERCIAL

## 2024-08-25 PROBLEM — R73.09 ELEVATED HEMOGLOBIN A1C: Status: ACTIVE | Noted: 2024-08-25

## 2024-08-25 LAB
ANION GAP SERPL CALCULATED.3IONS-SCNC: 6 MMOL/L (ref 4–13)
B BURGDOR IGG+IGM SER QL IA: NEGATIVE
BUN SERPL-MCNC: 15 MG/DL (ref 5–25)
CALCIUM SERPL-MCNC: 9 MG/DL (ref 8.4–10.2)
CHLORIDE SERPL-SCNC: 104 MMOL/L (ref 96–108)
CHOLEST SERPL-MCNC: 179 MG/DL
CO2 SERPL-SCNC: 29 MMOL/L (ref 21–32)
CREAT SERPL-MCNC: 0.71 MG/DL (ref 0.6–1.3)
ERYTHROCYTE [DISTWIDTH] IN BLOOD BY AUTOMATED COUNT: 17.5 % (ref 11.6–15.1)
EST. AVERAGE GLUCOSE BLD GHB EST-MCNC: 146 MG/DL
GFR SERPL CREATININE-BSD FRML MDRD: 92 ML/MIN/1.73SQ M
GLUCOSE P FAST SERPL-MCNC: 108 MG/DL (ref 65–99)
GLUCOSE SERPL-MCNC: 108 MG/DL (ref 65–140)
HBA1C MFR BLD: 6.7 %
HCT VFR BLD AUTO: 38.7 % (ref 34.8–46.1)
HDLC SERPL-MCNC: 44 MG/DL
HGB BLD-MCNC: 11.4 G/DL (ref 11.5–15.4)
LDLC SERPL CALC-MCNC: 110 MG/DL (ref 0–100)
MAGNESIUM SERPL-MCNC: 2.1 MG/DL (ref 1.9–2.7)
MCH RBC QN AUTO: 22.7 PG (ref 26.8–34.3)
MCHC RBC AUTO-ENTMCNC: 29.5 G/DL (ref 31.4–37.4)
MCV RBC AUTO: 77 FL (ref 82–98)
PLATELET # BLD AUTO: 304 THOUSANDS/UL (ref 149–390)
PMV BLD AUTO: 9.5 FL (ref 8.9–12.7)
POTASSIUM SERPL-SCNC: 3.9 MMOL/L (ref 3.5–5.3)
PROCALCITONIN SERPL-MCNC: 0.06 NG/ML
RBC # BLD AUTO: 5.02 MILLION/UL (ref 3.81–5.12)
SODIUM SERPL-SCNC: 139 MMOL/L (ref 135–147)
TRIGL SERPL-MCNC: 126 MG/DL
WBC # BLD AUTO: 8.94 THOUSAND/UL (ref 4.31–10.16)

## 2024-08-25 PROCEDURE — 85027 COMPLETE CBC AUTOMATED: CPT | Performed by: STUDENT IN AN ORGANIZED HEALTH CARE EDUCATION/TRAINING PROGRAM

## 2024-08-25 PROCEDURE — 83735 ASSAY OF MAGNESIUM: CPT | Performed by: STUDENT IN AN ORGANIZED HEALTH CARE EDUCATION/TRAINING PROGRAM

## 2024-08-25 PROCEDURE — 99233 SBSQ HOSP IP/OBS HIGH 50: CPT | Performed by: FAMILY MEDICINE

## 2024-08-25 PROCEDURE — 84145 PROCALCITONIN (PCT): CPT | Performed by: STUDENT IN AN ORGANIZED HEALTH CARE EDUCATION/TRAINING PROGRAM

## 2024-08-25 PROCEDURE — 80061 LIPID PANEL: CPT | Performed by: FAMILY MEDICINE

## 2024-08-25 PROCEDURE — 70551 MRI BRAIN STEM W/O DYE: CPT

## 2024-08-25 PROCEDURE — 80048 BASIC METABOLIC PNL TOTAL CA: CPT | Performed by: STUDENT IN AN ORGANIZED HEALTH CARE EDUCATION/TRAINING PROGRAM

## 2024-08-25 RX ORDER — PREDNISONE 20 MG/1
40 TABLET ORAL 2 TIMES DAILY WITH MEALS
Status: DISCONTINUED | OUTPATIENT
Start: 2024-08-25 | End: 2024-08-26 | Stop reason: HOSPADM

## 2024-08-25 RX ORDER — AMLODIPINE BESYLATE 5 MG/1
5 TABLET ORAL DAILY
Status: DISCONTINUED | OUTPATIENT
Start: 2024-08-25 | End: 2024-08-26 | Stop reason: HOSPADM

## 2024-08-25 RX ADMIN — HEPARIN SODIUM 7500 UNITS: 5000 INJECTION INTRAVENOUS; SUBCUTANEOUS at 23:04

## 2024-08-25 RX ADMIN — PREDNISONE 40 MG: 20 TABLET ORAL at 17:16

## 2024-08-25 RX ADMIN — ASPIRIN 81 MG: 81 TABLET, COATED ORAL at 09:37

## 2024-08-25 RX ADMIN — SENNOSIDES 8.6 MG: 8.6 TABLET, FILM COATED ORAL at 09:37

## 2024-08-25 RX ADMIN — AMLODIPINE BESYLATE 5 MG: 5 TABLET ORAL at 12:59

## 2024-08-25 RX ADMIN — HEPARIN SODIUM 7500 UNITS: 5000 INJECTION INTRAVENOUS; SUBCUTANEOUS at 13:46

## 2024-08-25 RX ADMIN — NICOTINE 1 PATCH: 14 PATCH, EXTENDED RELEASE TRANSDERMAL at 09:37

## 2024-08-25 RX ADMIN — DOCUSATE SODIUM 100 MG: 100 CAPSULE, LIQUID FILLED ORAL at 09:37

## 2024-08-25 RX ADMIN — HEPARIN SODIUM 7500 UNITS: 5000 INJECTION INTRAVENOUS; SUBCUTANEOUS at 06:41

## 2024-08-25 RX ADMIN — DOCUSATE SODIUM 100 MG: 100 CAPSULE, LIQUID FILLED ORAL at 17:16

## 2024-08-25 RX ADMIN — ATORVASTATIN CALCIUM 40 MG: 40 TABLET, FILM COATED ORAL at 17:16

## 2024-08-25 NOTE — ASSESSMENT & PLAN NOTE
Left facial droop with Concern for bells palsy ,started on prednisone 40mg BID x 7 days  As Symptoms were noted within last 24 hour, MRI negative for acute CVA, chronic lacunar CVA noted  Goal normotension- start amlodipine 5mg   Continue aspirin and statin  Check lipid

## 2024-08-25 NOTE — PLAN OF CARE
Problem: PAIN - ADULT  Goal: Verbalizes/displays adequate comfort level or baseline comfort level  Description: Interventions:  - Encourage patient to monitor pain and request assistance  - Assess pain using appropriate pain scale  - Administer analgesics based on type and severity of pain and evaluate response  - Implement non-pharmacological measures as appropriate and evaluate response  - Consider cultural and social influences on pain and pain management  - Notify physician/advanced practitioner if interventions unsuccessful or patient reports new pain  Outcome: Progressing     Problem: INFECTION - ADULT  Goal: Absence or prevention of progression during hospitalization  Description: INTERVENTIONS:  - Assess and monitor for signs and symptoms of infection  - Monitor lab/diagnostic results  - Monitor all insertion sites, i.e. indwelling lines, tubes, and drains  - Monitor endotracheal if appropriate and nasal secretions for changes in amount and color  - [place] appropriate cooling/warming therapies per order  - Administer medications as ordered  - Instruct and encourage patient and family to use good hand hygiene technique  - Identify and instruct in appropriate isolation precautions for identified infection/condition  Outcome: Progressing  Goal: Absence of fever/infection during neutropenic period  Description: INTERVENTIONS:  - Monitor WBC    Outcome: Progressing     Problem: SAFETY ADULT  Goal: Patient will remain free of falls  Description: INTERVENTIONS:  - Educate patient/family on patient safety including physical limitations  - Instruct patient to call for assistance with activity   - Consult OT/PT to assist with strengthening/mobility   - Keep Call bell within reach  - Keep bed low and locked with side rails adjusted as appropriate  - Keep care items and personal belongings within reach  - Initiate and maintain comfort rounds  - Make Fall Risk Sign visible to staff  - Offer Toileting every  Hours,  in advance of need  - Initiate/Maintainalarm  - Obtain necessary fall risk management equipment:   - Apply yellow socks and bracelet for high fall risk patients  - Consider moving patient to room near nurses station  Outcome: Progressing  Goal: Maintain or return to baseline ADL function  Description: INTERVENTIONS:  -  Assess patient's ability to carry out ADLs; assess patient's baseline for ADL function and identify physical deficits which impact ability to perform ADLs (bathing, care of mouth/teeth, toileting, grooming, dressing, etc.)  - Assess/evaluate cause of self-care deficits   - Assess range of motion  - Assess patient's mobility; develop plan if impaired  - Assess patient's need for assistive devices and provide as appropriate  - Encourage maximum independence but intervene and supervise when necessary  - Involve family in performance of ADLs  - Assess for home care needs following discharge   - Consider OT consult to assist with ADL evaluation and planning for discharge  - Provide patient education as appropriate  Outcome: Progressing  Goal: Maintains/Returns to pre admission functional level  Description: INTERVENTIONS:  - Perform AM-PAC 6 Click Basic Mobility/ Daily Activity assessment daily.  - Set and communicate daily mobility goal to care team and patient/family/caregiver.   - Collaborate with rehabilitation services on mobility goals if consulted  - Perform Range of Motion times a day.  - Reposition patient every  hours.  - Dangle patient  times a day  - Stand patient times a day  - Ambulate patient  times a day  - Out of bed to chair  times a day   - Out of bed for meals  times a day  - Out of bed for toileting  - Record patient progress and toleration of activity level   Outcome: Progressing     Problem: DISCHARGE PLANNING  Goal: Discharge to home or other facility with appropriate resources  Description: INTERVENTIONS:  - Identify barriers to discharge w/patient and caregiver  - Arrange for  needed discharge resources and transportation as appropriate  - Identify discharge learning needs (meds, wound care, etc.)  - Arrange for interpretive services to assist at discharge as needed  - Refer to Case Management Department for coordinating discharge planning if the patient needs post-hospital services based on physician/advanced practitioner order or complex needs related to functional status, cognitive ability, or social support system  Outcome: Progressing     Problem: Knowledge Deficit  Goal: Patient/family/caregiver demonstrates understanding of disease process, treatment plan, medications, and discharge instructions  Description: Complete learning assessment and assess knowledge base.  Interventions:  - Provide teaching at level of understanding  - Provide teaching via preferred learning methods  Outcome: Progressing     Problem: NEUROSENSORY - ADULT  Goal: Achieves stable or improved neurological status  Description: INTERVENTIONS  - Monitor and report changes in neurological status  - Monitor vital signs such as temperature, blood pressure, glucose, and any other labs ordered   - Initiate measures to prevent increased intracranial pressure  - Monitor for seizure activity and implement precautions if appropriate      Outcome: Progressing  Goal: Remains free of injury related to seizures activity  Description: INTERVENTIONS  - Maintain airway, patient safety  and administer oxygen as ordered  - Monitor patient for seizure activity, document and report duration and description of seizure to physician/advanced practitioner  - If seizure occurs,  ensure patient safety during seizure  - Reorient patient post seizure  - Seizure pads on all 4 side rails  - Instruct patient/family to notify RN of any seizure activity including if an aura is experienced  - Instruct patient/family to call for assistance with activity based on nursing assessment  - Administer anti-seizure medications if ordered    Outcome:  Progressing  Goal: Achieves maximal functionality and self care  Description: INTERVENTIONS  - Monitor swallowing and airway patency with patient fatigue and changes in neurological status  - Encourage and assist patient to increase activity and self care.   - Encourage visually impaired, hearing impaired and aphasic patients to use assistive/communication devices  Outcome: Progressing     Problem: CARDIOVASCULAR - ADULT  Goal: Maintains optimal cardiac output and hemodynamic stability  Description: INTERVENTIONS:  - Monitor I/O, vital signs and rhythm  - Monitor for S/S and trends of decreased cardiac output  - Administer and titrate ordered vasoactive medications to optimize hemodynamic stability  - Assess quality of pulses, skin color and temperature  - Assess for signs of decreased coronary artery perfusion  - Instruct patient to report change in severity of symptoms  Outcome: Progressing  Goal: Absence of cardiac dysrhythmias or at baseline rhythm  Description: INTERVENTIONS:  - Continuous cardiac monitoring, vital signs, obtain 12 lead EKG if ordered  - Administer antiarrhythmic and heart rate control medications as ordered  - Monitor electrolytes and administer replacement therapy as ordered  Outcome: Progressing     Problem: RESPIRATORY - ADULT  Goal: Achieves optimal ventilation and oxygenation  Description: INTERVENTIONS:  - Assess for changes in respiratory status  - Assess for changes in mentation and behavior  - Position to facilitate oxygenation and minimize respiratory effort  - Oxygen administered by appropriate delivery if ordered  - Initiate smoking cessation education as indicated  - Encourage broncho-pulmonary hygiene including cough, deep breathe, Incentive Spirometry  - Assess the need for suctioning and aspirate as needed  - Assess and instruct to report SOB or any respiratory difficulty  - Respiratory Therapy support as indicated  Outcome: Progressing     Problem: GASTROINTESTINAL -  ADULT  Goal: Minimal or absence of nausea and/or vomiting  Description: INTERVENTIONS:  - Administer IV fluids if ordered to ensure adequate hydration  - Maintain NPO status until nausea and vomiting are resolved  - Nasogastric tube if ordered  - Administer ordered antiemetic medications as needed  - Provide nonpharmacologic comfort measures as appropriate  - Advance diet as tolerated, if ordered  - Consider nutrition services referral to assist patient with adequate nutrition and appropriate food choices  Outcome: Progressing  Goal: Maintains or returns to baseline bowel function  Description: INTERVENTIONS:  - Assess bowel function  - Encourage oral fluids to ensure adequate hydration  - Administer IV fluids if ordered to ensure adequate hydration  - Administer ordered medications as needed  - Encourage mobilization and activity  - Consider nutritional services referral to assist patient with adequate nutrition and appropriate food choices  Outcome: Progressing  Goal: Maintains adequate nutritional intake  Description: INTERVENTIONS:  - Monitor percentage of each meal consumed  - Identify factors contributing to decreased intake, treat as appropriate  - Assist with meals as needed  - Monitor I&O, weight, and lab values if indicated  - Obtain nutrition services referral as needed  Outcome: Progressing  Goal: Establish and maintain optimal ostomy function  Description: INTERVENTIONS:  - Assess bowel function  - Encourage oral fluids to ensure adequate hydration  - Administer IV fluids if ordered to ensure adequate hydration   - Administer ordered medications as needed  - Encourage mobilization and activity  - Nutrition services referral to assist patient with appropriate food choices  - Assess stoma site  - Consider wound care consult   Outcome: Progressing  Goal: Oral mucous membranes remain intact  Description: INTERVENTIONS  - Assess oral mucosa and hygiene practices  - Implement preventative oral hygiene  regimen  - Implement oral medicated treatments as ordered  - Initiate Nutrition services referral as needed  Outcome: Progressing     Problem: GENITOURINARY - ADULT  Goal: Maintains or returns to baseline urinary function  Description: INTERVENTIONS:  - Assess urinary function  - Encourage oral fluids to ensure adequate hydration if ordered  - Administer IV fluids as ordered to ensure adequate hydration  - Administer ordered medications as needed  - Offer frequent toileting  - Follow urinary retention protocol if ordered  Outcome: Progressing  Goal: Absence of urinary retention  Description: INTERVENTIONS:  - Assess patient’s ability to void and empty bladder  - Monitor I/O  - Bladder scan as needed  - Discuss with physician/AP medications to alleviate retention as needed  - Discuss catheterization for long term situations as appropriate  Outcome: Progressing  Goal: Urinary catheter remains patent  Description: INTERVENTIONS:  - Assess patency of urinary catheter  - If patient has a chronic herzog, consider changing catheter if non-functioning  - Follow guidelines for intermittent irrigation of non-functioning urinary catheter  Outcome: Progressing     Problem: METABOLIC, FLUID AND ELECTROLYTES - ADULT  Goal: Electrolytes maintained within normal limits  Description: INTERVENTIONS:  - Monitor labs and assess patient for signs and symptoms of electrolyte imbalances  - Administer electrolyte replacement as ordered  - Monitor response to electrolyte replacements, including repeat lab results as appropriate  - Instruct patient on fluid and nutrition as appropriate  Outcome: Progressing  Goal: Fluid balance maintained  Description: INTERVENTIONS:  - Monitor labs   - Monitor I/O and WT  - Instruct patient on fluid and nutrition as appropriate  - Assess for signs & symptoms of volume excess or deficit  Outcome: Progressing  Goal: Glucose maintained within target range  Description: INTERVENTIONS:  - Monitor Blood Glucose as  ordered  - Assess for signs and symptoms of hyperglycemia and hypoglycemia  - Administer ordered medications to maintain glucose within target range  - Assess nutritional intake and initiate nutrition service referral as needed  Outcome: Progressing     Problem: SKIN/TISSUE INTEGRITY - ADULT  Goal: Skin Integrity remains intact(Skin Breakdown Prevention)  Description: Assess:  -Perform Fausto assessment every  -Clean and moisturize skin every   -Inspect skin when repositioning, toileting, and assisting with ADLS  -Assess under medical devices such as  every   -Assess extremities for adequate circulation and sensation     Bed Management:  -Have minimal linens on bed & keep smooth, unwrinkled  -Change linens as needed when moist or perspiring  -Avoid sitting or lying in one position for more than  hours while in bed  -Keep HOB atdegrees     Toileting:  -Offer bedside commode  -Assess for incontinence every   -Use incontinent care products after each incontinent episode such as     Activity:  -Mobilize patient  times a day  -Encourage activity and walks on unit  -Encourage or provide ROM exercises   -Turn and reposition patient every Hours  -Use appropriate equipment to lift or move patient in bed  -Instruct/ Assist with weight shifting every  when out of bed in chair  -Consider limitation of chair time  hour intervals    Skin Care:  -Avoid use of baby powder, tape, friction and shearing, hot water or constrictive clothing  -Relieve pressure over bony prominences using   -Do not massage red bony areas    Next Steps:  -Teach patient strategies to minimize risks such as    -Consider consults to  interdisciplinary teams such as   Outcome: Progressing  Goal: Incision(s), wounds(s) or drain site(s) healing without S/S of infection  Description: INTERVENTIONS  - Assess and document dressing, incision, wound bed, drain sites and surrounding tissue  - Provide patient and family education  - Perform skin care/dressing changes  every   Outcome: Progressing  Goal: Pressure injury heals and does not worsen  Description: Interventions:  - Implement low air loss mattress or specialty surface (Criteria met)  - Apply silicone foam dressing  - Instruct/assist with weight shifting every  minutes when in chair   - Limit chair time to  hour intervals  - Use special pressure reducing interventions such as when in chair   - Apply fecal or urinary incontinence containment device   - Perform passive or active ROM every   - Turn and reposition patient & offload bony prominences every  hours   - Utilize friction reducing device or surface for transfers   - Consider consults to  interdisciplinary teams such as   - Use incontinent care products after each incontinent episode such as  - Consider nutrition services referral as needed  Outcome: Progressing     Problem: HEMATOLOGIC - ADULT  Goal: Maintains hematologic stability  Description: INTERVENTIONS  - Assess for signs and symptoms of bleeding or hemorrhage  - Monitor labs  - Administer supportive blood products/factors as ordered and appropriate  Outcome: Progressing     Problem: MUSCULOSKELETAL - ADULT  Goal: Maintain or return mobility to safest level of function  Description: INTERVENTIONS:  - Assess patient's ability to carry out ADLs; assess patient's baseline for ADL function and identify physical deficits which impact ability to perform ADLs (bathing, care of mouth/teeth, toileting, grooming, dressing, etc.)  - Assess/evaluate cause of self-care deficits   - Assess range of motion  - Assess patient's mobility  - Assess patient's need for assistive devices and provide as appropriate  - Encourage maximum independence but intervene and supervise when necessary  - Involve family in performance of ADLs  - Assess for home care needs following discharge   - Consider OT consult to assist with ADL evaluation and planning for discharge  - Provide patient education as appropriate  Outcome: Progressing  Goal:  Maintain proper alignment of affected body part  Description: INTERVENTIONS:  - Support, maintain and protect limb and body alignment  - Provide patient/ family with appropriate education  Outcome: Progressing     Problem: Nutrition/Hydration-ADULT  Goal: Nutrient/Hydration intake appropriate for improving, restoring or maintaining nutritional needs  Description: Monitor and assess patient's nutrition/hydration status for malnutrition. Collaborate with interdisciplinary team and initiate plan and interventions as ordered.  Monitor patient's weight and dietary intake as ordered or per policy. Utilize nutrition screening tool and intervene as necessary. Determine patient's food preferences and provide high-protein, high-caloric foods as appropriate.     INTERVENTIONS:  - Monitor oral intake, urinary output, labs, and treatment plans  - Assess nutrition and hydration status and recommend course of action  - Evaluate amount of meals eaten  - Assist patient with eating if necessary   - Allow adequate time for meals  - Recommend/ encourage appropriate diets, oral nutritional supplements, and vitamin/mineral supplements  - Order, calculate, and assess calorie counts as needed  - Recommend, monitor, and adjust tube feedings and TPN/PPN based on assessed needs  - Assess need for intravenous fluids  - Provide specific nutrition/hydration education as appropriate  - Include patient/family/caregiver in decisions related to nutrition  Outcome: Progressing     Problem: Neurological Deficit  Goal: Neurological status is stable or improving  Description: Interventions:  - Monitor and assess patient's level of consciousness, motor function, sensory function, and level of assistance needed for ADLs.   - Monitor and report changes from baseline. Collaborate with interdisciplinary team to initiate plan and implement interventions as ordered.   - Provide and maintain a safe environment.  - Consider seizure precautions.  - Consider fall  precautions.  - Consider aspiration precautions.  - Consider bleeding precautions.  Outcome: Progressing     Problem: Activity Intolerance/Impaired Mobility  Goal: Mobility/activity is maintained at optimum level for patient  Description: Interventions:  - Assess and monitor patient  barriers to mobility and need for assistive/adaptive devices.  - Assess patient's emotional response to limitations.  - Collaborate with interdisciplinary team and initiate plans and interventions as ordered.  - Encourage independent activity per ability.  - Maintain proper body alignment.  - Perform active/passive rom as tolerated/ordered.  - Plan activities to conserve energy.  - Turn patient as appropriate  Outcome: Progressing     Problem: Communication Impairment  Goal: Ability to express needs and understand communication  Description: Assess patient's communication skills and ability to understand information.  Patient will demonstrate use of effective communication techniques, alternative methods of communication and understanding even if not able to speak.     - Encourage communication and provide alternate methods of communication as needed.  - Collaborate with case management/ for discharge needs.  - Include patient/family/caregiver in decisions related to communication.  Outcome: Progressing     Problem: Potential for Aspiration  Goal: Non-ventilated patient's risk of aspiration is minimized  Description: Assess and monitor vital signs, respiratory status, and labs (WBC).  Monitor for signs of aspiration (tachypnea, cough, rales, wheezing, cyanosis, fever).    - Assess and monitor patient's ability to swallow.  - Place patient up in chair to eat if possible.  - HOB up at 90 degrees to eat if unable to get patient up into chair.  - Supervise patient during oral intake.   - Instruct patient/ family to take small bites.  - Instruct patient/ family to take small single sips when taking liquids.  - Follow  patient-specific strategies generated by speech pathologist.  Outcome: Progressing  Goal: Ventilated patient's risk of aspiration is minimized  Description: Assess and monitor vital signs, respiratory status, airway cuff pressure, and labs (WBC).  Monitor for signs of aspiration (tachypnea, cough, rales, wheezing, cyanosis, fever).    - Elevate head of bed 30 degrees if patient has tube feeding.  - Monitor tube feeding.  Outcome: Progressing     Problem: Nutrition  Goal: Nutrition/Hydration status is improving  Description: Monitor and assess patient's nutrition/hydration status for malnutrition (ex- brittle hair, bruises, dry skin, pale skin and conjunctiva, muscle wasting, smooth red tongue, and disorientation). Collaborate with interdisciplinary team and initiate plan and interventions as ordered.  Monitor patient's weight and dietary intake as ordered or per policy. Utilize nutrition screening tool and intervene per policy. Determine patient's food preferences and provide high-protein, high-caloric foods as appropriate.     - Assist patient with eating.  - Allow adequate time for meals.  - Encourage patient to take dietary supplement as ordered.  - Collaborate with clinical nutritionist.  - Include patient/family/caregiver in decisions related to nutrition.  Outcome: Progressing

## 2024-08-25 NOTE — CASE MANAGEMENT
Case Management Assessment & Discharge Planning Note    Patient name Heidi Pate  Location 2 [facility] 283/2 E 283-01 MRN [id number]  : 1960 Date 2024       Current Admission Date: 2024  Current Admission Diagnosis:Hypertensive urgency   Patient Active Problem List    Diagnosis Date Noted Date Diagnosed    Elevated hemoglobin A1c 2024     Hypertensive urgency 2024     Facial droop 2024     SIRS (systemic inflammatory response syndrome) (HCC) 2024     Iron deficiency anemia secondary to inadequate dietary iron intake      Closed fracture of proximal end of right humerus with routine healing 10/06/2020     Morbid obesity (HCC) 2017     Rotator cuff arthropathy, left 2017       LOS (days): 0  Geometric Mean LOS (GMLOS) (days):   Days to GMLOS:     OBJECTIVE:              Current admission status: Observation       Preferred Pharmacy:   [id number] [address] [address]  Phone: [phone] Fax: [phone]    [facility]/pharmacy #[id number] [place], [place] - [address] [place] [address]  Phone: [phone] Fax: [phone]    [facility] [id number] [place], [place] - [address] [address]  Phone: [phone] Fax: [phone]    Primary Care Provider: Mariella Thomason DO    Primary Insurance: SETH TAYLOR  Secondary Insurance:     ASSESSMENT:  Active Health Care Proxies       RioUlisesJing Health Care Representative - Daughter   Primary Phone: [phone] (Mobile)                 Advance Directives  Does patient have a Health Care POA?: Yes  Does patient have Advance Directives?: Yes  Advance Directives: Living will, Power of  for health care  Primary Contact:  Mikey    Trinh Notice Signed:  (not on workqueue)    Readmission Root Cause  30 Day  Readmission: No    Patient Information  Admitted from:: Home  Mental Status: Alert  During Assessment patient was accompanied by: Not accompanied during assessment  Assessment information provided by:: Patient  Primary Caregiver: Self  Support Systems: Spouse/significant other, Daughter  County of Residence: [place]  What city [place] you [place] in[place]  Home entry access options. Select all that apply.: Stairs  Number of steps to enter home.: 1  Do the steps have railings?: Yes  Type of Current Residence: 2 [place] home  Upon entering residence, is there a bedroom on the main floor (no further steps)?: Yes  Upon entering residence, is there a bathroom on the main floor (no further steps)?: Yes  Living Arrangements: Lives w/ Spouse/significant other, Lives w/ Daughter (lives with  Mikey and 2 daughters-Jing and Cristiana)  Is patient a ?: No    Activities of Daily Living Prior to Admission  Functional Status: Assistance  Completes ADLs independently?: No  Level of ADL dependence: Assistance (Pt is assisted by her daugher)  Ambulates independently?: Yes  Does patient use assisted devices?: No  Does patient currently own DME?: Yes  What DME does the patient currently own?: Walker  Does patient have a history of Outpatient Therapy (PT/OT)?: Yes (following shoulder surgery)  Does the patient have a history of Short-Term Rehab?: No  Does patient have a history of HHC?: No  Does patient currently have HHC?: No         Patient Information Continued  Income Source: Unemployed  Does patient have prescription coverage?: Yes  Does patient receive dialysis treatments?: No  Does patient have a history of substance abuse?: No  Does patient have a history of Mental Health Diagnosis?: No    PHQ 2/9 Screening   Reviewed PHQ 2/9 Depression Screening Score?: No    Means of Transportation  Means of Transport to Appts:: Drives Self      Social Determinants of Health (SDOH)      Flowsheet Row Most Recent Value   Housing  Stability    In the last 12 months, was there a time when you were not able to pay the mortgage or rent on time? N   In the past 12 months, how many times have you moved where you were living? 0   At any time in the past 12 months, were you homeless or living in a shelter (including now)? N   Transportation Needs    In the past 12 months, has lack of transportation kept you from medical appointments or from getting medications? no   In the past 12 months, has lack of transportation kept you from meetings, work, or from getting things needed for daily living? No   Food Insecurity    Within the past 12 months, you worried that your food would run out before you got the money to buy more. Never true   Within the past 12 months, the food you bought just didn't last and you didn't have money to get more. Never true   Utilities    In the past 12 months has the electric, gas, oil, or water company threatened to shut off services in your home? No            DISCHARGE DETAILS:    Discharge planning discussed with:: patient  Freedom of Choice: Yes  Comments - Freedom of Choice: CM discussed d/c needs and pt is agreeable to [facility] for nursing.  Referrals placed  CM contacted family/caregiver?: No- see comments (pt will update her family herself)  Were Treatment Team discharge recommendations reviewed with patient/caregiver?: Yes  Did patient/caregiver verbalize understanding of patient care needs?: Yes  Were patient/caregiver advised of the risks associated with not following Treatment Team discharge recommendations?: Yes    Contacts  Patient Contacts: Mikey  Relationship to Patient:: Family    Requested Home Health Care         Is the patient interested in [facility] at discharge?: Yes  Home Health Discipline requested:: Nursing  Home Health Follow-Up Provider:: PCP  Home Health Services Needed:: Evaluate Functional Status and Safety, Other (comment) (vital sign f/u especially BP readings)  Homebound Criteria Met:: Uses an Assist  Device (i.e. cane, walker, etc), Requires the Assistance of Another Person for Safe Ambulation or to Leave the Home  Supporting Clincal Findings:: Fatigues Easliy in Short Distances    DME Referral Provided  Referral made for DME?: No    Other Referral/Resources/Interventions Provided:  Interventions: HHC  Referral Comments: [facility] pended for nursing    Would you like to participate in our [facility] service program?  : No - Declined    Treatment Team Recommendation: Home with Home Health Care  Discharge Destination Plan:: Home with Home Health Care  Transport at Discharge : Family

## 2024-08-25 NOTE — UTILIZATION REVIEW
Initial Clinical Review    OBSERVATION  8/24 @  1837 CHANGED TO IP ADMISSION 8/25 @  1329  The patient will continue to require additional inpatient hospital stay due to uncontrolled BP     Admission: Date/Time/Statement:   Admission Orders (From admission, onward)       Ordered        08/24/24 1836  Place in Observation  Once                          08/25/24 1329  INPATIENT ADMISSION  Once        Transfer Service: Hospitalist   Question Answer Comment   Level of Care Med Surg    Estimated length of stay More than 2 Midnights    Certification I certify that inpatient services are medically necessary for this patient for a duration of greater than two midnights. See H&P and MD Progress Notes for additional information about the patient's course of treatment.        08/25/24 1328     ED Arrival Information       Expected   -    Arrival   8/24/2024 17:15    Acuity   Emergent              Means of arrival   Walk-In    Escorted by   Family Member    Service   Hospitalist    Admission type   Emergency              Arrival complaint   possible stroke             Chief Complaint   Patient presents with    Facial Droop     Pt c/o left sided facial droop that started yesterday, pt denies any weakness in extremities        Initial Presentation: 63 y.o. female presents to ED from home with left sided facial droop,  noticed the day prior to admission. No other  neuro deficits.  PMH is  smoker.   BP  elevated in ED.  CT scan shows possible  subacute stroke.    Met SIRS  criteria with tachycardia and  leukocytosis.   Admit  Observation with  Facial droop, SIRS  and  Hypertensive  urgency and plan is   aspirin/statin, monitor labs and  BP, hold antibiotics for now, MRI brain and neuro checks.    8/25  IP ADMISSION  MRI  negative.   Starting norvasc, new diagnosis  hypertension.  Unable to close left eye, no extremity weakness, speech affected  due  to  facial droop.  No slurring.    ED Triage Vitals   Temperature Pulse  Respirations Blood Pressure SpO2 Pain Score   08/24/24 1719 08/24/24 1719 08/24/24 1719 08/24/24 1719 08/24/24 1719 08/24/24 1857   98.1 °F (36.7 °C) 92 20 (!) 187/119 98 % No Pain     Weight (last 2 days)       Date/Time Weight    08/24/24 19:33:24 116 (255)    08/24/24 1719 116 (255)            Vital Signs (last 3 days)       Date/Time Temp Pulse Resp BP MAP (mmHg) SpO2 O2 Device Patient Position - Orthostatic VS Corwith Coma Scale Score Pain    08/25/24 07:24:56 98.1 °F (36.7 °C) 80 -- 147/84 105 95 % -- -- -- --    08/25/24 0630 98.1 °F (36.7 °C) 78 16 152/77 102 95 % -- Lying 15 --    08/25/24 0430 -- 76 16 144/83 103 95 % -- Lying 15 --    08/25/24 0230 -- 77 16 135/80 98 95 % None (Room air) Lying 15 --    08/25/24 0030 97.5 °F (36.4 °C) 81 16 146/79 101 96 % None (Room air) Lying 15 --    08/24/24 23:05:07 -- 81 -- -- -- 92 % -- -- -- --    08/24/24 2230 97.5 °F (36.4 °C) 89 16 131/71 91 96 % None (Room air) Lying 15 --    08/24/24 2130 97.5 °F (36.4 °C) 79 18 129/68 88 92 % None (Room air) Lying 15 --    08/24/24 2030 97.7 °F (36.5 °C) 84 18 146/63 91 96 % None (Room air) Lying 15 --    08/24/24 19:33:24 98.5 °F (36.9 °C) 88 18 146/85 105 97 % None (Room air) Sitting -- --    08/24/24 1930 98.5 °F (36.9 °C) -- 18 146/85 105 -- None (Room air) Sitting 15 No Pain    08/24/24 1857 -- 81 20 195/127 -- 98 % -- Lying -- No Pain    08/24/24 1830 -- -- -- -- -- -- -- -- 15 --    08/24/24 1815 -- 96 20 195/109 -- 98 % -- -- -- --    08/24/24 1719 98.1 °F (36.7 °C) 92 20 187/119 -- 98 % None (Room air) Sitting -- --              Pertinent Labs/Diagnostic Test Results:   Radiology:  MRI  brain No acute infarction.     Mild cerebral and pontine chronic microangiopathic changes.     Few scattered chronic lacunar infarctions within the left more than right caudate and right putamen.   CT head without contrast   Final Interpretation by Edenilson Lerma MD (08/24 1822)      Multiple foci of white matter hypoattenuation, may  be the sequela of chronic small vessel ischemic disease but in absence of comparison imaging MRI is recommended if there is concern for acute infarction.         The study was marked in EPIC for immediate notification.                  Workstation performed: [id number]         MRI inpatient order    (Results Pending)           Results from last 7 days   Lab Units 08/25/24  0605 08/24/24  1734   WBC Thousand/uL 8.94 10.76*   HEMOGLOBIN g/dL 11.4* 13.0   HEMATOCRIT % 38.7 44.3   PLATELETS Thousands/uL 304 384         Results from last 7 days   Lab Units 08/25/24  0605 08/24/24  1734   SODIUM mmol/L 139 137   POTASSIUM mmol/L 3.9 4.8   CHLORIDE mmol/L 104 103   CO2 mmol/L 29 25   ANION GAP mmol/L 6 9   BUN mg/dL 15 16   CREATININE mg/dL 0.71 0.76   EGFR ml/min/1.73sq m 92 84   CALCIUM mg/dL 9.0 9.4   MAGNESIUM mg/dL 2.1  --          Results from last 7 days   Lab Units 08/24/24  1727   POC GLUCOSE mg/dl 95     Results from last 7 days   Lab Units 08/25/24  0605 08/24/24  1734   GLUCOSE RANDOM mg/dL 108 92         Results from last 7 days   Lab Units 08/24/24  1734   HEMOGLOBIN A1C % 6.7*   EAG mg/dl 146           Results from last 7 days   Lab Units 08/25/24  0605   PROCALCITONIN ng/ml 0.06                   ED Treatment-Medication Administration from 08/24/2024 1715 to 08/24/2024 1924         Date/Time Order Dose Route Action     08/24/2024 1735 hydrALAZINE (APRESOLINE) injection 10 mg 10 mg Intravenous Given     08/24/2024 1832 labetalol (NORMODYNE) injection 10 mg 10 mg Intravenous Given              Admitting Diagnosis: Bell's palsy [G51.0]  Facial droop [R29.810]  Uncontrolled hypertension [I10]  Age/Sex: 63 y.o. female  Admission Orders:  Scheduled Medications:  aspirin, 81 mg, Oral, Daily  atorvastatin, 40 mg, Oral, Daily With Dinner  docusate sodium, 100 mg, Oral, BID  heparin (porcine), 7,500 Units, Subcutaneous, Q8H EVON  nicotine, 1 patch, Transdermal, Daily  senna, 1 tablet, Oral, Daily      Continuous IV  Infusions:     PRN Meds:  acetaminophen, 650 mg, Oral, Q6H PRN  calcium carbonate, 1,000 mg, Oral, Daily PRN  ondansetron, 4 mg, Intravenous, Q6H PRN    24 hr tele  Neuro checks  Stroke teaching  Dysphagia eval    IP CONSULT TO CASE MANAGEMENT  IP CONSULT TO NUTRITION SERVICES    Network Utilization Review Department  ATTENTION: Please call with any questions or concerns to [phone] and carefully listen to the prompts so that you are directed to the right person. All voicemails are confidential.   For Discharge needs, contact Care Management DC Support Team at [phone] opt. 2  Send all requests for admission clinical reviews, approved or denied determinations and any other requests to dedicated fax number below belonging to the campus where the patient is receiving treatment. List of dedicated fax numbers for the Facilities:  FACILITY NAME UR FAX NUMBER   ADMISSION DENIALS (Administrative/Medical Necessity) [phone]   DISCHARGE SUPPORT TEAM (NETWORK) [phone]   PARENT CHILD HEALTH (Maternity/NICU/Pediatrics) [phone]   [facility] [phone]   [facility] [phone]   [facility] [phone]   [facility] [phone]   [facility] [phone]   [facility] [phone]   [facility] [phone]   [facility] [phone]   [facility] [phone]   [facility] [phone]   [facility] [phone]   [facility] [phone]

## 2024-08-25 NOTE — PLAN OF CARE
Problem: PAIN - ADULT  Goal: Verbalizes/displays adequate comfort level or baseline comfort level  Description: Interventions:  - Encourage patient to monitor pain and request assistance  - Assess pain using appropriate pain scale  - Administer analgesics based on type and severity of pain and evaluate response  - Implement non-pharmacological measures as appropriate and evaluate response  - Consider cultural and social influences on pain and pain management  - Notify physician/advanced practitioner if interventions unsuccessful or patient reports new pain  Outcome: Progressing     Problem: INFECTION - ADULT  Goal: Absence or prevention of progression during hospitalization  Description: INTERVENTIONS:  - Assess and monitor for signs and symptoms of infection  - Monitor lab/diagnostic results  - Monitor all insertion sites, i.e. indwelling lines, tubes, and drains  - Monitor endotracheal if appropriate and nasal secretions for changes in amount and color  - [place] appropriate cooling/warming therapies per order  - Administer medications as ordered  - Instruct and encourage patient and family to use good hand hygiene technique  - Identify and instruct in appropriate isolation precautions for identified infection/condition  Outcome: Progressing  Goal: Absence of fever/infection during neutropenic period  Description: INTERVENTIONS:  - Monitor WBC    Outcome: Progressing     Problem: DISCHARGE PLANNING  Goal: Discharge to home or other facility with appropriate resources  Description: INTERVENTIONS:  - Identify barriers to discharge w/patient and caregiver  - Arrange for needed discharge resources and transportation as appropriate  - Identify discharge learning needs (meds, wound care, etc.)  - Arrange for interpretive services to assist at discharge as needed  - Refer to Case Management Department for coordinating discharge planning if the patient needs post-hospital services based on physician/advanced  practitioner order or complex needs related to functional status, cognitive ability, or social support system  Outcome: Progressing     Problem: DISCHARGE PLANNING  Goal: Discharge to home or other facility with appropriate resources  Description: INTERVENTIONS:  - Identify barriers to discharge w/patient and caregiver  - Arrange for needed discharge resources and transportation as appropriate  - Identify discharge learning needs (meds, wound care, etc.)  - Arrange for interpretive services to assist at discharge as needed  - Refer to Case Management Department for coordinating discharge planning if the patient needs post-hospital services based on physician/advanced practitioner order or complex needs related to functional status, cognitive ability, or social support system  Outcome: Progressing     Problem: Knowledge Deficit  Goal: Patient/family/caregiver demonstrates understanding of disease process, treatment plan, medications, and discharge instructions  Description: Complete learning assessment and assess knowledge base.  Interventions:  - Provide teaching at level of understanding  - Provide teaching via preferred learning methods  Outcome: Progressing

## 2024-08-25 NOTE — PROGRESS NOTES
[facility]  Progress Note  Name: Heidi Pate I  MRN: [id number]  Unit/Bed#: 2 E 283-01 I Date of Admission: 8/24/2024   Date of Service: 8/25/2024 I Hospital Day: 0    Assessment & Plan   Facial droop  Assessment & Plan  Left facial droop with Concern for bells palsy ,started on prednisone 40mg BID x 7 days  As Symptoms were noted within last 24 hour, MRI negative for acute CVA, chronic lacunar CVA noted  Goal normotension- start amlodipine 5mg   Continue aspirin and statin  Check lipid    Elevated hemoglobin A1c  Assessment & Plan  One reading above 6.5%  Recheck in couple months as OP  Blood glucose stable this AM    SIRS (systemic inflammatory response syndrome) (HCC)  Assessment & Plan  Resolved  Normal procal      Hypertensive urgency  Assessment & Plan  Present on ED arrival , now resolved  Not on hypertensive meds at home  Possible new diagnosis   Start amlodipine     Morbid obesity (HCC)  Assessment & Plan  Recommend incorporating a more whole foods plant-predominant diet along with decreasing consumption of red meats and processed foods  Per AHA guidelines, recommend moderate-vigorous intensity exercise for 30 minutes a day for 5 days a week or a total of 150 min/week  Counseled on lifestyle changes for >3 min                 VTE Pharmacologic Prophylaxis:    heparin    Mobility:   Basic Mobility Inpatient Raw Score: 24  JH-HLM Goal: 8: Walk 250 feet or more  JH-HLM Achieved: 6: Walk 10 steps or more  JH-HLM Goal achieved. Continue to encourage appropriate mobility.    Patient Centered Rounds: I performed bedside rounds with nursing staff today.   Discussions with Specialists or Other Care Team Provider: CM    Education and Discussions with Family / Patient: Updated  ( and daughter) at bedside.    Total Time Spent on Date of Encounter in care of patient: 50 mins. This time was spent on one or more of the following: performing physical exam; counseling  and coordination of care; obtaining or reviewing history; documenting in the medical record; reviewing/ordering tests, medications or procedures; communicating with other healthcare professionals and discussing with patient's family/caregivers.    Current Length of Stay: 0 day(s)  Current Patient Status: Observation   Certification Statement: The patient will continue to require additional inpatient hospital stay due to uncontrolled BP  Discharge Plan: Anticipate discharge tomorrow to home with home services.    Code Status: Level 1 - Full Code    Subjective:   Pt seen and examined at bedside during AM rounds  Pt is resting comfortably and eating her lunch  Discussed MRI findings with the family and the patient  No ear pain    Objective:     Vitals:   Temp (24hrs), Av °F (36.7 °C), Min:97.5 °F (36.4 °C), Max:98.5 °F (36.9 °C)    Temp:  [97.5 °F (36.4 °C)-98.5 °F (36.9 °C)] 98.1 °F (36.7 °C)  HR:  [76-96] 90  Resp:  [16-20] 16  BP: (129-195)/() 186/101  SpO2:  [92 %-99 %] 98 %  Body mass index is 48.18 kg/m².     Input and Output Summary (last 24 hours):   No intake or output data in the 24 hours ending 24 1240    Physical Exam:   Physical Exam General- Awake, alert and oriented x 3, looks comfortable  HEENT- Normocephalic, atraumatic, oral mucosa- moist, no vesicles in th ear, no pain in the ear  Neck- Supple, No carotid bruit, no JVD  CVS- Normal S1/ S2, Regular rate and rhythm, No murmur, No edema  Respiratory system- B/L clear breath sounds, no wheezing  Abdomen- Soft, Non distended, no tenderness, Bowel sound- present 4 quads  Genitourinary- No suprapubic tenderness, No CVA tenderness  Skin- No new bruise or rash  Musculoskeletal- No gross deformity  Psych- No acute psychosis  CNS- left facial droop, unable to close the left eye, no extremity weakness, speech affected due to facial droop but no slurring      Additional Data:     Labs:  Results from last 7 days   Lab Units 24  0605  08/24/24  1734   WBC Thousand/uL 8.94 10.76*   HEMOGLOBIN g/dL 11.4* 13.0   HEMATOCRIT % 38.7 44.3   PLATELETS Thousands/uL 304 384   LYMPHO PCT %  --  32   MONO PCT %  --  5   EOS PCT %  --  0     Results from last 7 days   Lab Units 08/25/24  0605   SODIUM mmol/L 139   POTASSIUM mmol/L 3.9   CHLORIDE mmol/L 104   CO2 mmol/L 29   BUN mg/dL 15   CREATININE mg/dL 0.71   ANION GAP mmol/L 6   CALCIUM mg/dL 9.0   GLUCOSE RANDOM mg/dL 108         Results from last 7 days   Lab Units 08/24/24  1727   POC GLUCOSE mg/dl 95     Results from last 7 days   Lab Units 08/24/24  1734   HEMOGLOBIN A1C % 6.7*     Results from last 7 days   Lab Units 08/25/24  0605   PROCALCITONIN ng/ml 0.06       Lines/Drains:  Invasive Devices       Peripheral Intravenous Line  Duration             Peripheral IV 08/24/24 Distal;Right;Upper;Ventral (anterior) Arm <1 day                      Telemetry:  Telemetry Orders (From admission, onward)               24 Hour Telemetry Monitoring  (ED Bridging Orders Panel)  Continuous x 24 Hours (Telem)        Question:  Reason for 24 Hour Telemetry  Answer:  TIA/Suspected CVA/ Confirmed CVA                     Telemetry Reviewed: Normal Sinus Rhythm  Indication for Continued Telemetry Use: No indication for continued use. Will discontinue.              Imaging: Reviewed radiology reports from this admission including: MRI brain    Recent Cultures (last 7 days):         Last 24 Hours Medication List:   Current Facility-Administered Medications   Medication Dose Route Frequency Provider Last Rate    acetaminophen  650 mg Oral Q6H PRN Alex Sprague MD      amLODIPine  5 mg Oral Daily Kofi Cardenas MD      aspirin  81 mg Oral Daily Alex Sprague MD      atorvastatin  40 mg Oral Daily With Dinner Alex Sprague MD      calcium carbonate  1,000 mg Oral Daily PRN Alex Sprague MD      docusate sodium  100 mg Oral BID Alex Sprague MD      heparin (porcine)  7,500 Units Subcutaneous Q8H EVON  Alex Sprague MD      nicotine  1 patch Transdermal Daily Alex Sprague MD      ondansetron  4 mg Intravenous Q6H PRN Alex Sprague MD      predniSONE  40 mg Oral BID With Meals Kofi Cardenas MD      senna  1 tablet Oral Daily Alex Sprague MD          Today, Patient Was Seen By: Kofi Cardenas MD    **Please Note: This note may have been constructed using a voice recognition system.**

## 2024-08-25 NOTE — ASSESSMENT & PLAN NOTE
Present on ED arrival , now resolved  Not on hypertensive meds at home  Possible new diagnosis   Start amlodipine

## 2024-08-26 ENCOUNTER — APPOINTMENT (INPATIENT)
Dept: NON INVASIVE DIAGNOSTICS | Facility: HOSPITAL | Age: 64
DRG: 074 | End: 2024-08-26
Payer: COMMERCIAL

## 2024-08-26 VITALS
TEMPERATURE: 97.7 F | WEIGHT: 255 LBS | HEIGHT: 61 IN | OXYGEN SATURATION: 95 % | HEART RATE: 88 BPM | BODY MASS INDEX: 48.15 KG/M2 | DIASTOLIC BLOOD PRESSURE: 87 MMHG | RESPIRATION RATE: 18 BRPM | SYSTOLIC BLOOD PRESSURE: 155 MMHG

## 2024-08-26 LAB
AORTIC ROOT: 2.5 CM
APICAL FOUR CHAMBER EJECTION FRACTION: 59 %
BSA FOR ECHO PROCEDURE: 2.09 M2
E WAVE DECELERATION TIME: 235 MS
E/A RATIO: 0.8
FRACTIONAL SHORTENING: 37 (ref 28–44)
INTERVENTRICULAR SEPTUM IN DIASTOLE (PARASTERNAL SHORT AXIS VIEW): 1.1 CM
INTERVENTRICULAR SEPTUM: 1.1 CM (ref 0.6–1.1)
LAAS-AP2: 16.5 CM2
LAAS-AP4: 17.2 CM2
LEFT ATRIUM AREA SYSTOLE SINGLE PLANE A4C: 16.4 CM2
LEFT ATRIUM SIZE: 4.3 CM
LEFT ATRIUM VOLUME (MOD BIPLANE): 47 ML
LEFT ATRIUM VOLUME INDEX (MOD BIPLANE): 22.5 ML/M2
LEFT INTERNAL DIMENSION IN SYSTOLE: 3.1 CM (ref 2.1–4)
LEFT VENTRICULAR INTERNAL DIMENSION IN DIASTOLE: 4.9 CM (ref 3.5–6)
LEFT VENTRICULAR POSTERIOR WALL IN END DIASTOLE: 1.1 CM
LEFT VENTRICULAR STROKE VOLUME: 78 ML
LVSV (TEICH): 78 ML
MV E'TISSUE VEL-SEP: 8 CM/S
MV PEAK A VEL: 1.07 M/S
MV PEAK E VEL: 86 CM/S
MV STENOSIS PRESSURE HALF TIME: 68 MS
MV VALVE AREA P 1/2 METHOD: 3.24
RA PRESSURE ESTIMATED: 3 MMHG
RIGHT ATRIUM AREA SYSTOLE A4C: 8.8 CM2
RIGHT VENTRICLE ID DIMENSION: 2.5 CM
RV PSP: 38 MMHG
SL CV LEFT ATRIUM LENGTH A2C: 4.7 CM
SL CV LV EF: 55
SL CV PED ECHO LEFT VENTRICLE DIASTOLIC VOLUME (MOD BIPLANE) 2D: 114 ML
SL CV PED ECHO LEFT VENTRICLE SYSTOLIC VOLUME (MOD BIPLANE) 2D: 37 ML
TR MAX PG: 35 MMHG
TR PEAK VELOCITY: 2.9 M/S
TRICUSPID ANNULAR PLANE SYSTOLIC EXCURSION: 2.2 CM
TRICUSPID VALVE PEAK REGURGITATION VELOCITY: 2.94 M/S

## 2024-08-26 PROCEDURE — 99239 HOSP IP/OBS DSCHRG MGMT >30: CPT | Performed by: FAMILY MEDICINE

## 2024-08-26 PROCEDURE — 93306 TTE W/DOPPLER COMPLETE: CPT

## 2024-08-26 PROCEDURE — 93306 TTE W/DOPPLER COMPLETE: CPT | Performed by: INTERNAL MEDICINE

## 2024-08-26 RX ORDER — AMLODIPINE BESYLATE 5 MG/1
5 TABLET ORAL DAILY
Qty: 30 TABLET | Refills: 0 | Status: SHIPPED | OUTPATIENT
Start: 2024-08-27

## 2024-08-26 RX ORDER — ATORVASTATIN CALCIUM 40 MG/1
40 TABLET, FILM COATED ORAL
Qty: 30 TABLET | Refills: 0 | Status: SHIPPED | OUTPATIENT
Start: 2024-08-26

## 2024-08-26 RX ORDER — PREDNISONE 20 MG/1
40 TABLET ORAL 2 TIMES DAILY WITH MEALS
Qty: 24 TABLET | Refills: 0 | Status: SHIPPED | OUTPATIENT
Start: 2024-08-26 | End: 2024-09-01

## 2024-08-26 RX ADMIN — SENNOSIDES 8.6 MG: 8.6 TABLET, FILM COATED ORAL at 08:03

## 2024-08-26 RX ADMIN — DOCUSATE SODIUM 100 MG: 100 CAPSULE, LIQUID FILLED ORAL at 08:03

## 2024-08-26 RX ADMIN — ASPIRIN 81 MG: 81 TABLET, COATED ORAL at 08:03

## 2024-08-26 RX ADMIN — HEPARIN SODIUM 7500 UNITS: 5000 INJECTION INTRAVENOUS; SUBCUTANEOUS at 06:00

## 2024-08-26 RX ADMIN — AMLODIPINE BESYLATE 5 MG: 5 TABLET ORAL at 08:03

## 2024-08-26 RX ADMIN — NICOTINE 1 PATCH: 14 PATCH, EXTENDED RELEASE TRANSDERMAL at 08:03

## 2024-08-26 RX ADMIN — PREDNISONE 40 MG: 20 TABLET ORAL at 08:03

## 2024-08-26 NOTE — PLAN OF CARE
Problem: PAIN - ADULT  Goal: Verbalizes/displays adequate comfort level or baseline comfort level  Description: Interventions:  - Encourage patient to monitor pain and request assistance  - Assess pain using appropriate pain scale  - Administer analgesics based on type and severity of pain and evaluate response  - Implement non-pharmacological measures as appropriate and evaluate response  - Consider cultural and social influences on pain and pain management  - Notify physician/advanced practitioner if interventions unsuccessful or patient reports new pain  Outcome: Progressing     Problem: INFECTION - ADULT  Goal: Absence or prevention of progression during hospitalization  Description: INTERVENTIONS:  - Assess and monitor for signs and symptoms of infection  - Monitor lab/diagnostic results  - Monitor all insertion sites, i.e. indwelling lines, tubes, and drains  - Monitor endotracheal if appropriate and nasal secretions for changes in amount and color  - [place] appropriate cooling/warming therapies per order  - Administer medications as ordered  - Instruct and encourage patient and family to use good hand hygiene technique  - Identify and instruct in appropriate isolation precautions for identified infection/condition  Outcome: Progressing  Goal: Absence of fever/infection during neutropenic period  Description: INTERVENTIONS:  - Monitor WBC    Outcome: Progressing     Problem: SAFETY ADULT  Goal: Patient will remain free of falls  Description: INTERVENTIONS:  - Educate patient/family on patient safety including physical limitations  - Instruct patient to call for assistance with activity   - Consult OT/PT to assist with strengthening/mobility   - Keep Call bell within reach  - Keep bed low and locked with side rails adjusted as appropriate  - Keep care items and personal belongings within reach  - Initiate and maintain comfort rounds  - Make Fall Risk Sign visible to staff  - Offer Toileting every 2 Hours,  in advance of need  - Initiate/Maintain bed alarm  - Obtain necessary fall risk management equipment: fall risk bracelet, yellow socks. Bed alarm  - Apply yellow socks and bracelet for high fall risk patients  - Consider moving patient to room near nurses station  Outcome: Progressing  Goal: Maintain or return to baseline ADL function  Description: INTERVENTIONS:  -  Assess patient's ability to carry out ADLs; assess patient's baseline for ADL function and identify physical deficits which impact ability to perform ADLs (bathing, care of mouth/teeth, toileting, grooming, dressing, etc.)  - Assess/evaluate cause of self-care deficits   - Assess range of motion  - Assess patient's mobility; develop plan if impaired  - Assess patient's need for assistive devices and provide as appropriate  - Encourage maximum independence but intervene and supervise when necessary  - Involve family in performance of ADLs  - Assess for home care needs following discharge   - Consider OT consult to assist with ADL evaluation and planning for discharge  - Provide patient education as appropriate  Outcome: Progressing  Goal: Maintains/Returns to pre admission functional level  Description: INTERVENTIONS:  - Perform AM-PAC 6 Click Basic Mobility/ Daily Activity assessment daily.  - Set and communicate daily mobility goal to care team and patient/family/caregiver.   - Collaborate with rehabilitation services on mobility goals if consulted  - Perform Range of Motion 2 times a day.  - Reposition patient every 2 hours.  - Dangle patient 2 times a day  - Stand patient 2 times a day  - Ambulate patient 2 times a day  - Out of bed to chair 2 times a day   - Out of bed for meals 2 times a day  - Out of bed for toileting  - Record patient progress and toleration of activity level   Outcome: Progressing     Problem: DISCHARGE PLANNING  Goal: Discharge to home or other facility with appropriate resources  Description: INTERVENTIONS:  - Identify  barriers to discharge w/patient and caregiver  - Arrange for needed discharge resources and transportation as appropriate  - Identify discharge learning needs (meds, wound care, etc.)  - Arrange for interpretive services to assist at discharge as needed  - Refer to Case Management Department for coordinating discharge planning if the patient needs post-hospital services based on physician/advanced practitioner order or complex needs related to functional status, cognitive ability, or social support system  Outcome: Progressing

## 2024-08-26 NOTE — ASSESSMENT & PLAN NOTE
One reading above 6.5%  Recheck in couple months as OP  Blood glucose stable this AM  OP follow up with PCP recommended

## 2024-08-26 NOTE — ASSESSMENT & PLAN NOTE
Left facial droop with Concern for bells palsy ,started on prednisone 40mg BID x 7 days w/ OP neurology and PCP followup  As Symptoms were noted within last 24 hour, MRI negative for acute CVA, chronic lacunar CVA noted  Goal normotension- start amlodipine 5mg   Continue aspirin and statin  Lipid panel shows elevated LDL

## 2024-08-26 NOTE — DISCHARGE SUMMARY
[facility]  Discharge- Heidi Pate 1960, 63 y.o. female MRN: [id number]  Unit/Bed#: 2 E 283-01 Encounter: [id number]  Primary Care Provider: Mariella Thomason DO   Date and time admitted to hospital: 8/24/2024  5:18 PM    * Facial droop  Assessment & Plan  Left facial droop with Concern for bells palsy ,started on prednisone 40mg BID x 7 days w/ OP neurology and PCP followup  As Symptoms were noted within last 24 hour, MRI negative for acute CVA, chronic lacunar CVA noted  Goal normotension- start amlodipine 5mg   Continue aspirin and statin  Lipid panel shows elevated LDL    Elevated hemoglobin A1c  Assessment & Plan  One reading above 6.5%  Recheck in couple months as OP  Blood glucose stable this AM  OP follow up with PCP recommended    SIRS (systemic inflammatory response syndrome) (HCC)  Assessment & Plan  Resolved  Normal procal      Hypertensive urgency  Assessment & Plan  Resolved  Present on ED arrival   Not on hypertensive meds at home  Possible new diagnosis   Continue  amlodipine     Morbid obesity (HCC)  Assessment & Plan  Recommend incorporating a more whole foods plant-predominant diet along with decreasing consumption of red meats and processed foods  Per AHA guidelines, recommend moderate-vigorous intensity exercise for 30 minutes a day for 5 days a week or a total of 150 min/week  Counseled on lifestyle changes for >3 min          Medical Problems       Resolved Problems  Date Reviewed: 8/25/2024   None       Discharging Physician / Practitioner: Kofi Cardenas MD  PCP: Mariella Thomason DO  Admission Date:   Admission Orders (From admission, onward)       Ordered        08/25/24 1328  INPATIENT ADMISSION  Once            08/24/24 1836  Place in Observation  Once                          Discharge Date: 08/26/24    Consultations During Hospital Stay:  IP CONSULT TO CASE MANAGEMENT  IP CONSULT TO NUTRITION SERVICES    Procedures Performed:   ECHO:  "    Left Ventricle: Left ventricular cavity size is normal. Wall thickness is normal. The left ventricular ejection fraction is 55%. Systolic function is normal. Wall motion is normal. Diastolic function is normal.    Right Ventricle: Right ventricular cavity size is normal. Systolic function is normal.    Mitral Valve: There is mild to moderate regurgitation.    Tricuspid Valve: There is moderate regurgitation. The right ventricular systolic pressure is mildly elevated. The estimated right ventricular systolic pressure is 38.00 mmHg.    MRI brain: no acute CVA    Significant Findings / Test Results:   As above    Incidental Findings:    chronic lacunar infarctions within the left more than right caudate and right putamen   I reviewed the above mentioned incidental findings with the patient and/or family and they expressed understanding.    Test Results Pending at Discharge (will require follow up):   none     Outpatient Tests Requested:  none    Complications:  none    Reason for Admission: Left facial droop    Hospital Course:   Heidi Pate is a 63 y.o. female patient who originally presented to the hospital on 8/24/2024 due to left facial droop and the differentials were CVA versus Bell's palsy.  MRI was done which did not reveal any acute stroke.  It did show chronic lacunar infarctions within the left more than right caudate and right putamen.  Patient has been started on aspirin, statin and blood pressure medication amlodipine.  Echo was done which was reported to be within normal limits.  Outpatient follow-up with neurology has been recommended she will need carotid studies as outpatient.  Blood pressures are much better controlled now and patient is medically cleared for discharge with close outpatient follow-up      Please see above list of diagnoses and related plan for additional information.     Condition at Discharge: stable    Discharge Day Visit / Exam:   Subjective:  \"I am feeling " "better\"  Vitals: Blood Pressure: 155/87 (08/26/24 1153)  Pulse: 88 (08/26/24 1153)  Temperature: 97.7 °F (36.5 °C) (08/26/24 1153)  Temp Source: Oral (08/26/24 0300)  Respirations: 18 (08/26/24 0300)  Height: 5' 1\" (154.9 cm) (08/26/24 1101)  Weight - Scale: 116 kg (255 lb) (08/26/24 1101)  SpO2: 95 % (08/26/24 1153)  Exam:   Physical Exam   General- Awake, alert and oriented x 3, looks comfortable  HEENT- Normocephalic, atraumatic, oral mucosa- moist  Neck- Supple, No carotid bruit, no JVD  CVS- Normal S1/ S2, Regular rate and rhythm, No murmur, No edema  Respiratory system- B/L clear breath sounds, no wheezing  Abdomen- Soft, Non distended, no tenderness, Bowel sound- present 4 quads  Genitourinary- No suprapubic tenderness, No CVA tenderness  Skin- No new bruise or rash  Musculoskeletal- No gross deformity  Psych- No acute psychosis  CNS-left facial droop has improved compared to yesterday but has not resolved, no other focal neurological deficits noted, no slurred speech but there is some dysarthria from left facial droop    Discussion with Family:  Patient's family has been kept updated at bedside.     Discharge instructions/Information to patient and family:   See after visit summary for information provided to patient and family.      Provisions for Follow-Up Care:  See after visit summary for information related to follow-up care and any pertinent home health orders.      Mobility at time of Discharge:   Basic Mobility Inpatient Raw Score: 23  JH-HLM Goal: 7: Walk 25 feet or more  JH-HLM Achieved: 1: Laying in bed  HLM Goal NOT achieved. Continue to encourage mobility in post discharge setting.     Disposition:   Home with home health    Planned Readmission: no     Discharge Statement:  I spent 70 minutes discharging the patient. This time was spent on the day of discharge. I had direct contact with the patient on the day of discharge. Greater than 50% of the total time was spent examining patient, answering " all patient questions, arranging and discussing plan of care with patient as well as directly providing post-discharge instructions.  Additional time then spent on discharge activities.    Discharge Medications:  See after visit summary for reconciled discharge medications provided to patient and/or family.      **Please Note: This note may have been constructed using a voice recognition system**

## 2024-08-26 NOTE — CASE MANAGEMENT
Case Management Discharge Planning Note    Patient name Heidi Pate  Location 2 [facility] 283/2 E 283-01 MRN [id number]  : 1960 Date 2024       Current Admission Date: 2024  Current Admission Diagnosis:Facial droop   Patient Active Problem List    Diagnosis Date Noted Date Diagnosed    Elevated hemoglobin A1c 2024     Hypertensive urgency 2024     Facial droop 2024     SIRS (systemic inflammatory response syndrome) (HCC) 2024     Iron deficiency anemia secondary to inadequate dietary iron intake      Closed fracture of proximal end of right humerus with routine healing 10/06/2020     Morbid obesity (HCC) 2017     Rotator cuff arthropathy, left 2017       LOS (days): 1  Geometric Mean LOS (GMLOS) (days):   Days to GMLOS:     OBJECTIVE:  Risk of Unplanned Readmission Score: 10.03         Current admission status: Inpatient   Preferred Pharmacy:   [id number] [address] [address]  Phone: [phone] Fax: [phone]    [facility] #[id number] [place], [place] - [address] [place] [address]  Phone: [phone] Fax: [phone]    [id number] [place], [place] - [address] [address]  Phone: [phone] Fax: [phone]    Primary Care Provider: Mariella Thomason DO    Primary Insurance: SETH TAYLOR  Secondary Insurance:     DISCHARGE DETAILS:                                          Other Referral/Resources/Interventions Provided:  Referral Comments: Met w pt and gave her provider choice list printed from [email or web address].  Pt reports she will read and select agency.  Returned one hour later;  pt having echo done.  Will return as soon as possible.         Treatment Team Recommendation: Home  Discharge Destination Plan:: Home with Home Health Care  Transport at  Discharge : Family

## 2024-08-26 NOTE — DISCHARGE INSTR - AVS FIRST PAGE
Follow-up with your PCP within 1 week.  Make an appointment with neurology within 2 to 4 weeks  You have been started on new medications: Aspirin, atorvastatin, amlodipine  Take prednisone as prescribed  Complete smoking cessation advised

## 2024-08-26 NOTE — ASSESSMENT & PLAN NOTE
Resolved  Present on ED arrival   Not on hypertensive meds at home  Possible new diagnosis   Continue  amlodipine

## 2024-08-26 NOTE — UTILIZATION REVIEW
Continued Stay Review    Date: 8/26                          Current Patient Class: Inpatient  Current Level of Care: MS    HPI:63 y.o. female initially admitted on  8/24 w/ L sided facial droop .     Assessment/Plan:     Date: 8/26  Day 3: Has surpassed a 2nd midnight with active treatments and services.  DC to home . MRI neg for acute stroke. Treated w/ asa , statin and amlodipine . Will need carotids as OP . BP better controlled .         Vital Signs (last 3 days)       Date/Time Temp Pulse Resp BP MAP (mmHg) SpO2 O2 Device Patient Position - Orthostatic VS [place] Coma Scale Score Pain    08/26/24 1101 -- 80 -- 134/68 -- -- -- -- -- --    08/26/24 0742 -- -- -- -- -- -- -- -- 15 No Pain    08/26/24 07:23:39 97.8 °F (36.6 °C) 80 -- 134/68 90 91 % -- -- -- --    08/26/24 0700 -- -- -- -- -- -- -- -- 15 --    08/26/24 0300 97.9 °F (36.6 °C) 76 18 138/72 94 94 % None (Room air) Lying 15 --    08/26/24 02:26:16 97.9 °F (36.6 °C) 93 18 138/72 94 96 % -- -- -- --    08/25/24 23:52:31 98.4 °F (36.9 °C) 97 18 115/80 92 96 % -- -- -- --    08/25/24 2300 98.4 °F (36.9 °C) -- 18 115/80 92 -- None (Room air) Lying 15 --    08/25/24 19:36:39 98.7 °F (37.1 °C) 92 -- 183/98 126 97 % -- -- -- --    08/25/24 1931 -- -- -- -- -- -- -- -- 15 --    08/25/24 1928 -- -- -- -- -- -- -- -- -- No Pain    08/25/24 1900 98.7 °F (37.1 °C) -- 18 183/98 92 95 % None (Room air) Lying 15 --    08/25/24 15:23:52 -- -- -- -- -- 95 % -- -- -- --    08/25/24 1500 98.4 °F (36.9 °C) 85 -- 116/91 99 -- -- Lying 15 --    08/25/24 12:58:30 -- 106 -- 181/109 133 94 % -- -- -- --    08/25/24 11:31:18 -- 90 -- 186/101 129 98 % -- -- -- --    08/25/24 1130 98.1 °F (36.7 °C) 86 -- 182/85 117 99 % None (Room air) Lying -- --    08/25/24 1128 -- -- -- -- -- -- -- -- 15 --    08/25/24 1100 -- -- -- -- -- 98 % -- -- -- --    08/25/24 0935 -- -- -- -- -- 96 % None (Room air) -- 15 No Pain    08/25/24 0700 98.1 °F (36.7 °C) 80 -- 147/84 105 -- -- -- 15 --     08/25/24 0630 98.1 °F (36.7 °C) 78 16 152/77 102 95 % -- Lying 15 --    08/25/24 0430 -- 76 16 144/83 103 95 % -- Lying 15 --    08/25/24 0230 -- 77 16 135/80 98 95 % None (Room air) Lying 15 --    08/25/24 0030 97.5 °F (36.4 °C) 81 16 146/79 101 96 % None (Room air) Lying 15 --    08/24/24 23:05:07 -- 81 -- -- -- 92 % -- -- -- --    08/24/24 2230 97.5 °F (36.4 °C) 89 16 131/71 91 96 % None (Room air) Lying 15 --    08/24/24 2130 97.5 °F (36.4 °C) 79 18 129/68 88 92 % None (Room air) Lying 15 --    08/24/24 2030 97.7 °F (36.5 °C) 84 18 146/63 91 96 % None (Room air) Lying 15 --    08/24/24 19:33:24 98.5 °F (36.9 °C) 88 18 146/85 105 97 % None (Room air) Sitting -- --    08/24/24 1930 98.5 °F (36.9 °C) -- 18 146/85 105 -- None (Room air) Sitting 15 No Pain    08/24/24 1857 -- 81 20 195/127 -- 98 % -- Lying -- No Pain    08/24/24 1830 -- -- -- -- -- -- -- -- 15 --    08/24/24 1815 -- 96 20 195/109 -- 98 % -- -- -- --    08/24/24 1719 98.1 °F (36.7 °C) 92 20 187/119 -- 98 % None (Room air) Sitting -- --          Weight (last 2 days)       Date/Time Weight    08/26/24 1101 116 (255)    08/24/24 19:33:24 116 (255)    08/24/24 1719 116 (255)              Pertinent Labs/Diagnostic Results:   Radiology:  MRI brain wo contrast   Final Interpretation by Pallav N Shah, MD (08/25 3377)      No acute infarction.      Mild cerebral and pontine chronic microangiopathic changes.      Few scattered chronic lacunar infarctions within the left more than right caudate and right putamen.      Workstation performed: [id number]         CT head without contrast   Final Interpretation by Edenilson Lerma MD (08/24 6762)      Multiple foci of white matter hypoattenuation, may be the sequela of chronic small vessel ischemic disease but in absence of comparison imaging MRI is recommended if there is concern for acute infarction.         The study was marked in EPIC for immediate notification.                  Workstation performed: [id number]            Cardiology:  8/26 Echo   No orders to display     GI:  No orders to display           Results from last 7 days   Lab Units 08/25/24  0605 08/24/24  1734   WBC Thousand/uL 8.94 10.76*   HEMOGLOBIN g/dL 11.4* 13.0   HEMATOCRIT % 38.7 44.3   PLATELETS Thousands/uL 304 384         Results from last 7 days   Lab Units 08/25/24  0605 08/24/24  1734   SODIUM mmol/L 139 137   POTASSIUM mmol/L 3.9 4.8   CHLORIDE mmol/L 104 103   CO2 mmol/L 29 25   ANION GAP mmol/L 6 9   BUN mg/dL 15 16   CREATININE mg/dL 0.71 0.76   EGFR ml/min/1.73sq m 92 84   CALCIUM mg/dL 9.0 9.4   MAGNESIUM mg/dL 2.1  --          Results from last 7 days   Lab Units 08/24/24  1727   POC GLUCOSE mg/dl 95     Results from last 7 days   Lab Units 08/25/24  0605 08/24/24  1734   GLUCOSE RANDOM mg/dL 108 92         Results from last 7 days   Lab Units 08/24/24  1734   HEMOGLOBIN A1C % 6.7*   EAG mg/dl 146         Results from last 7 days   Lab Units 08/25/24  0605   PROCALCITONIN ng/ml 0.06       Medications:   Scheduled Medications:  amLODIPine, 5 mg, Oral, Daily  aspirin, 81 mg, Oral, Daily  atorvastatin, 40 mg, Oral, Daily With Dinner  docusate sodium, 100 mg, Oral, BID  heparin (porcine), 7,500 Units, Subcutaneous, Q8H EVON  nicotine, 1 patch, Transdermal, Daily  predniSONE, 40 mg, Oral, BID With Meals  senna, 1 tablet, Oral, Daily      Continuous IV Infusions:     PRN Meds:  acetaminophen, 650 mg, Oral, Q6H PRN  calcium carbonate, 1,000 mg, Oral, Daily PRN  ondansetron, 4 mg, Intravenous, Q6H PRN        Discharge Plan: [facility]    Network Utilization Review Department  ATTENTION: Please call with any questions or concerns to [phone] and carefully listen to the prompts so that you are directed to the right person. All voicemails are confidential.   For Discharge needs, contact Care Management DC Support Team at [phone] opt. 2  Send all requests for admission clinical reviews, approved or denied determinations and any other requests to dedicated  fax number below belonging to the campus where the patient is receiving treatment. List of dedicated fax numbers for the Facilities:  FACILITY NAME UR FAX NUMBER   ADMISSION DENIALS (Administrative/Medical Necessity) [phone]   DISCHARGE SUPPORT TEAM (NETWORK) [phone]   PARENT CHILD HEALTH (Maternity/NICU/Pediatrics) [phone]   [facility] [phone]   [facility] [phone]   [facility] [phone]   [facility] [phone]   [facility] [phone]   [facility] [phone]   [facility] [phone]   [facility] [phone]   [facility] [phone]   [facility] [phone]   [facility] [phone]   [facility] [phone]

## 2024-08-27 ENCOUNTER — TRANSITIONAL CARE MANAGEMENT (OUTPATIENT)
Age: 64
End: 2024-08-27

## 2024-08-27 ENCOUNTER — RA CDI HCC (OUTPATIENT)
Dept: OTHER | Facility: HOSPITAL | Age: 64
End: 2024-08-27

## 2024-08-27 ENCOUNTER — TELEPHONE (OUTPATIENT)
Age: 64
End: 2024-08-27

## 2024-08-27 DIAGNOSIS — I16.0 HYPERTENSIVE URGENCY: Primary | ICD-10-CM

## 2024-08-27 DIAGNOSIS — R29.810 FACIAL DROOP: Primary | ICD-10-CM

## 2024-08-27 LAB
ATRIAL RATE: 92 BPM
P AXIS: 59 DEGREES
PR INTERVAL: 122 MS
QRS AXIS: 49 DEGREES
QRSD INTERVAL: 72 MS
QT INTERVAL: 344 MS
QTC INTERVAL: 425 MS
T WAVE AXIS: 88 DEGREES
VENTRICULAR RATE: 92 BPM

## 2024-08-27 PROCEDURE — 93010 ELECTROCARDIOGRAM REPORT: CPT | Performed by: STUDENT IN AN ORGANIZED HEALTH CARE EDUCATION/TRAINING PROGRAM

## 2024-08-27 NOTE — TELEPHONE ENCOUNTER
VNS called in to update provider BP today was 171/101. Hospital needs a referral for Cardiologist and Neurologist to be placed. They also need to rule out Lyme's disease and a A1C and carotid artery study.

## 2024-08-28 NOTE — UTILIZATION REVIEW
NOTIFICATION OF ADMISSION DISCHARGE   This is a Notification of Discharge from [facility]. Please be advised that this patient has been discharge from our facility. Below you will find the admission and discharge date and time including the patient’s disposition.   UTILIZATION REVIEW CONTACT:  Sarah Abdi  Utilization   Network Utilization Review Department  Phone: [phone] x carefully listen to the prompts. All voicemails are confidential.  Email: [email or web address]     ADMISSION INFORMATION  PRESENTATION DATE: 8/24/2024  5:18 PM  OBERVATION ADMISSION DATE: 08/24/2024 1836  INPATIENT ADMISSION DATE: 8/25/24  1:28 PM   DISCHARGE DATE: 8/26/2024  3:40 PM   DISPOSITION:Home with Home Health Care    Network Utilization Review Department  ATTENTION: Please call with any questions or concerns to [phone] and carefully listen to the prompts so that you are directed to the right person. All voicemails are confidential.   For Discharge needs, contact Care Management DC Support Team at [phone] opt. 2  Send all requests for admission clinical reviews, approved or denied determinations and any other requests to dedicated fax number below belonging to the campus where the patient is receiving treatment. List of dedicated fax numbers for the Facilities:  FACILITY NAME UR FAX NUMBER   ADMISSION DENIALS (Administrative/Medical Necessity) [phone]   DISCHARGE SUPPORT TEAM ([facility]) [phone]   PARENT CHILD HEALTH (Maternity/NICU/Pediatrics) [phone]   [facility] [phone]   [facility] [phone]   [facility] [phone]   [facility] [phone]   [facility] [phone]   [facility] [phone]   [facility] [phone]   [facility]  [facility] [phone]   [facility] [phone]   [facility] [phone]   [facility] [phone]   [facility] [phone]

## 2024-09-05 ENCOUNTER — OFFICE VISIT (OUTPATIENT)
Dept: FAMILY MEDICINE CLINIC | Facility: CLINIC | Age: 64
End: 2024-09-05
Payer: COMMERCIAL

## 2024-09-05 VITALS
SYSTOLIC BLOOD PRESSURE: 140 MMHG | WEIGHT: 253.8 LBS | HEIGHT: 61 IN | HEART RATE: 98 BPM | OXYGEN SATURATION: 98 % | DIASTOLIC BLOOD PRESSURE: 80 MMHG | BODY MASS INDEX: 47.92 KG/M2

## 2024-09-05 DIAGNOSIS — I10 UNCONTROLLED HYPERTENSION: ICD-10-CM

## 2024-09-05 DIAGNOSIS — Z12.4 SCREENING FOR CERVICAL CANCER: ICD-10-CM

## 2024-09-05 DIAGNOSIS — Z12.31 SCREENING MAMMOGRAM FOR BREAST CANCER: ICD-10-CM

## 2024-09-05 DIAGNOSIS — E78.2 MIXED HYPERLIPIDEMIA: ICD-10-CM

## 2024-09-05 DIAGNOSIS — R73.09 ELEVATED HEMOGLOBIN A1C: ICD-10-CM

## 2024-09-05 DIAGNOSIS — D53.9 NUTRITIONAL ANEMIA, UNSPECIFIED: ICD-10-CM

## 2024-09-05 DIAGNOSIS — I10 PRIMARY HYPERTENSION: ICD-10-CM

## 2024-09-05 DIAGNOSIS — Z76.89 ENCOUNTER FOR SUPPORT AND COORDINATION OF TRANSITION OF CARE: ICD-10-CM

## 2024-09-05 DIAGNOSIS — Z76.89 ESTABLISHING CARE WITH NEW DOCTOR, ENCOUNTER FOR: Primary | ICD-10-CM

## 2024-09-05 DIAGNOSIS — Z12.11 SCREENING FOR COLON CANCER: ICD-10-CM

## 2024-09-05 DIAGNOSIS — Z86.73 ARTERIAL ISCHEMIC STROKE, CHRONIC: ICD-10-CM

## 2024-09-05 DIAGNOSIS — E55.9 VITAMIN D DEFICIENCY: ICD-10-CM

## 2024-09-05 DIAGNOSIS — D50.8 IRON DEFICIENCY ANEMIA SECONDARY TO INADEQUATE DIETARY IRON INTAKE: ICD-10-CM

## 2024-09-05 DIAGNOSIS — R29.810 FACIAL DROOP: ICD-10-CM

## 2024-09-05 PROCEDURE — 3725F SCREEN DEPRESSION PERFORMED: CPT | Performed by: NURSE PRACTITIONER

## 2024-09-05 PROCEDURE — 99205 OFFICE O/P NEW HI 60 MIN: CPT | Performed by: NURSE PRACTITIONER

## 2024-09-05 PROCEDURE — 1111F DSCHRG MED/CURRENT MED MERGE: CPT | Performed by: NURSE PRACTITIONER

## 2024-09-05 RX ORDER — FERROUS SULFATE 324(65)MG
324 TABLET, DELAYED RELEASE (ENTERIC COATED) ORAL
Qty: 90 TABLET | Refills: 1 | Status: SHIPPED | OUTPATIENT
Start: 2024-09-05

## 2024-09-05 RX ORDER — MULTIVIT WITH MINERALS/LUTEIN
250 TABLET ORAL DAILY
Qty: 90 TABLET | Refills: 1 | Status: SHIPPED | OUTPATIENT
Start: 2024-09-05 | End: 2024-09-05

## 2024-09-05 RX ORDER — AMLODIPINE BESYLATE 5 MG/1
5 TABLET ORAL DAILY
Qty: 90 TABLET | Refills: 1 | Status: SHIPPED | OUTPATIENT
Start: 2024-09-05

## 2024-09-05 RX ORDER — MULTIVIT WITH MINERALS/LUTEIN
250 TABLET ORAL DAILY
Qty: 90 TABLET | Refills: 1 | Status: SHIPPED | OUTPATIENT
Start: 2024-09-05

## 2024-09-05 RX ORDER — FERROUS SULFATE 324(65)MG
324 TABLET, DELAYED RELEASE (ENTERIC COATED) ORAL
Qty: 90 TABLET | Refills: 1 | Status: SHIPPED | OUTPATIENT
Start: 2024-09-05 | End: 2024-09-05

## 2024-09-05 RX ORDER — ATORVASTATIN CALCIUM 40 MG/1
40 TABLET, FILM COATED ORAL
Qty: 90 TABLET | Refills: 1 | Status: SHIPPED | OUTPATIENT
Start: 2024-09-05

## 2024-09-05 NOTE — ASSESSMENT & PLAN NOTE
Noted on exam today.  Per patient, facial droop has improved since hospitalization.  Completed course of prednisone.  Has an appointment for carotid duplex study on 10/29.  Appointment with neurology on 1/23/25.

## 2024-09-05 NOTE — ASSESSMENT & PLAN NOTE
Blood pressure is controlled in office today.  Was recently started on amlodipine during hospitalization.  To continue on current medications prescribed.  Encouraged heart healthy diet, weight management, limiting sodium intake to 1400 mg/day.  To repeat blood work in 3 months

## 2024-09-05 NOTE — PROGRESS NOTES
Ambulatory Visit  Name: Heidi Pate      : 1960      MRN: [id number]  Encounter Provider: FRANCESCA Morrow  Encounter Date: 2024   Encounter department: [facility] [address]    Assessment & Plan   1. Establishing care with new doctor, encounter for  2. Facial droop  Assessment & Plan:  Noted on exam today.  Per patient, facial droop has improved since hospitalization.  Completed course of prednisone.  Has an appointment for carotid duplex study on 10/29.  Appointment with neurology on 25.  3. Arterial ischemic stroke, chronic  Assessment & Plan:  Currently on amlodipine, aspirin, atorvastatin.  Has appointment with neurology on 2025.  To continue current medications prescribed.  Discussed importance of blood pressure control.  Orders:  -     atorvastatin (LIPITOR) 40 mg tablet; Take 1 tablet (40 mg total) by mouth daily with dinner  -     CBC and differential; Future; Expected date: 2024  -     Comprehensive metabolic panel; Future; Expected date: 2024  -     Hemoglobin A1C; Future; Expected date: 2024  -     Lipid Panel with Direct LDL reflex; Future; Expected date: 2024  4. Uncontrolled hypertension  Assessment & Plan:  Blood pressure is controlled in office today.  Was recently started on amlodipine during hospitalization.  To continue on current medications prescribed.  Encouraged heart healthy diet, weight management, limiting sodium intake to 1400 mg/day.  To repeat blood work in 3 months  Orders:  -     amLODIPine (NORVASC) 5 mg tablet; Take 1 tablet (5 mg total) by mouth daily  5. Iron deficiency anemia secondary to inadequate dietary iron intake  Assessment & Plan:  Recent CBCs reviewed.  Patient has a long history of iron deficiency anemia.  Did previously see hematology, receives iron infusions,, levels do not improve much.  Will restart patient on iron supplement and vitamin C.  To repeat blood work in 3  months.  Orders:  -     ascorbic acid (VITAMIN C) 250 mg tablet; Take 1 tablet (250 mg total) by mouth daily  -     ferrous sulfate 324 (65 Fe) mg; Take 1 tablet (324 mg total) by mouth daily before breakfast  -     CBC and differential; Future; Expected date: 12/05/2024  -     Iron Panel (Includes Ferritin, Iron Sat%, Iron, and TIBC); Future; Expected date: 12/05/2024  -     Folate; Future; Expected date: 12/05/2024  6. Elevated hemoglobin A1c  Assessment & Plan:  Patient denies symptoms related to elevated blood sugars.  Does note lack of activity and frequent snacking.  Discussed importance of weight management, physical activity such as walking.  Discussed making dietary changes including reduction in sugars, carbonated drinks.  Discussed consuming low carbohydrate diet.  Will repeat blood work in 3 months.  Orders:  -     Comprehensive metabolic panel; Future; Expected date: 12/05/2024  -     Hemoglobin A1C; Future; Expected date: 12/05/2024  -     TSH, 3rd generation with Free T4 reflex; Future; Expected date: 12/05/2024  7. Mixed hyperlipidemia  -     Comprehensive metabolic panel; Future; Expected date: 12/05/2024  -     Hemoglobin A1C; Future; Expected date: 12/05/2024  -     Lipid Panel with Direct LDL reflex; Future; Expected date: 12/05/2024  -     TSH, 3rd generation with Free T4 reflex; Future; Expected date: 12/05/2024  8. Vitamin D deficiency  -     Vitamin D 25 hydroxy; Future; Expected date: 12/05/2024  9. Screening for cervical cancer  -     Ambulatory Referral to Obstetrics / Gynecology; Future  10. Screening mammogram for breast cancer  -     Mammo screening bilateral w 3d and cad; Future; Expected date: 09/05/2024  11. Screening for colon cancer  -     Ambulatory Referral to Gastroenterology; Future  12. Nutritional anemia, unspecified  -     Folate; Future; Expected date: 12/05/2024  13. Encounter for support and coordination of transition of care  14. Primary hypertension  Assessment &  Plan:  Blood pressure is controlled in office today.  Was recently started on amlodipine during hospitalization.  To continue on current medications prescribed.  Encouraged heart healthy diet, weight management, limiting sodium intake to 1400 mg/day.  To repeat blood work in 3 months      Depression Screening and Follow-up Plan: Patient was screened for depression during today's encounter. They screened negative with a PHQ-2 score of 0.      History of Present Illness     Presents office accompanied by daughter for establishment of care.  Recently seen at [facility].  Patient with past medical history of iron deficiency anemia, bilateral shoulder surgery.  Has had iron transfusions in the past with hematology.    Patient recently hospitalized at [facility] 8/24 - 8/26.  Went to the hospital with left-sided facial droop.  CVA vs. Bell's Palsy  Imaging of the brain did not show any acute findings, found old infarctions.  Patient was noted to be hyper tensive.  Initiated on amlodipine, aspirin, atorvastatin.  Given Prednisone upon discharge.  Patient notes improvement of left-sided facial droop since treatment.  Patient denies visual disturbances, headaches, dizziness, unilateral weakness, chest pain, shortness of breath.        Review of Systems   Constitutional:  Negative for activity change, appetite change and fatigue.   HENT:  Negative for sore throat and trouble swallowing.    Eyes:  Negative for photophobia and visual disturbance.   Respiratory:  Negative for cough, chest tightness and shortness of breath.    Gastrointestinal:  Negative for abdominal pain and blood in stool.   Endocrine: Negative for polydipsia, polyphagia and polyuria.   Genitourinary:  Negative for hematuria and vaginal bleeding.   Musculoskeletal:  Negative for arthralgias and myalgias.   Skin:  Negative for color change and rash.   Neurological:  Positive for facial asymmetry. Negative for dizziness, seizures,  syncope, speech difficulty, weakness, light-headedness and headaches.   Hematological:  Negative for adenopathy.   Psychiatric/Behavioral:  Negative for confusion.      Pertinent Medical History       Medical History Reviewed by provider this encounter:  Tobacco  Allergies  Meds  Problems  Med Hx  Surg Hx  Fam Hx  Soc   Hx      Past Medical History   Past Medical History:   Diagnosis Date    Shoulder fracture      Past Surgical History:   Procedure Laterality Date    CHOLECYSTECTOMY      FOREIGN BODY REMOVAL Left     foot    MT ARTHROPLASTY GLENOHUMERAL JOINT TOTAL SHOULDER Right 3/3/2021    Procedure: ARTHROPLASTY SHOULDER REVERSE;  Surgeon: Michael Caro DO;  Location: [facility];  Service: Orthopedics    SHOULDER SURGERY Left      Family History   Problem Relation Age of Onset    No Known Problems Mother     Lymphoma Father     Breast cancer Sister     Ovarian cancer Sister     No Known Problems Brother     No Known Problems Son     No Known Problems Daughter     No Known Problems Maternal Grandmother     No Known Problems Maternal Grandfather     No Known Problems Paternal Grandmother     No Known Problems Paternal Grandfather     No Known Problems Maternal Aunt     No Known Problems Maternal Uncle     No Known Problems Paternal Aunt     No Known Problems Paternal Uncle     No Known Problems Cousin      Current Outpatient Medications on File Prior to Visit   Medication Sig Dispense Refill    acetaminophen (TYLENOL) 325 mg tablet Take 975 mg by mouth every 6 (six) hours as needed for mild pain (last dose 8pm last night)      aspirin (ECOTRIN LOW STRENGTH) 81 mg EC tablet Take 1 tablet (81 mg total) by mouth daily 30 tablet 0    [DISCONTINUED] amLODIPine (NORVASC) 5 mg tablet Take 1 tablet (5 mg total) by mouth daily 30 tablet 0    [DISCONTINUED] atorvastatin (LIPITOR) 40 mg tablet Take 1 tablet (40 mg total) by mouth daily with dinner 30 tablet 0    [DISCONTINUED] ferrous gluconate (FERGON) 240 (27 FE)  MG tablet Take 1 tablet (240 mg total) by mouth daily (Patient not taking: Reported on 5/17/2021) 30 tablet 1    [DISCONTINUED] oxyCODONE-acetaminophen (PERCOCET) 5-325 mg per tablet Take 1 tablet by mouth every 4 (four) hours as needed for moderate pain or severe painMax Daily Amount: 6 tablets (Patient not taking: Reported on 4/14/2021) 20 tablet 0    [DISCONTINUED] VITAMIN D, CHOLECALCIFEROL, PO Take 1 tablet by mouth once a week (Patient not taking: Reported on 8/20/2024)       No current facility-administered medications on file prior to visit.   No Known Allergies   Current Outpatient Medications on File Prior to Visit   Medication Sig Dispense Refill    acetaminophen (TYLENOL) 325 mg tablet Take 975 mg by mouth every 6 (six) hours as needed for mild pain (last dose 8pm last night)      aspirin (ECOTRIN LOW STRENGTH) 81 mg EC tablet Take 1 tablet (81 mg total) by mouth daily 30 tablet 0    [DISCONTINUED] amLODIPine (NORVASC) 5 mg tablet Take 1 tablet (5 mg total) by mouth daily 30 tablet 0    [DISCONTINUED] atorvastatin (LIPITOR) 40 mg tablet Take 1 tablet (40 mg total) by mouth daily with dinner 30 tablet 0    [DISCONTINUED] ferrous gluconate (FERGON) 240 (27 FE) MG tablet Take 1 tablet (240 mg total) by mouth daily (Patient not taking: Reported on 5/17/2021) 30 tablet 1    [DISCONTINUED] oxyCODONE-acetaminophen (PERCOCET) 5-325 mg per tablet Take 1 tablet by mouth every 4 (four) hours as needed for moderate pain or severe painMax Daily Amount: 6 tablets (Patient not taking: Reported on 4/14/2021) 20 tablet 0    [DISCONTINUED] VITAMIN D, CHOLECALCIFEROL, PO Take 1 tablet by mouth once a week (Patient not taking: Reported on 8/20/2024)       No current facility-administered medications on file prior to visit.      Social History     Tobacco Use    Smoking status: Every Day     Current packs/day: 0.50     Average packs/day: 0.5 packs/day for 46.7 years (23.3 ttl pk-yrs)     Types: Cigarettes     Start date:  "1978    Smokeless tobacco: Never   Vaping Use    Vaping status: Never Used   Substance and Sexual Activity    Alcohol use: Never    Drug use: No    Sexual activity: Yes     Objective     /80 (BP Location: Right arm, Patient Position: Sitting, Cuff Size: Large)   Pulse 98   Ht 5' 1\" (1.549 m)   Wt 115 kg (253 lb 12.8 oz)   SpO2 98%   BMI 47.96 kg/m²     Physical Exam  Vitals reviewed.   Constitutional:       General: She is not in acute distress.     Appearance: She is obese. She is not ill-appearing.   HENT:      Head: Normocephalic and atraumatic.      Right Ear: External ear normal.      Left Ear: External ear normal.      Nose: Nose normal.      Mouth/Throat:      Mouth: Mucous membranes are moist.      Pharynx: Oropharynx is clear.   Eyes:      Extraocular Movements: Extraocular movements intact.      Conjunctiva/sclera: Conjunctivae normal.      Pupils: Pupils are equal, round, and reactive to light.   Cardiovascular:      Rate and Rhythm: Normal rate and regular rhythm.      Pulses: Normal pulses.      Heart sounds: Normal heart sounds.   Pulmonary:      Effort: Pulmonary effort is normal.      Breath sounds: Normal breath sounds.   Musculoskeletal:         General: Normal range of motion.      Cervical back: Normal range of motion and neck supple.   Skin:     General: Skin is warm and dry.   Neurological:      General: No focal deficit present.      Mental Status: She is alert and oriented to person, place, and time.      GCS: GCS eye subscore is 4. GCS verbal subscore is 5. GCS motor subscore is 6.      Cranial Nerves: Facial asymmetry (left sided facial droop) present.      Motor: Motor function is intact.      Coordination: Coordination is intact.      Gait: Gait is intact.   Psychiatric:         Mood and Affect: Mood normal.         Behavior: Behavior normal.       Administrative Statements   I have spent a total time of 50 minutes in caring for this patient on the day of the visit/encounter " including Diagnostic results, Prognosis, Risks and benefits of tx options, Instructions for management, Patient and family education, Importance of tx compliance, Risk factor reductions, Impressions, Documenting in the medical record, and Obtaining or reviewing history  .

## 2024-09-05 NOTE — ASSESSMENT & PLAN NOTE
Currently on amlodipine, aspirin, atorvastatin.  Has appointment with neurology on 1/23/2025.  To continue current medications prescribed.  Discussed importance of blood pressure control.

## 2024-09-05 NOTE — ASSESSMENT & PLAN NOTE
Recent CBCs reviewed.  Patient has a long history of iron deficiency anemia.  Did previously see hematology, receives iron infusions,, levels do not improve much.  Will restart patient on iron supplement and vitamin C.  To repeat blood work in 3 months.

## 2024-09-05 NOTE — ASSESSMENT & PLAN NOTE
Patient denies symptoms related to elevated blood sugars.  Does note lack of activity and frequent snacking.  Discussed importance of weight management, physical activity such as walking.  Discussed making dietary changes including reduction in sugars, carbonated drinks.  Discussed consuming low carbohydrate diet.  Will repeat blood work in 3 months.

## 2024-10-29 ENCOUNTER — HOSPITAL ENCOUNTER (OUTPATIENT)
Dept: NON INVASIVE DIAGNOSTICS | Facility: CLINIC | Age: 64
Discharge: HOME | End: 2024-10-29
Payer: COMMERCIAL

## 2024-10-29 DIAGNOSIS — R29.810 FACIAL DROOP: ICD-10-CM

## 2024-10-29 DIAGNOSIS — I16.0 HYPERTENSIVE URGENCY: ICD-10-CM

## 2024-10-29 PROCEDURE — 93880 EXTRACRANIAL BILAT STUDY: CPT | Performed by: SURGERY

## 2024-10-29 PROCEDURE — 93880 EXTRACRANIAL BILAT STUDY: CPT

## 2024-11-19 ENCOUNTER — APPOINTMENT (OUTPATIENT)
Age: 64
End: 2024-11-19
Payer: COMMERCIAL

## 2024-11-19 DIAGNOSIS — Z86.73 ARTERIAL ISCHEMIC STROKE, CHRONIC: ICD-10-CM

## 2024-11-19 DIAGNOSIS — E55.9 VITAMIN D DEFICIENCY: ICD-10-CM

## 2024-11-19 DIAGNOSIS — R73.09 ELEVATED HEMOGLOBIN A1C: ICD-10-CM

## 2024-11-19 DIAGNOSIS — E78.2 MIXED HYPERLIPIDEMIA: ICD-10-CM

## 2024-11-19 DIAGNOSIS — D50.8 IRON DEFICIENCY ANEMIA SECONDARY TO INADEQUATE DIETARY IRON INTAKE: ICD-10-CM

## 2024-11-19 DIAGNOSIS — D53.9 NUTRITIONAL ANEMIA, UNSPECIFIED: ICD-10-CM

## 2024-11-19 LAB
25(OH)D3 SERPL-MCNC: 26.5 NG/ML (ref 30–100)
ALBUMIN SERPL BCG-MCNC: 3.9 G/DL (ref 3.5–5)
ALP SERPL-CCNC: 129 U/L (ref 34–104)
ALT SERPL W P-5'-P-CCNC: 10 U/L (ref 7–52)
ANION GAP SERPL CALCULATED.3IONS-SCNC: 13 MMOL/L (ref 4–13)
AST SERPL W P-5'-P-CCNC: 15 U/L (ref 13–39)
BASOPHILS # BLD AUTO: 0.04 THOUSANDS/ÂΜL (ref 0–0.1)
BASOPHILS NFR BLD AUTO: 0 % (ref 0–1)
BILIRUB SERPL-MCNC: 0.56 MG/DL (ref 0.2–1)
BUN SERPL-MCNC: 11 MG/DL (ref 5–25)
CALCIUM SERPL-MCNC: 9.1 MG/DL (ref 8.4–10.2)
CHLORIDE SERPL-SCNC: 101 MMOL/L (ref 96–108)
CHOLEST SERPL-MCNC: 149 MG/DL (ref ?–200)
CO2 SERPL-SCNC: 25 MMOL/L (ref 21–32)
CREAT SERPL-MCNC: 0.64 MG/DL (ref 0.6–1.3)
EOSINOPHIL # BLD AUTO: 0.12 THOUSAND/ÂΜL (ref 0–0.61)
EOSINOPHIL NFR BLD AUTO: 1 % (ref 0–6)
ERYTHROCYTE [DISTWIDTH] IN BLOOD BY AUTOMATED COUNT: 18.7 % (ref 11.6–15.1)
EST. AVERAGE GLUCOSE BLD GHB EST-MCNC: 160 MG/DL
FERRITIN SERPL-MCNC: 94 NG/ML (ref 11–307)
FOLATE SERPL-MCNC: 14.2 NG/ML
GFR SERPL CREATININE-BSD FRML MDRD: 96 ML/MIN/1.73SQ M
GLUCOSE P FAST SERPL-MCNC: 145 MG/DL (ref 65–99)
HBA1C MFR BLD: 7.2 %
HCT VFR BLD AUTO: 42.4 % (ref 34.8–46.1)
HDLC SERPL-MCNC: 52 MG/DL
HGB BLD-MCNC: 12.5 G/DL (ref 11.5–15.4)
IMM GRANULOCYTES # BLD AUTO: 0.04 THOUSAND/UL (ref 0–0.2)
IMM GRANULOCYTES NFR BLD AUTO: 0 % (ref 0–2)
IRON SATN MFR SERPL: 11 % (ref 15–50)
IRON SERPL-MCNC: 34 UG/DL (ref 50–212)
LDLC SERPL CALC-MCNC: 68 MG/DL (ref 0–100)
LYMPHOCYTES # BLD AUTO: 2.57 THOUSANDS/ÂΜL (ref 0.6–4.47)
LYMPHOCYTES NFR BLD AUTO: 23 % (ref 14–44)
MCH RBC QN AUTO: 23.8 PG (ref 26.8–34.3)
MCHC RBC AUTO-ENTMCNC: 29.5 G/DL (ref 31.4–37.4)
MCV RBC AUTO: 81 FL (ref 82–98)
MONOCYTES # BLD AUTO: 0.51 THOUSAND/ÂΜL (ref 0.17–1.22)
MONOCYTES NFR BLD AUTO: 5 % (ref 4–12)
NEUTROPHILS # BLD AUTO: 7.91 THOUSANDS/ÂΜL (ref 1.85–7.62)
NEUTS SEG NFR BLD AUTO: 71 % (ref 43–75)
NRBC BLD AUTO-RTO: 0 /100 WBCS
PLATELET # BLD AUTO: 401 THOUSANDS/UL (ref 149–390)
PMV BLD AUTO: 10 FL (ref 8.9–12.7)
POTASSIUM SERPL-SCNC: 4.2 MMOL/L (ref 3.5–5.3)
PROT SERPL-MCNC: 7.5 G/DL (ref 6.4–8.4)
RBC # BLD AUTO: 5.25 MILLION/UL (ref 3.81–5.12)
SODIUM SERPL-SCNC: 139 MMOL/L (ref 135–147)
TIBC SERPL-MCNC: 318 UG/DL (ref 250–450)
TRIGL SERPL-MCNC: 146 MG/DL (ref ?–150)
TSH SERPL DL<=0.05 MIU/L-ACNC: 0.93 UIU/ML (ref 0.45–4.5)
UIBC SERPL-MCNC: 284 UG/DL (ref 155–355)
WBC # BLD AUTO: 11.19 THOUSAND/UL (ref 4.31–10.16)

## 2024-11-19 PROCEDURE — 82728 ASSAY OF FERRITIN: CPT

## 2024-11-19 PROCEDURE — 36415 COLL VENOUS BLD VENIPUNCTURE: CPT

## 2024-11-19 PROCEDURE — 83540 ASSAY OF IRON: CPT

## 2024-11-19 PROCEDURE — 83036 HEMOGLOBIN GLYCOSYLATED A1C: CPT

## 2024-11-19 PROCEDURE — 80061 LIPID PANEL: CPT

## 2024-11-19 PROCEDURE — 84443 ASSAY THYROID STIM HORMONE: CPT

## 2024-11-19 PROCEDURE — 82306 VITAMIN D 25 HYDROXY: CPT

## 2024-11-19 PROCEDURE — 82746 ASSAY OF FOLIC ACID SERUM: CPT

## 2024-11-19 PROCEDURE — 80053 COMPREHEN METABOLIC PANEL: CPT

## 2024-11-19 PROCEDURE — 85025 COMPLETE CBC W/AUTO DIFF WBC: CPT

## 2024-11-19 PROCEDURE — 83550 IRON BINDING TEST: CPT

## 2024-11-20 ENCOUNTER — RESULTS FOLLOW-UP (OUTPATIENT)
Dept: FAMILY MEDICINE CLINIC | Facility: CLINIC | Age: 64
End: 2024-11-20

## 2024-12-05 ENCOUNTER — RESULTS FOLLOW-UP (OUTPATIENT)
Dept: FAMILY MEDICINE CLINIC | Facility: CLINIC | Age: 64
End: 2024-12-05

## 2024-12-05 ENCOUNTER — OFFICE VISIT (OUTPATIENT)
Dept: FAMILY MEDICINE CLINIC | Facility: CLINIC | Age: 64
End: 2024-12-05
Payer: COMMERCIAL

## 2024-12-05 ENCOUNTER — TELEPHONE (OUTPATIENT)
Age: 64
End: 2024-12-05

## 2024-12-05 VITALS
OXYGEN SATURATION: 98 % | DIASTOLIC BLOOD PRESSURE: 86 MMHG | SYSTOLIC BLOOD PRESSURE: 142 MMHG | TEMPERATURE: 97.6 F | HEART RATE: 102 BPM | BODY MASS INDEX: 49.05 KG/M2 | WEIGHT: 259.8 LBS | HEIGHT: 61 IN

## 2024-12-05 DIAGNOSIS — I10 PRIMARY HYPERTENSION: ICD-10-CM

## 2024-12-05 DIAGNOSIS — Z00.00 ENCOUNTER FOR ANNUAL WELLNESS EXAM IN MEDICARE PATIENT: Primary | ICD-10-CM

## 2024-12-05 DIAGNOSIS — E11.9 TYPE 2 DIABETES MELLITUS WITHOUT COMPLICATION, WITHOUT LONG-TERM CURRENT USE OF INSULIN (HCC): ICD-10-CM

## 2024-12-05 DIAGNOSIS — R60.0 LEG EDEMA, LEFT: ICD-10-CM

## 2024-12-05 DIAGNOSIS — E55.9 VITAMIN D INSUFFICIENCY: ICD-10-CM

## 2024-12-05 DIAGNOSIS — D50.8 IRON DEFICIENCY ANEMIA SECONDARY TO INADEQUATE DIETARY IRON INTAKE: ICD-10-CM

## 2024-12-05 DIAGNOSIS — Z12.31 SCREENING MAMMOGRAM FOR BREAST CANCER: ICD-10-CM

## 2024-12-05 LAB
LEFT EYE DIABETIC RETINOPATHY: ABNORMAL
LEFT EYE IMAGE QUALITY: ABNORMAL
LEFT EYE MACULAR EDEMA: ABNORMAL
LEFT EYE OTHER RETINOPATHY: ABNORMAL
RIGHT EYE DIABETIC RETINOPATHY: ABNORMAL
RIGHT EYE IMAGE QUALITY: ABNORMAL
RIGHT EYE MACULAR EDEMA: ABNORMAL
RIGHT EYE OTHER RETINOPATHY: ABNORMAL
SEVERITY (EYE EXAM): ABNORMAL

## 2024-12-05 PROCEDURE — G0438 PPPS, INITIAL VISIT: HCPCS | Performed by: NURSE PRACTITIONER

## 2024-12-05 PROCEDURE — 99214 OFFICE O/P EST MOD 30 MIN: CPT | Performed by: NURSE PRACTITIONER

## 2024-12-05 RX ORDER — ACETAMINOPHEN 160 MG
2000 TABLET,DISINTEGRATING ORAL DAILY
Qty: 90 CAPSULE | Refills: 1 | Status: SHIPPED | OUTPATIENT
Start: 2024-12-05

## 2024-12-05 RX ORDER — BLOOD SUGAR DIAGNOSTIC
STRIP MISCELLANEOUS
Qty: 100 EACH | Refills: 3 | Status: SHIPPED | OUTPATIENT
Start: 2024-12-05

## 2024-12-05 RX ORDER — METFORMIN HYDROCHLORIDE 500 MG/1
500 TABLET, EXTENDED RELEASE ORAL
Qty: 90 TABLET | Refills: 1 | Status: SHIPPED | OUTPATIENT
Start: 2024-12-05

## 2024-12-05 RX ORDER — BLOOD-GLUCOSE METER
KIT MISCELLANEOUS
Qty: 1 KIT | Refills: 0 | Status: SHIPPED | OUTPATIENT
Start: 2024-12-05

## 2024-12-05 RX ORDER — LANCETS 33 GAUGE
EACH MISCELLANEOUS
Qty: 100 EACH | Refills: 3 | Status: SHIPPED | OUTPATIENT
Start: 2024-12-05

## 2024-12-05 NOTE — ASSESSMENT & PLAN NOTE
Recent CBC reviewed with patient.  Patient has been on daily iron supplements.  Advised to take vitamin C along with iron for better absorption.  Patient does not wish to be referred to hematology at this time.  To continue on iron and vitamin C as prescribed.

## 2024-12-05 NOTE — TELEPHONE ENCOUNTER
Kaylee from [facility] calling to request we call pt to sched colonoscopy or consult. Per last note on referral pt had this sched but does not seem like she has had one as yet. Pt advised office we did not reach out to her about sched. Please call pt to help sched.

## 2024-12-05 NOTE — PATIENT INSTRUCTIONS
Medicare Preventive Visit Patient Instructions  Thank you for completing your Welcome to Medicare Visit or Medicare Annual Wellness Visit today. Your next wellness visit will be due in one year (12/6/2025).  The screening/preventive services that you may require over the next 5-10 years are detailed below. Some tests may not apply to you based off risk factors and/or age. Screening tests ordered at today's visit but not completed yet may show as past due. Also, please note that scanned in results may not display below.  Preventive Screenings:  Service Recommendations Previous Testing/Comments   Colorectal Cancer Screening  * Colonoscopy    * Fecal Occult Blood Test (FOBT)/Fecal Immunochemical Test (FIT)  * Fecal DNA/Cologuard Test  * Flexible Sigmoidoscopy Age: 45-75 years old   Colonoscopy: every 10 years (may be performed more frequently if at higher risk)  OR  FOBT/FIT: every 1 year  OR  Cologuard: every 3 years  OR  Sigmoidoscopy: every 5 years  Screening may be recommended earlier than age 45 if at higher risk for colorectal cancer. Also, an individualized decision between you and your healthcare provider will decide whether screening between the ages of 76-85 would be appropriate. Colonoscopy: Not on file  FOBT/FIT: Not on file  Cologuard: Not on file  Sigmoidoscopy: Not on file    Due for Colonoscopy - High Risk     Breast Cancer Screening Age: 40+ years old  Frequency: every 1-2 years  Not required if history of left and right mastectomy Mammogram: Not on file    Due for Mammogram   Cervical Cancer Screening Between the ages of 21-29, pap smear recommended once every 3 years.   Between the ages of 30-65, can perform pap smear with HPV co-testing every 5 years.   Recommendations may differ for women with a history of total hysterectomy, cervical cancer, or abnormal pap smears in past. Pap Smear: Not on file    Due for Cervical Pap Smear   Hepatitis C Screening Once for adults born between 1945 and 1965  More  frequently in patients at high risk for Hepatitis C Hep C Antibody: Not on file    Patient Declines   Diabetes Screening 1-2 times per year if you're at risk for diabetes or have pre-diabetes Fasting glucose: 145 mg/dL (11/19/2024)  A1C: 7.2 % (11/19/2024)  Screening Current   Cholesterol Screening Once every 5 years if you don't have a lipid disorder. May order more often based on risk factors. Lipid panel: 11/19/2024    Screening Not Indicated  History Lipid Disorder     Other Preventive Screenings Covered by Medicare:  Abdominal Aortic Aneurysm (AAA) Screening: covered once if your at risk. You're considered to be at risk if you have a family history of AAA.  Lung Cancer Screening: covers low dose CT scan once per year if you meet all of the following conditions: (1) Age 55-77; (2) No signs or symptoms of lung cancer; (3) Current smoker or have quit smoking within the last 15 years; (4) You have a tobacco smoking history of at least 20 pack years (packs per day multiplied by number of years you smoked); (5) You get a written order from a healthcare provider.  Glaucoma Screening: covered annually if you're considered high risk: (1) You have diabetes OR (2) Family history of glaucoma OR (3)  aged 50 and older OR (4)  American aged 65 and older  Osteoporosis Screening: covered every 2 years if you meet one of the following conditions: (1) You're estrogen deficient and at risk for osteoporosis based off medical history and other findings; (2) Have a vertebral abnormality; (3) On glucocorticoid therapy for more than 3 months; (4) Have primary hyperparathyroidism; (5) On osteoporosis medications and need to assess response to drug therapy.   Last bone density test (DXA Scan): Not on file.  HIV Screening: covered annually if you're between the age of 15-65. Also covered annually if you are younger than 15 and older than 65 with risk factors for HIV infection. For pregnant patients, it is covered  up to 3 times per pregnancy.    Immunizations:  Immunization Recommendations   Influenza Vaccine Annual influenza vaccination during flu season is recommended for all persons aged >= 6 months who do not have contraindications   Pneumococcal Vaccine   * Pneumococcal conjugate vaccine = PCV13 (Prevnar 13), PCV15 (Vaxneuvance), PCV20 (Prevnar 20)  * Pneumococcal polysaccharide vaccine = PPSV23 (Pneumovax) Adults 19-65 yo with certain risk factors or if 65+ yo  If never received any pneumonia vaccine: recommend Prevnar 20 (PCV20)  Give PCV20 if previously received 1 dose of PCV13 or PPSV23   Hepatitis B Vaccine 3 dose series if at intermediate or high risk (ex: diabetes, end stage renal disease, liver disease)   Respiratory syncytial virus (RSV) Vaccine - COVERED BY MEDICARE PART D  * RSVPreF3 (Arexvy) CDC recommends that adults 60 years of age and older may receive a single dose of RSV vaccine using shared clinical decision-making (SCDM)   Tetanus (Td) Vaccine - COST NOT COVERED BY MEDICARE PART B Following completion of primary series, a booster dose should be given every 10 years to maintain immunity against tetanus. Td may also be given as tetanus wound prophylaxis.   Tdap Vaccine - COST NOT COVERED BY MEDICARE PART B Recommended at least once for all adults. For pregnant patients, recommended with each pregnancy.   Shingles Vaccine (Shingrix) - COST NOT COVERED BY MEDICARE PART B  2 shot series recommended in those 19 years and older who have or will have weakened immune systems or those 50 years and older     Health Maintenance Due:      Topic Date Due   • Hepatitis C Screening  Never done   • HIV Screening  Never done   • Cervical Cancer Screening  Never done   • Breast Cancer Screening: Mammogram  Never done   • Colorectal Cancer Screening  Never done   • Lung Cancer Screening  Never done     Immunizations Due:      Topic Date Due   • Pneumococcal Vaccine: Pediatrics (0 to 5 Years) and At-Risk Patients (6 to 64  Years) (1 of 2 - PCV) Never done   • Hepatitis A Vaccine (1 of 2 - Risk 2-dose series) Never done   • Influenza Vaccine (1) 09/01/2024   • COVID-19 Vaccine (1 - 2024-25 season) Never done     Advance Directives   What are advance directives?  Advance directives are legal documents that state your wishes and plans for medical care. These plans are made ahead of time in case you lose your ability to make decisions for yourself. Advance directives can apply to any medical decision, such as the treatments you want, and if you want to donate organs.   What are the types of advance directives?  There are many types of advance directives, and each state has rules about how to use them. You may choose a combination of any of the following:  Living will:  This is a written record of the treatment you want. You can also choose which treatments you do not want, which to limit, and which to stop at a certain time. This includes surgery, medicine, IV fluid, and tube feedings.   Durable power of  for healthcare (DPAHC):  This is a written record that states who you want to make healthcare choices for you when you are unable to make them for yourself. This person, called a proxy, is usually a family member or a friend. You may choose more than 1 proxy.  Do not resuscitate (DNR) order:  A DNR order is used in case your heart stops beating or you stop breathing. It is a request not to have certain forms of treatment, such as CPR. A DNR order may be included in other types of advance directives.  Medical directive:  This covers the care that you want if you are in a coma, near death, or unable to make decisions for yourself. You can list the treatments you want for each condition. Treatment may include pain medicine, surgery, blood transfusions, dialysis, IV or tube feedings, and a ventilator (breathing machine).  Values history:  This document has questions about your views, beliefs, and how you feel and think about life.  This information can help others choose the care that you would choose.  Why are advance directives important?  An advance directive helps you control your care. Although spoken wishes may be used, it is better to have your wishes written down. Spoken wishes can be misunderstood, or not followed. Treatments may be given even if you do not want them. An advance directive may make it easier for your family to make difficult choices about your care.   Cigarette Smoking and Your Health   Risks to your health if you smoke:  Nicotine and other chemicals found in tobacco damage every cell in your body. Even if you are a light smoker, you have an increased risk for cancer, heart disease, and lung disease. If you are pregnant or have diabetes, smoking increases your risk for complications.   Benefits to your health if you stop smoking:   You decrease respiratory symptoms such as coughing, wheezing, and shortness of breath.   You reduce your risk for cancers of the lung, mouth, throat, kidney, bladder, pancreas, stomach, and cervix. If you already have cancer, you increase the benefits of chemotherapy. You also reduce your risk for cancer returning or a second cancer from developing.   You reduce your risk for heart disease, blood clots, heart attack, and stroke.   You reduce your risk for lung infections, and diseases such as pneumonia, asthma, chronic bronchitis, and emphysema.  Your circulation improves. More oxygen can be delivered to your body. If you have diabetes, you lower your risk for complications, such as kidney, artery, and eye diseases. You also lower your risk for nerve damage. Nerve damage can lead to amputations, poor vision, and blindness.  You improve your body's ability to heal and to fight infections.  For more information and support to stop smoking:   [facility]  Phone: 0- [phone]  Web Address: www.[place].gov  Weight Management   Why it is important to manage your weight:  Being overweight  increases your risk of health conditions such as heart disease, high blood pressure, type 2 diabetes, and certain types of cancer. It can also increase your risk for osteoarthritis, sleep apnea, and other respiratory problems. Aim for a slow, steady weight loss. Even a small amount of weight loss can lower your risk of health problems.  How to lose weight safely:  A safe and healthy way to lose weight is to eat fewer calories and get regular exercise. You can lose up about 1 pound a week by decreasing the number of calories you eat by 500 calories each day.   Healthy meal plan for weight management:  A healthy meal plan includes a variety of foods, contains fewer calories, and helps you stay healthy. A healthy meal plan includes the following:  Eat whole-grain foods more often.  A healthy meal plan should contain fiber. Fiber is the part of grains, fruits, and vegetables that is not broken down by your body. Whole-grain foods are healthy and provide extra fiber in your diet. Some examples of whole-grain foods are whole-wheat breads and pastas, oatmeal, brown rice, and bulgur.  Eat a variety of vegetables every day.  Include dark, leafy greens such as spinach, kale, job greens, and mustard greens. Eat yellow and orange vegetables such as carrots, sweet potatoes, and winter squash.   Eat a variety of fruits every day.  Choose fresh or canned fruit (canned in its own juice or light syrup) instead of juice. Fruit juice has very little or no fiber.  Eat low-fat dairy foods.  Drink fat-free (skim) milk or 1% milk. Eat fat-free yogurt and low-fat cottage cheese. Try low-fat cheeses such as mozzarella and other reduced-fat cheeses.  Choose meat and other protein foods that are low in fat.  Choose beans or other legumes such as split peas or lentils. Choose fish, skinless poultry (chicken or turkey), or lean cuts of red meat (beef or pork). Before you cook meat or poultry, cut off any visible fat.   Use less fat and oil.   Try baking foods instead of frying them. Add less fat, such as margarine, sour cream, regular salad dressing and mayonnaise to foods. Eat fewer high-fat foods. Some examples of high-fat foods include french fries, doughnuts, ice cream, and cakes.  Eat fewer sweets.  Limit foods and drinks that are high in sugar. This includes candy, cookies, regular soda, and sweetened drinks.  Exercise:  Exercise at least 30 minutes per day on most days of the week. Some examples of exercise include walking, biking, dancing, and swimming. You can also fit in more physical activity by taking the stairs instead of the elevator or parking farther away from stores. Ask your healthcare provider about the best exercise plan for you.      © Copyright [employer] 2018 Information is for End User's use only and may not be sold, redistributed or otherwise used for commercial purposes. All illustrations and images included in CareNotes® are the copyrighted property of [employer] or [employer]      Patient Education     Type 2 diabetes   The Basics   Written by the doctors and editors at [facility]   What is type 2 diabetes? -- This is a disorder that disrupts the way the body uses sugar. It is sometimes called type 2 diabetes mellitus.  All of the cells in the body need sugar to work normally. Sugar gets into the cells with the help of a hormone called insulin. Insulin is made by the pancreas, an organ in the belly. If there is not enough insulin, or if cells in the body don't respond normally to insulin, sugar builds up in the blood. That is what happens to people with diabetes.  There are 2 different types of diabetes:   In type 1 diabetes, the pancreas makes little or no insulin.   In type 2 diabetes, the pancreas still makes some insulin, but the cells in the body stop responding normally. Eventually, the pancreas cannot make enough insulin to keep up.  Having excess body weight or obesity increases a person's risk of  "developing type 2 diabetes. But people without excess body weight can get diabetes, too.  What are the symptoms of type 2 diabetes? -- Type 2 diabetes usually causes no symptoms. When symptoms do happen, they include:   Needing to urinate often   Intense thirst   Blurry vision  Can diabetes lead to other health problems? -- Yes. Type 2 diabetes might not make you feel sick. But if it is not managed, it can lead to serious problems over time, such as:   Heart attacks   Strokes   Kidney disease   Vision problems (or even blindness)   Pain or loss of feeling in the hands and feet   Needing to have fingers, toes, or other body parts removed (amputated)  How do I know if I have type 2 diabetes? -- Your doctor or nurse can do a blood test. There are 2 tests that can be used for this. Both involve measuring the amount of sugar in your blood, called your \"blood sugar\" or \"blood glucose\":   One of the tests measures your blood sugar at the time the blood sample is taken. This test is done in the morning. You can't eat or drink anything except water for at least 8 hours before the test.   The other test shows what your average blood sugar has been for the past 2 to 3 months. This blood test is called \"hemoglobin A1C\" or just \"A1C.\" It can be checked at any time of the day, even if you have recently eaten.  How is type 2 diabetes treated? -- The goals of treatment are to manage your blood sugar and lower the risk of future problems that can happen in people with diabetes.  Treatment might include:   Lifestyle changes - This is an important part of managing diabetes. It includes eating healthy foods and getting plenty of physical activity.   Medicines - There are a few medicines that help lower blood sugar. Some people need to take pills that help the body make more insulin or that help insulin do its job. Others need insulin shots.  Depending on what medicines you take, you might need to check your blood sugar regularly at " "home. But not everyone with type 2 diabetes needs to do this. Your doctor or nurse will tell you if you should be checking your blood sugar, and when and how to do this.  Sometimes, people with type 2 diabetes also need medicines to help prevent problems caused by the disease. For instance, medicines used to lower blood pressure can reduce the chances of a heart attack or stroke.   General medical care - It's also important to take care of other areas of your health. This includes watching your blood pressure and cholesterol levels. You should also get certain vaccines, such as vaccines to protect against the flu and coronavirus disease 2019 (\"COVID-19\"). Some people also need a vaccine to prevent pneumonia.  Can type 2 diabetes be prevented? -- Yes. To lower your chances of getting type 2 diabetes, the most important thing you can do is eat a healthy diet and get plenty of physical activity. This can help you lose weight if you are overweight. But eating well and being active are also good for your overall health. Even gentle activity, like walking, has benefits.  If you smoke, quitting can also lower your risk of type 2 diabetes. Quitting smoking can be difficult, but your doctor or nurse can help.  All topics are updated as new evidence becomes available and our peer review process is complete.  This topic retrieved from [employer] on: Apr 24, 2024.  Topic 23824 Version 23.0  Release: 32.3.2 - C32.113  © 2024 UpToDate, Inc. and/or its affiliates. All rights reserved.  Consumer Information Use and Disclaimer   Disclaimer: This generalized information is a limited summary of diagnosis, treatment, and/or medication information. It is not meant to be comprehensive and should be used as a tool to help the user understand and/or assess potential diagnostic and treatment options. It does NOT include all information about conditions, treatments, medications, side effects, or risks that may apply to a specific patient. It " is not intended to be medical advice or a substitute for the medical advice, diagnosis, or treatment of a health care provider based on the health care provider's examination and assessment of a patient's specific and unique circumstances. Patients must speak with a health care provider for complete information about their health, medical questions, and treatment options, including any risks or benefits regarding use of medications. This information does not endorse any treatments or medications as safe, effective, or approved for treating a specific patient. UpToDate, Inc. and its affiliates disclaim any warranty or liability relating to this information or the use thereof.The use of this information is governed by the Terms of Use, available at https://www.[employer].com/en/know/clinical-effectiveness-terms. 2024© UpToDate, Inc. and its affiliates and/or licensors. All rights reserved.  Copyright   © 2024 UpToDate, Inc. and/or its affiliates. All rights reserved.    Patient Education     Tratamiento de la diabetes tipo 2   Conceptos Básicos   Redactado por los médicos y editores de [facility]   ¿Cuáles son las metas del tratamiento de la diabetes tipo 2? -- Las metas del tratamiento de la diabetes tipo 2 son:   Manejar murray nivel de azúcar en nicole   Prevenir futuros problemas de slime que pueden tener las personas con diabetes  ¿Cómo se trata la diabetes tipo 2? -- La diabetes tipo 2 se puede tratar con:   Cambios en la dieta   Cambios en el estilo de fabienne   Medicinas  Murray médico o enfermero trabajará con usted para crear el plan de tratamiento indicado.  ¿Qué cambios en la dieta y el estilo de fabienne podrían ser parte del tratamiento? -- Miguel parte del tratamiento, es posible que murray médico o enfermero le recomiende que:   Coma alimentos saludables   Baje de peso, si tiene exceso de peso corporal   Sean bastante actividad física   No fume  Hacer estos cambios de estilo de fabienne es tan importante miguel usar las  "medicinas. También ayudarán a mejorar murray slime en general.  ¿Qué medicinas se usan para tratar la diabetes tipo 2? -- Para tratar la diabetes tipo 2 se usan distintas medicinas. La primera que usa la mayoría de la gente con diabetes tipo 2 es gwen píldora llamada metformina (katharine comercial: Glucophage).  ¿Cómo sé si mi tratamiento funciona? -- Murray médico puede realizar gwen prueba de nicole llamada \"A1C\", la cual indica cuál fue murray nivel promedio de azúcar en nicole en los últimos 2 a 3 meses.  Otra forma de saber si murray tratamiento funciona es revisar usted mismo murray nivel de azúcar en nicole. Muchas personas con diabetes tipo 2 no necesitan hacerlo, nadir algunas sí. Para esto generalmente se usa un dispositivo llamado \"monitor de glucosa en nicole\". Si murray médico le recomienda que lo cheryl, le explicará cómo y cuándo usar el dispositivo.  ¿Qué pasa si mi nivel de azúcar en nicole sigue siendo más alto de lo normal? -- Si murray nivel de azúcar en nicole sigue siendo más alto de lo normal después de usar metformina roby 2 a 3 meses, murray médico podría aumentar la dosis. Si ya está usando la dosis más javi posible, murray médico podría sugerir agregar gwen segunda medicina.  ¿Cuál es la segunda medicina que usaré? -- Existen distintas medicinas que murray médico le puede recetar. La segunda medicina podría ser otra píldora para darline todos los días o gwen inyección que usted mismo se aplica gwen vez por día o gwen vez por semana. La elección depende de distintos factores, desmond cuán elevado sea murray azúcar en nicole, murray peso, nidia otros problemas de slime y si se siente cómodo aplicándose inyecciones.  Algunas de estas medicinas pueden tener el efecto secundario de hacer que el azúcar en nicole baje. Los síntomas pueden incluir:   Sudor y temblores   Hambre   Sentimiento de preocupación  Los niveles bajos de azúcar en nicole deben tratarse rápidamente porque pueden causar un desmayo. Murray médico o enfermero le dirá si murray medicina puede causar " niveles bajos de azúcar en nicole y cómo tratarlos.  ¿Qué es la insulina? -- La insulina es gwen hormona normalmente producida en el páncreas, un órgano ubicado en el área del estómago. Ayuda a que el azúcar ingrese en las células del cuerpo. En la mayoría de las personas que tienen diabetes tipo 2, el cuerpo no responde a la insulina normalmente. Con el tiempo, el páncreas kt de producir suficiente insulina.  La mayoría de las personas con diabetes tipo 2 pueden manejar murray azúcar en nicole con gwen alimentación saludable, actividad física y medicinas. Algunas personas necesitan insulina desmond parte de murray plan de tratamiento.  La insulina podría ser la segunda o la única medicina que usen. Generalmente viene en gwen inyección que la persona se aplica a sí misma (figura 1).  Si murray médico le receta insulina, le dirá:   Qué tipo usar - Existen diferentes tipos de insulina. Algunos tipos actúan más rápido o tienen un efecto más duradero que otros.   Cuánto usar   Cuándo usarla   Cómo aplicarse la inyección   Cuándo revisar murray nivel de azúcar en nicole   Cómo evitar los niveles bajos de azúcar en nicole  Si usa insulina, murray dosis deberá cambiar si se enferma, se somete a gwen cirugía, viaja o come fuera de murray casa. Murray médico o enfermero hablará con usted sobre cuándo y cómo cambiar la dosis.  ¿Qué otros tratamientos es posible que necesite? -- Algunas veces, las personas con diabetes tipo 2 necesitan medicinas para tratar o prevenir otros problemas de slime. Por ejemplo, si tiene presión arterial javi podría usar medicina para bajarla, lo que puede reducir nidia posibilidades de tener un infarto o un accidente cerebrovascular (derrame).  ¿Cuándo jaron consultar al médico o enfermero? -- La mayoría de las personas con diabetes consulta a murray médico o enfermero cada 3 o 4 meses. Sandra estas consultas, el médico o enfermero hablará con usted sobre nidia medicinas y nidia niveles de azúcar en nicole. Si nidia niveles de azúcar en  nicole no son adecuados, es posible que el médico o enfermero cheryl cambios en murray plan de tratamiento.  Ocuparse de la diabetes puede ser difícil, y algunas personas sienten tristeza, estrés o ansiedad. Informe a murray médico o enfermero si tiene dificultades, para que pueda brindarle ayuda.  Todos los artículos se actualizan a medida que se descubre nueva evidencia y culmina nuestro proceso de evaluación por homólogos   Guera artículo se recuperó de UpToDate el: Feb 26, 2024.  Artículo 57759 Versión 11.0.es-419.1  Release: 32.2.4 - C32.56  © 2024 [employer] Todos los derechos reservados.  figura 1: Cómo aplicar gwen inyección de insulina     Para aplicarse insulina usando gwen aguja y gwen jeringa:   Pellizque un poco de piel, e inserte rápidamente la aguja. Siga pellizcando la piel para evitar que la insulina entre en el músculo.  Empuje el émbolo completamente hasta el fondo.  Suelte la piel, y retire la aguja. Si ve nicole o un líquido transparente (insulina) en el lugar donde se aplicó la inyección, presione la nena roby 5 a 8 segundos, nadir no la frote.  Gráfico 56197 Versión 13.0  Exención de responsabilidad y uso de la información del consumidor   Descargo de responsabilidad: esta información generalizada es un resumen limitado de información sobre el diagnóstico, el tratamiento y/o los medicamentos. No pretende ser exhaustiva y se debe utilizar desmond herramienta para ayudar al usuario a comprender y/o evaluar las posibles opciones de diagnóstico y tratamiento. No incluye toda la información sobre afecciones, tratamientos, medicamentos, efectos secundarios o riesgos puedan ser aplicables a un paciente específico. No tiene el propósito de servir desmond recomendación médica ni de sustituir la recomendación médica, el diagnóstico o el tratamiento de un profesional de atención médica que se base en el examen y la evaluación de guera profesional de la slime respecto a las circunstancias específicas y únicas del  paciente. Los pacientes deben hablar con un profesional de atención médica para obtener información completa sobre murray slime, cuestiones médicas y opciones de tratamiento, incluidos los riesgos o los beneficios relacionados con el uso de medicamentos. Esta información no certifica que los tratamientos o medicamentos alfredo seguros, eficaces o estén aprobados para tratar a un paciente específico. [employer] y nidia afiliados renuncian a cualquier garantía o responsabilidad relacionada con esta información o el uso de la misma.El uso de esta información está sujeto a las Condiciones de uso, disponibles en https://www.AboutUs.orger.com/en/know/clinical-effectiveness-terms. 2024© [employer] y nidia afiliados y/o licenciantes. Todos los derechos reservados.  Copyright   © 2024 [employer] Todos los derechos reservados.

## 2024-12-05 NOTE — PROGRESS NOTES
Name: Heidi Pate      : 1960      MRN: [id number]  Encounter Provider: FRANCESCA Morrow  Encounter Date: 2024   Encounter department: [facility] [address]    Assessment & Plan  Encounter for annual wellness exam in Medicare patient         Type 2 diabetes mellitus without complication, without long-term current use of insulin (HCC)  Recent blood work reviewed with patient.  Will initiate Metformin.  Patient educated on how to take medication and possible side effects.  We discussed the importance of controlling blood glucose:  Premeal , even better <110   2hr after a meal <170, even better <140   A1C <7%, even better <6.5%.   We discussed importance of blood pressure control and lipid control to lower the risk for complications.   Encouraged advise to check home blood sugars discussed goals in range of therapy.   Reviewed recommendations of annual eye exams (ophthalmology) on long foot exam (Podiatry)    To repeat blood work in 3 months and return for follow-up.    Lab Results   Component Value Date    HGBA1C 7.2 (H) 2024       Orders:    Hemoglobin A1C; Future    Comprehensive metabolic panel; Future    Albumin / creatinine urine ratio; Future    Blood Glucose Monitoring Suppl (OneTouch Verio Reflect) w/Device KIT; Check blood sugars once daily. Please substitute with appropriate alternative as covered by patient's insurance. Dx: E11.65    glucose blood (OneTouch Verio) test strip; Check blood sugars once daily. Please substitute with appropriate alternative as covered by patient's insurance. Dx: E11.65    OneTouch Delica Lancets 33G MISC; Check blood sugars once daily. Please substitute with appropriate alternative as covered by patient's insurance. Dx: E11.65    metFORMIN (GLUCOPHAGE-XR) 500 mg 24 hr tablet; Take 1 tablet (500 mg total) by mouth daily with dinner    IRIS Diabetic eye exam    Primary hypertension  Blood pressure  managed in office today.  Discussed importance of heart healthy diet, weight management.  To continue on amlodipine as prescribed.       Iron deficiency anemia secondary to inadequate dietary iron intake  Recent CBC reviewed with patient.  Patient has been on daily iron supplements.  Advised to take vitamin C along with iron for better absorption.  Patient does not wish to be referred to hematology at this time.  To continue on iron and vitamin C as prescribed.       Leg edema, left  Discussed importance of restricting sodium intake, increasing hydration, weight management.  To balance ambulation and rest.  To obtain duplex study as ordered.  Orders:    VAS VENOUS DUPLEX -LOWER LIMB UNILATERAL; Future    Vitamin D insufficiency  Recent blood work reviewed with patient.  To start vitamin D with food as prescribed.    Orders:    Cholecalciferol (Vitamin D3) 50 MCG (2000 UT) capsule; Take 1 capsule (2,000 Units total) by mouth daily    Screening mammogram for breast cancer    Orders:    Mammo screening bilateral w 3d and cad; Future       Preventive health issues were discussed with patient, and age appropriate screening tests were ordered as noted in patient's After Visit Summary. Personalized health advice and appropriate referrals for health education or preventive services given if needed, as noted in patient's After Visit Summary.    History of Present Illness     Patient presents to the office accompanied by daughter for Medicare wellness visit.  Patient notes compliance with daily medication.  Has upcoming appointment with OB/GYN, and neurology.  Still needs to make appointment with GI for colon cancer screening.  Patient denies complaints related to chronic issues.  Denies headaches, dizziness, slurred speech, unilateral weakness.  Per patient, has observed increased swelling to left lower  leg.  Denies redness, pain.  Symptoms have been waxing and waning over several weeks.  Denies chest pain, hemoptysis,  palpitations.       Patient Care Team:  FRANCESCA Morrow as PCP - General (Family Medicine)    Review of Systems   Constitutional:  Negative for activity change, appetite change, fatigue and unexpected weight change.   HENT:  Negative for congestion, ear pain, sore throat and trouble swallowing.    Eyes:  Negative for photophobia and visual disturbance.   Respiratory:  Negative for cough, chest tightness, shortness of breath and wheezing.    Cardiovascular:  Positive for leg swelling. Negative for chest pain and palpitations.   Gastrointestinal:  Negative for abdominal pain, blood in stool, nausea and vomiting.   Endocrine: Negative for polydipsia, polyphagia and polyuria.   Genitourinary:  Negative for decreased urine volume, dysuria, frequency, hematuria, pelvic pain and urgency.   Musculoskeletal:  Negative for arthralgias, back pain and myalgias.   Skin:  Negative for color change and rash.   Neurological:  Negative for dizziness, syncope, weakness, light-headedness, numbness and headaches.   Hematological:  Negative for adenopathy. Does not bruise/bleed easily.   Psychiatric/Behavioral:  Negative for dysphoric mood and sleep disturbance. The patient is not nervous/anxious.      Medical History Reviewed by provider this encounter:  Tobacco  Allergies  Meds  Problems  Med Hx  Surg Hx  Fam Hx  Soc   Hx      Annual Wellness Visit Questionnaire   Heidi is here for her Subsequent Wellness visit.     Health Risk Assessment:   Patient rates overall health as good. Patient feels that their physical health rating is same. Patient is satisfied with their life. Eyesight was rated as same. Hearing was rated as same. Patient feels that their emotional and mental health rating is same. Patients states they are never, rarely angry. Patient states they are always unusually tired/fatigued. Pain experienced in the last 7 days has been none. Patient states that she has experienced no weight loss or gain in last 6  months.     Fall Risk Screening:   In the past year, patient has experienced: no history of falling in past year      Urinary Incontinence Screening:   Patient has not leaked urine accidently in the last six months.     Home Safety:  Patient does not have trouble with stairs inside or outside of their home. Patient has working smoke alarms and has working carbon monoxide detector. Home safety hazards include: none.     Nutrition:   Current diet is Regular.     Medications:   Patient is not currently taking any over-the-counter supplements. Patient is able to manage medications.     Activities of Daily Living (ADLs)/Instrumental Activities of Daily Living (IADLs):   Walk and transfer into and out of bed and chair?: Yes  Dress and groom yourself?: Yes    Bathe or shower yourself?: Yes    Feed yourself? Yes  Do your laundry/housekeeping?: Yes  Manage your money, pay your bills and track your expenses?: Yes  Make your own meals?: Yes    Do your own shopping?: Yes    ADL comments: Lives in 2-story home with daughters and     Previous Hospitalizations:   Any hospitalizations or ED visits within the last 12 months?: Yes    How many hospitalizations have you had in the last year?: 1-2    Advance Care Planning:   Living will: No      PREVENTIVE SCREENINGS      Cardiovascular Screening:    General: Screening Not Indicated and History Lipid Disorder      Diabetes Screening:     General: Screening Current      Colorectal Cancer Screening:       Due for: Colonoscopy - High Risk      Breast Cancer Screening:       Due for: Mammogram        Cervical Cancer Screening:      Due for: Cervical Pap Smear      Osteoporosis Screening:    General: Screening Not Indicated      Abdominal Aortic Aneurysm (AAA) Screening:        General: Screening Not Indicated      Lung Cancer Screening:       Due for: Low Dose CT (LDCT)      Hepatitis C Screening:    General: Patient Declines    Screening, Brief Intervention, and Referral to  "Treatment (SBIRT)    Screening  Typical number of drinks in a day: 0  Typical number of drinks in a week: 0  Interpretation: Low risk drinking behavior.    Other Counseling Topics:   Car/seat belt/driving safety, skin self-exam, sunscreen and regular weightbearing exercise and calcium and vitamin D intake.     Social Drivers of Health     Food Insecurity: No Food Insecurity (12/5/2024)    Hunger Vital Sign     Worried About Running Out of Food in the Last Year: Never true     Ran Out of Food in the Last Year: Never true   Transportation Needs: No Transportation Needs (12/5/2024)    PRAPARE - Transportation     Lack of Transportation (Medical): No     Lack of Transportation (Non-Medical): No   Housing Stability: Low Risk  (12/5/2024)    Housing Stability Vital Sign     Unable to Pay for Housing in the Last Year: No     Number of Times Moved in the Last Year: 0     Homeless in the Last Year: No   Utilities: Not At Risk (12/5/2024)    [facility] Utilities     Threatened with loss of utilities: No     No results found.    Objective   /86 (BP Location: Left arm, Patient Position: Sitting)   Pulse 102   Temp 97.6 °F (36.4 °C)   Ht 5' 1\" (1.549 m)   Wt 118 kg (259 lb 12.8 oz)   SpO2 98%   BMI 49.09 kg/m²     Physical Exam  Vitals reviewed.   Constitutional:       General: She is not in acute distress.     Appearance: She is obese. She is not ill-appearing.   HENT:      Head: Normocephalic and atraumatic.      Right Ear: Tympanic membrane, ear canal and external ear normal.      Left Ear: Tympanic membrane, ear canal and external ear normal.      Nose: Nose normal.      Mouth/Throat:      Mouth: Mucous membranes are moist.      Pharynx: Oropharynx is clear.   Eyes:      Conjunctiva/sclera: Conjunctivae normal.      Pupils: Pupils are equal, round, and reactive to light.   Neck:      Vascular: No carotid bruit.   Cardiovascular:      Rate and Rhythm: Normal rate and regular rhythm.      Pulses: Normal pulses. no weak " pulses.           Dorsalis pedis pulses are 2+ on the right side and 2+ on the left side.      Heart sounds: Normal heart sounds. No murmur heard.  Pulmonary:      Effort: Pulmonary effort is normal.      Breath sounds: Normal breath sounds.   Abdominal:      General: Bowel sounds are normal.      Palpations: Abdomen is soft.      Tenderness: There is no abdominal tenderness.   Musculoskeletal:         General: Normal range of motion.      Cervical back: Normal range of motion and neck supple.      Right lower leg: No edema.      Left lower leg: Edema (trace) present.   Feet:      Right foot:      Skin integrity: No ulcer, skin breakdown, erythema, warmth, callus or dry skin.      Left foot:      Skin integrity: No ulcer, skin breakdown, erythema, warmth, callus or dry skin.   Lymphadenopathy:      Cervical: No cervical adenopathy.   Skin:     General: Skin is warm and dry.   Neurological:      General: No focal deficit present.      Mental Status: She is alert and oriented to person, place, and time.   Psychiatric:         Mood and Affect: Mood normal.         Behavior: Behavior normal.         Patient's shoes and socks removed.    Right Foot/Ankle   Right Foot Inspection  Skin Exam: skin normal and skin intact. No dry skin, no warmth, no callus, no erythema, no maceration, no abnormal color, no pre-ulcer, no ulcer and no callus.     Toe Exam: ROM and strength within normal limits.     Sensory   Monofilament testing: intact    Vascular  Capillary refills: < 3 seconds  The right DP pulse is 2+.     Left Foot/Ankle  Left Foot Inspection  Skin Exam: skin normal and skin intact. No dry skin, no warmth, no erythema, no maceration, normal color, no pre-ulcer, no ulcer and no callus.     Toe Exam: ROM and strength within normal limits.     Sensory   Monofilament testing: intact    Vascular  Capillary refills: < 3 seconds  The left DP pulse is 2+.     Assign Risk Category  No deformity present  No loss of protective  sensation  No weak pulses  Risk: 0

## 2024-12-05 NOTE — ASSESSMENT & PLAN NOTE
Recent blood work reviewed with patient.  Will initiate Metformin.  Patient educated on how to take medication and possible side effects.  We discussed the importance of controlling blood glucose:  Premeal , even better <110   2hr after a meal <170, even better <140   A1C <7%, even better <6.5%.   We discussed importance of blood pressure control and lipid control to lower the risk for complications.   Encouraged advise to check home blood sugars discussed goals in range of therapy.   Reviewed recommendations of annual eye exams (ophthalmology) on long foot exam (Podiatry)    To repeat blood work in 3 months and return for follow-up.    Lab Results   Component Value Date    HGBA1C 7.2 (H) 11/19/2024       Orders:    Hemoglobin A1C; Future    Comprehensive metabolic panel; Future    Albumin / creatinine urine ratio; Future    Blood Glucose Monitoring Suppl (OneTouch Verio Reflect) w/Device KIT; Check blood sugars once daily. Please substitute with appropriate alternative as covered by patient's insurance. Dx: E11.65    glucose blood (OneTouch Verio) test strip; Check blood sugars once daily. Please substitute with appropriate alternative as covered by patient's insurance. Dx: E11.65    OneTouch Delica Lancets 33G MISC; Check blood sugars once daily. Please substitute with appropriate alternative as covered by patient's insurance. Dx: E11.65    metFORMIN (GLUCOPHAGE-XR) 500 mg 24 hr tablet; Take 1 tablet (500 mg total) by mouth daily with dinner    IRIS Diabetic eye exam

## 2024-12-05 NOTE — ASSESSMENT & PLAN NOTE
Blood pressure managed in office today.  Discussed importance of heart healthy diet, weight management.  To continue on amlodipine as prescribed.

## 2025-01-15 ENCOUNTER — CONSULT (OUTPATIENT)
Dept: CARDIOLOGY CLINIC | Facility: CLINIC | Age: 65
End: 2025-01-15
Payer: COMMERCIAL

## 2025-01-15 VITALS
SYSTOLIC BLOOD PRESSURE: 150 MMHG | HEIGHT: 61 IN | HEART RATE: 90 BPM | WEIGHT: 255 LBS | BODY MASS INDEX: 48.15 KG/M2 | RESPIRATION RATE: 16 BRPM | OXYGEN SATURATION: 98 % | DIASTOLIC BLOOD PRESSURE: 82 MMHG

## 2025-01-15 DIAGNOSIS — I34.0 NONRHEUMATIC MITRAL VALVE REGURGITATION: ICD-10-CM

## 2025-01-15 DIAGNOSIS — I27.20 PULMONARY HYPERTENSION (HCC): ICD-10-CM

## 2025-01-15 DIAGNOSIS — E66.01 MORBID OBESITY DUE TO EXCESS CALORIES (HCC): ICD-10-CM

## 2025-01-15 DIAGNOSIS — E78.2 MIXED HYPERLIPIDEMIA: ICD-10-CM

## 2025-01-15 DIAGNOSIS — I10 PRIMARY HYPERTENSION: Primary | ICD-10-CM

## 2025-01-15 DIAGNOSIS — I36.1 NONRHEUMATIC TRICUSPID VALVE REGURGITATION: ICD-10-CM

## 2025-01-15 PROCEDURE — 99204 OFFICE O/P NEW MOD 45 MIN: CPT | Performed by: INTERNAL MEDICINE

## 2025-01-15 RX ORDER — LOSARTAN POTASSIUM 25 MG/1
25 TABLET ORAL DAILY
Qty: 30 TABLET | Refills: 5 | Status: SHIPPED | OUTPATIENT
Start: 2025-01-15

## 2025-01-15 NOTE — PROGRESS NOTES
CARDIOLOGY CONSULTATION  [facility]  [address] [place] [id number]  Tel: [phone]      NAME: Heidi Pate  AGE: 64 y.o.  SEX: female  : 1960  MRN: [id number]      Chief Complaint:  Chief Complaint   Patient presents with    Follow-up         History of Present Illness:   64-year-old morbidly obese female who recently started seeing a PCP.  Diagnosed to be having hypertension, hyperlipidemia, mitral regurgitation, tricuspid regurgitation, pulmonary hypertension amongst other issues.  Referred to cardiology    Pt denies chest pain / pressure, SOB, palpitations, lightheadedness, syncope, swelling feet, orthopnea, PND, claudication.    Primary hypertension -  Recent diagnosis.  Taking Amlodipine regularly.  Denies lightheadedness, headache, medication side effects.      Mixed hyperlipidemia -  Recently diagnosed. Taking statin regularly along with diet control.  Denies myalgia.  PCP closely monitoring the blood work.    Mitral regurgitation, tricuspid regurgitation.  Diagnosed by echo Aug 2024    Morbid Obesity -  Trying to lose weight.      Past Medical History:  Past Medical History:   Diagnosis Date    Shoulder fracture          Past Surgical History:  Past Surgical History:   Procedure Laterality Date    CHOLECYSTECTOMY      FOREIGN BODY REMOVAL Left     foot    [place] ARTHROPLASTY GLENOHUMERAL JOINT TOTAL SHOULDER Right 3/3/2021    Procedure: ARTHROPLASTY SHOULDER REVERSE;  Surgeon: Michael Caro DO;  Location: [facility];  Service: Orthopedics    SHOULDER SURGERY Left          Family History:  Family History   Problem Relation Age of Onset    No Known Problems Mother     Lymphoma Father     Breast cancer Sister     Ovarian cancer Sister     No Known Problems Brother     No Known Problems Son     No Known Problems Daughter     No Known Problems Maternal Grandmother     No Known Problems Maternal Grandfather      No Known Problems Paternal Grandmother     No Known Problems Paternal Grandfather     No Known Problems Maternal Aunt     No Known Problems Maternal Uncle     No Known Problems Paternal Aunt     No Known Problems Paternal Uncle     No Known Problems Cousin          Social History:  Social History     Socioeconomic History    Marital status: /Civil Union     Spouse name: None    Number of children: None    Years of education: None    Highest education level: None   Occupational History    None   Tobacco Use    Smoking status: Every Day     Current packs/day: 0.50     Average packs/day: 0.5 packs/day for 47.0 years (23.5 ttl pk-yrs)     Types: Cigarettes     Start date: 1978    Smokeless tobacco: Never   Vaping Use    Vaping status: Never Used   Substance and Sexual Activity    Alcohol use: Not Currently     Comment: occ wine coolers    Drug use: Never    Sexual activity: Yes     Partners: Male   Other Topics Concern    None   Social History Narrative    None     Social Drivers of Health     Financial Resource Strain: Not on file   Food Insecurity: No Food Insecurity (12/5/2024)    Hunger Vital Sign     Worried About Running Out of Food in the Last Year: Never true     Ran Out of Food in the Last Year: Never true   Transportation Needs: No Transportation Needs (12/5/2024)    PRAPARE - Transportation     Lack of Transportation (Medical): No     Lack of Transportation (Non-Medical): No   Physical Activity: Not on file   Stress: Not on file   Social Connections: Not on file   Intimate Partner Violence: Not on file   Housing Stability: Low Risk  (12/5/2024)    Housing Stability Vital Sign     Unable to Pay for Housing in the Last Year: No     Number of Times Moved in the Last Year: 0     Homeless in the Last Year: No         Active Problems:  Patient Active Problem List   Diagnosis    Closed fracture of proximal end of right humerus with routine healing    Morbid obesity (HCC)    Rotator cuff arthropathy, left     Iron deficiency anemia secondary to inadequate dietary iron intake    Hypertensive urgency    Facial droop    SIRS (systemic inflammatory response syndrome) (HCC)    Elevated hemoglobin A1c    Arterial ischemic stroke, chronic    Primary hypertension    Type 2 diabetes mellitus without complication, without long-term current use of insulin (HCC)         The following portions of the patient's history were reviewed and updated as appropriate: past medical history, past surgical history, past family history,  past social history, current medications, allergies and problem list.      Review of Systems:  Constitutional: Denies fever, chills  Eyes: Denies eye redness, eye discharge  ENT: Denies hearing loss, sneezing, nasal discharge, sore throat   Respiratory: Denies cough, expectoration, shortness of breath  Cardiovascular: Denies chest pain, palpitations, lower extremity swelling  Gastrointestinal: Denies abdominal pain, nausea, vomiting, diarrhea  Genito-Urinary: Denies dysuria, incontinence  Musculoskeletal: Denies back pain, joint pain, muscle pain  Neurologic: Denies lightheadedness, syncope, headache, seizures  Endocrine: Denies polydipsia, temperature intolerance  Allergy and Immunology: Denies hives, insect bite sensitivity  Hematological and Lymphatic: Denies bleeding problems, swollen glands   Psychological: Denies depression, suicidal ideation, anxiety, panic  Dermatological: Denies pruritus, rash, skin lesion changes      Vitals:  Vitals:    01/15/25 1119   BP: 150/82   Pulse: 90   Resp: 16   SpO2: 98%       Body mass index is 48.18 kg/m².    Weight (last 2 days)       Date/Time Weight    01/15/25 1119 116 (255)              Physical Examination:  General: Patient is not in acute distress.  Morbidly obese.  Awake, alert, oriented in time, place and person. Responding to commands  Head: Normocephalic. Atraumatic  Eyes: Both pupils normal sized, round and reactive to light. Nonicteric  ENT: Normal external  "ear canals  Neck: Supple. JVP not raised. Trachea central. No thyromegaly  Lungs: Bilateral bronchovascular breath sounds with no crackles or rhonchi  Chest wall: No tenderness  Cardiovascular: RRR. S1 and S2 normal. Grade 3/6 PSMs at apex and LLSB  Gastrointestinal: Abdomen soft, nontender. No guarding or rigidity. Liver and spleen not palpable. Bowel sounds present  Neurologic: Patient is awake, alert, oriented in time, place and person. Responding to commands. Moving all extremities  Integumentary:  No skin rash  Lymphatic: No cervical lymphadenopathy  Back: Symmetric. No CVA tenderness  Extremities: No clubbing, cyanosis or edema      Laboratory Results:  CBC with diff:   Lab Results   Component Value Date    WBC 11.19 (H) 11/19/2024    RBC 5.25 (H) 11/19/2024    HGB 12.5 11/19/2024    HCT 42.4 11/19/2024    MCV 81 (L) 11/19/2024    MCH 23.8 (L) 11/19/2024    RDW 18.7 (H) 11/19/2024     (H) 11/19/2024       CMP:  Lab Results   Component Value Date    CREATININE 0.64 11/19/2024    BUN 11 11/19/2024    K 4.2 11/19/2024     11/19/2024    CO2 25 11/19/2024    ALKPHOS 129 (H) 11/19/2024    ALT 10 11/19/2024    AST 15 11/19/2024       Lab Results   Component Value Date    HGBA1C 7.2 (H) 11/19/2024    MG 2.1 08/25/2024         Lipid Profile:   No results found for: \"CHOL\"  Lab Results   Component Value Date    HDL 52 11/19/2024    HDL 44 (L) 08/25/2024    HDL 51 08/24/2024     Lab Results   Component Value Date    LDLCALC 68 11/19/2024    LDLCALC 110 (H) 08/25/2024    LDLCALC 142 (H) 08/24/2024     Lab Results   Component Value Date    TRIG 146 11/19/2024    TRIG 126 08/25/2024    TRIG 136 08/24/2024       Cardiac testing:   Results for orders placed during the hospital encounter of 08/24/24    Echo complete w/ contrast if indicated    Interpretation Summary    Left Ventricle: Left ventricular cavity size is normal. Wall thickness is normal. The left ventricular ejection fraction is 55%. Systolic function " is normal. Wall motion is normal. Diastolic function is normal.    Right Ventricle: Right ventricular cavity size is normal. Systolic function is normal.    Mitral Valve: There is mild to moderate regurgitation.    Tricuspid Valve: There is moderate regurgitation. The right ventricular systolic pressure is mildly elevated. The estimated right ventricular systolic pressure is 38.00 mmHg.      EKG: Reviewed.  Dated 8/24/2024.  Normal sinus rhythm.  Nonspecific ST abnormalities      Medications:    Current Outpatient Medications:     acetaminophen (TYLENOL) 325 mg tablet, Take 975 mg by mouth every 6 (six) hours as needed for mild pain (last dose 8pm last night), Disp: , Rfl:     amLODIPine (NORVASC) 5 mg tablet, Take 1 tablet (5 mg total) by mouth daily, Disp: 90 tablet, Rfl: 1    ascorbic acid (VITAMIN C) 250 mg tablet, Take 1 tablet (250 mg total) by mouth daily, Disp: 90 tablet, Rfl: 1    aspirin (ECOTRIN LOW STRENGTH) 81 mg EC tablet, Take 1 tablet (81 mg total) by mouth daily, Disp: 30 tablet, Rfl: 0    atorvastatin (LIPITOR) 40 mg tablet, Take 1 tablet (40 mg total) by mouth daily with dinner, Disp: 90 tablet, Rfl: 1    Blood Glucose Monitoring Suppl (OneTouch Verio Reflect) w/Device KIT, Check blood sugars once daily. Please substitute with appropriate alternative as covered by patient's insurance. Dx: E11.65, Disp: 1 kit, Rfl: 0    Cholecalciferol (Vitamin D3) 50 MCG (2000 UT) capsule, Take 1 capsule (2,000 Units total) by mouth daily, Disp: 90 capsule, Rfl: 1    ferrous sulfate 324 (65 Fe) mg, Take 1 tablet (324 mg total) by mouth daily before breakfast, Disp: 90 tablet, Rfl: 1    glucose blood (OneTouch Verio) test strip, Check blood sugars once daily. Please substitute with appropriate alternative as covered by patient's insurance. Dx: E11.65, Disp: 100 each, Rfl: 3    losartan (COZAAR) 25 mg tablet, Take 1 tablet (25 mg total) by mouth daily, Disp: 30 tablet, Rfl: 5    metFORMIN (GLUCOPHAGE-XR) 500 mg 24  hr tablet, Take 1 tablet (500 mg total) by mouth daily with dinner, Disp: 90 tablet, Rfl: 1    OneTouch Delica Lancets 33G MISC, Check blood sugars once daily. Please substitute with appropriate alternative as covered by patient's insurance. Dx: E11.65, Disp: 100 each, Rfl: 3      Allergies:  No Known Allergies      Assessment and Plan:  1. Primary hypertension (Primary)  Continue amlodipine 5 mg daily.  Start losartan 25 mg daily for renal protection    2. Mixed hyperlipidemia  Continue statin 40 mg daily along with diet control.  Her PCP monitor her blood work    3. Morbid obesity due to excess calories (HCC)  Counseled to try to lose weight    4. Nonrheumatic mitral valve regurgitation  5. Nonrheumatic tricuspid valve regurgitation  Valvular lesions discussed.  To be followed up with serial echocardiograms at recommended intervals    6. Pulmonary hypertension (HCC)  Sleep study ordered  - Ambulatory Referral to Sleep Medicine; Future      Recommend aggressive risk factor modification and therapeutic lifestyle changes.  Low-salt, low-calorie, low-fat, low-cholesterol diet with regular exercise and to optimize weight.  I will defer the ordering and monitoring of necessity lab studies to you, but I am available and happy to review and manage any of the data at your request in the future.    Discussed concepts of atherosclerosis, including signs and symptoms of cardiac disease.    Previous studies were reviewed.    Safety measures were reviewed.  Questions were entertained and answered.  Patient was advised to report any problems requiring medical attention.    Follow-up with PCP and appropriate specialist and lab work as discussed.    Return for follow up visit as scheduled or earlier, if needed.  Thank you for allowing me to participate in the care and evaluation of your patient.  Should you have any questions, please feel free to contact me.    Rohit Hampton MD  1/15/2025,11:47 AM

## 2025-01-23 ENCOUNTER — OFFICE VISIT (OUTPATIENT)
Dept: NEUROLOGY | Facility: CLINIC | Age: 65
End: 2025-01-23
Payer: COMMERCIAL

## 2025-01-23 VITALS — HEART RATE: 86 BPM | BODY MASS INDEX: 47.95 KG/M2 | OXYGEN SATURATION: 97 % | WEIGHT: 254 LBS | HEIGHT: 61 IN

## 2025-01-23 DIAGNOSIS — E11.9 TYPE 2 DIABETES MELLITUS WITHOUT COMPLICATION, WITHOUT LONG-TERM CURRENT USE OF INSULIN (HCC): ICD-10-CM

## 2025-01-23 DIAGNOSIS — Z86.73 HISTORY OF LACUNAR CEREBROVASCULAR ACCIDENT (CVA): ICD-10-CM

## 2025-01-23 DIAGNOSIS — Z86.69 HISTORY OF BELL'S PALSY: Primary | ICD-10-CM

## 2025-01-23 DIAGNOSIS — I10 PRIMARY HYPERTENSION: ICD-10-CM

## 2025-01-23 PROCEDURE — 99204 OFFICE O/P NEW MOD 45 MIN: CPT | Performed by: PSYCHIATRY & NEUROLOGY

## 2025-01-23 NOTE — ASSESSMENT & PLAN NOTE
Orders:    Ambulatory Referral to Cardiology; Future  MRI of the brain and carotid ultrasound results reviewed no evidence of any acute stroke she does have chronic lacunar CVA, without any residual deficits she is on an aspirin and statin was advised to continue with same stroke education given to the patient to keep a blood pressure target less than 130/80 LDL less than 70 and hemoglobin A1c less than 6 and to maintain ideal body weight.    She was advised to follow-up with a cardiologist to rule out any cardioembolic etiology of her stroke, to go to the hospital if has any recurrence of strokelike symptoms and call us otherwise to see me back in 6 months or sooner if needed and follow-up with the other physicians.

## 2025-01-23 NOTE — PROGRESS NOTES
Neurology Ambulatory Visit  Name: Heidi Pate       : 1960       MRN: [id number]   Encounter Provider: Darrion Cote MD   Encounter Date: 2025  Encounter department: [facility]      Heidi Pate is a 64 y.o. female.       Assessment & Plan  History of Bell's palsy       Currently resolved MRI of the brain did not show evidence of any acute stroke patient's status post steroid treatment.  She is back to normal.  She was advised to follow-up with an ophthalmologist  History of lacunar cerebrovascular accident (CVA)    Orders:    Ambulatory Referral to Cardiology; Future  MRI of the brain and carotid ultrasound results reviewed no evidence of any acute stroke she does have chronic lacunar CVA, without any residual deficits she is on an aspirin and statin was advised to continue with same stroke education given to the patient to keep a blood pressure target less than 130/80 LDL less than 70 and hemoglobin A1c less than 6 and to maintain ideal body weight.    She was advised to follow-up with a cardiologist to rule out any cardioembolic etiology of her stroke, to go to the hospital if has any recurrence of strokelike symptoms and call us otherwise to see me back in 6 months or sooner if needed and follow-up with the other physicians.  Type 2 diabetes mellitus without complication, without long-term current use of insulin ([facility])    Lab Results   Component Value Date    HGBA1C 7.2 (H) 2024          Management as per family physician advised to keep her hemoglobin A1c less than 6  Primary hypertension         Unable to check patient's blood pressure in the office secondary to her arm size unable to fit in the blood pressure cuff here advised her to follow-up with her family physician and keep target blood pressure less than 130/80    Subjective:  Chief Complaint   Patient presents with    Stroke       HISTORY OF PRESENT ILLNESS  64-year-old female with  past medical history of hypertension, diabetes, who was admitted to [facility] in August 2024 with left facial droop and an MRI scan was negative for a CVA and was found the patient had Bell's palsy and was treated with steroids and is here in follow-up since her discharge she tells me that she is back to normal her facial droop has completely resolved she denies having any vision difficulties no difficulty no taste, she denies having any strokelike symptoms, no vision difficulty no speech difficulty no focal weakness no bowel and bladder incontinence, no other complaints.          Prior Work-up:   MRI: Brain without contrast from 8/25/2024 was reported as no acute infarction mild cerebral and pontine chronic microangiopathic changes and a few scattered chronic lacunar infarctions within the left more than the right caudate and right putamen       Past Medical History:    Past Medical History:   Diagnosis Date    Shoulder fracture      Past Surgical History:   Procedure Laterality Date    CHOLECYSTECTOMY      FOREIGN BODY REMOVAL Left     foot    [place] ARTHROPLASTY GLENOHUMERAL JOINT TOTAL SHOULDER Right 3/3/2021    Procedure: ARTHROPLASTY SHOULDER REVERSE;  Surgeon: Michael Caro DO;  Location: [facility];  Service: Orthopedics    SHOULDER SURGERY Left        Family History:  Family History   Problem Relation Age of Onset    No Known Problems Mother     Lymphoma Father     Breast cancer Sister     Ovarian cancer Sister     No Known Problems Brother     No Known Problems Son     No Known Problems Daughter     No Known Problems Maternal Grandmother     No Known Problems Maternal Grandfather     No Known Problems Paternal Grandmother     No Known Problems Paternal Grandfather     No Known Problems Maternal Aunt     No Known Problems Maternal Uncle     No Known Problems Paternal Aunt     No Known Problems Paternal Uncle     No Known Problems Cousin        Social History:  Social History     Tobacco Use     Smoking status: Every Day     Current packs/day: 0.50     Average packs/day: 0.5 packs/day for 47.1 years (23.5 ttl pk-yrs)     Types: Cigarettes     Start date: 1978    Smokeless tobacco: Never   Vaping Use    Vaping status: Never Used   Substance Use Topics    Alcohol use: Not Currently     Comment: occ wine coolers    Drug use: Never      Living situation:    Work:      Allergies:  No Known Allergies    Medications:    Current Outpatient Medications:     acetaminophen (TYLENOL) 325 mg tablet, Take 975 mg by mouth every 6 (six) hours as needed for mild pain (last dose 8pm last night), Disp: , Rfl:     amLODIPine (NORVASC) 5 mg tablet, Take 1 tablet (5 mg total) by mouth daily, Disp: 90 tablet, Rfl: 1    ascorbic acid (VITAMIN C) 250 mg tablet, Take 1 tablet (250 mg total) by mouth daily, Disp: 90 tablet, Rfl: 1    aspirin (ECOTRIN LOW STRENGTH) 81 mg EC tablet, Take 1 tablet (81 mg total) by mouth daily, Disp: 30 tablet, Rfl: 0    atorvastatin (LIPITOR) 40 mg tablet, Take 1 tablet (40 mg total) by mouth daily with dinner, Disp: 90 tablet, Rfl: 1    Blood Glucose Monitoring Suppl (OneTouch Verio Reflect) w/Device KIT, Check blood sugars once daily. Please substitute with appropriate alternative as covered by patient's insurance. Dx: E11.65, Disp: 1 kit, Rfl: 0    Cholecalciferol (Vitamin D3) 50 MCG (2000 UT) capsule, Take 1 capsule (2,000 Units total) by mouth daily, Disp: 90 capsule, Rfl: 1    ferrous sulfate 324 (65 Fe) mg, Take 1 tablet (324 mg total) by mouth daily before breakfast, Disp: 90 tablet, Rfl: 1    glucose blood (OneTouch Verio) test strip, Check blood sugars once daily. Please substitute with appropriate alternative as covered by patient's insurance. Dx: E11.65, Disp: 100 each, Rfl: 3    losartan (COZAAR) 25 mg tablet, Take 1 tablet (25 mg total) by mouth daily, Disp: 30 tablet, Rfl: 5    metFORMIN (GLUCOPHAGE-XR) 500 mg 24 hr tablet, Take 1 tablet (500 mg total) by mouth daily with dinner, Disp: 90  "tablet, Rfl: 1    OneTouch Delica Lancets 33G MISC, Check blood sugars once daily. Please substitute with appropriate alternative as covered by patient's insurance. Dx: E11.65, Disp: 100 each, Rfl: 3    Objective:  Pulse 86   Ht 5' 1\" (1.549 m)   SpO2 97%   BMI 48.18 kg/m²      Labs  I have reviewed pertinent labs:  CBC:   Lab Results   Component Value Date    WBC 11.19 (H) 11/19/2024    RBC 5.25 (H) 11/19/2024    HGB 12.5 11/19/2024    HCT 42.4 11/19/2024    MCV 81 (L) 11/19/2024     (H) 11/19/2024    MCH 23.8 (L) 11/19/2024    MCHC 29.5 (L) 11/19/2024    RDW 18.7 (H) 11/19/2024    MPV 10.0 11/19/2024    NEUTROABS 7.91 (H) 11/19/2024     CMP:   Lab Results   Component Value Date    SODIUM 139 11/19/2024    K 4.2 11/19/2024     11/19/2024    CO2 25 11/19/2024    AGAP 13 11/19/2024    BUN 11 11/19/2024    CREATININE 0.64 11/19/2024    GLUC 108 08/25/2024    GLUF 145 (H) 11/19/2024    CALCIUM 9.1 11/19/2024    AST 15 11/19/2024    ALT 10 11/19/2024    ALKPHOS 129 (H) 11/19/2024    TP 7.5 11/19/2024    ALB 3.9 11/19/2024    TBILI 0.56 11/19/2024    EGFR 96 11/19/2024     Lipid Profile:   Lab Results   Component Value Date    CHOLESTEROL 149 11/19/2024    HDL 52 11/19/2024    TRIG 146 11/19/2024    LDLCALC 68 11/19/2024    NONHDLC 142 09/12/2018     Hemoglobin A1C/EST AVG Glucose   Lab Results   Component Value Date    HGBA1C 7.2 (H) 11/19/2024     11/19/2024              General Exam  GENERAL APPEARANCE:  No distress, alert, interactive and cooperative.  CARDIOVASCULAR: Warm and well perfused  LUNGS: normal work of breathing on room air  EXTREMITIES: no peripheral edema     Neurologic Exam  MENTAL STATE:  Orientation was normal to time, place and person without aphasia or apraxia. Recent and remote memory was intact.  Attention span and concentration were normal. Language testing was normal for comprehension, repetition, expression, and naming.     CRANIAL NERVES:  CN 2       visual fields full " to confrontation.  CN 3, 4, 6  Pupils round, 4 mm in diameter, equally reactive to light. Lids symmetric; no ptosis. EOMs normal alignment, full range. No nystagmus.  CN 5  Facial sensation intact bilaterally.  CN 7  Normal and symmetric facial strength.   CN 8  Hearing intact to finger rub bilaterally.  CN 9  Palate elevates symmetrically.  CN 11  Normal strength of shoulder shrug and neck turning.  CN 12  Tongue midline, with normal bulk and strength.     MOTOR:  Motor exam was normal. Muscle bulk and tone were normal in both upper and lower extremities. Muscle strength was 5/5 in distal and proximal muscles in both upper and lower extremities. No fasciculations, tremor or other abnormal movements were present.     REFLEXES:  RIGHT UE   LEFT UE  BR:2              BR:2    Biceps:2      Biceps:2    Triceps:2     Triceps:2       RIGHT LLE   LEFT LLE    Knee:2           Knee:2    Ankle:2         Ankle:2    Babinski: toes downgoing to plantar stimulation. No clonus.     SENSORY:  Intact to temperature and vibratory sensation in the upper and lower extremities bilaterally. Cortical function is intact.    COORDINATION:   Coordination exam was normal. In both upper extremities, finger-nose-finger was intact without dysmetria or overshoot.      GAIT:  Gait was normal. Station was stable with a normal base. Gait was stable with a normal arm swing and speed. No Romberg sign was present.      ROS:  Review of Systems  Negative in detail  I have reviewed the past medical history, surgical history, social and family history, current medications, allergies vitals, review of systems, and updated this information as appropriate today.    Administrative Statements   The total amount of time spent with the patient and on chart review and documentation was  40 minutes. Issues addressed during this clinic visit included overall management, medication counseling or monitoring, and counseling and coordination of care.         Darrion  MD Kera

## 2025-01-23 NOTE — ASSESSMENT & PLAN NOTE
Currently resolved MRI of the brain did not show evidence of any acute stroke patient's status post steroid treatment.  She is back to normal.  She was advised to follow-up with an ophthalmologist

## 2025-01-23 NOTE — ASSESSMENT & PLAN NOTE
Lab Results   Component Value Date    HGBA1C 7.2 (H) 11/19/2024          Management as per family physician advised to keep her hemoglobin A1c less than 6

## 2025-02-07 ENCOUNTER — CONSULT (OUTPATIENT)
Age: 65
End: 2025-02-07
Payer: COMMERCIAL

## 2025-02-07 VITALS
SYSTOLIC BLOOD PRESSURE: 138 MMHG | DIASTOLIC BLOOD PRESSURE: 82 MMHG | TEMPERATURE: 97.9 F | HEIGHT: 61 IN | WEIGHT: 254 LBS | HEART RATE: 102 BPM | OXYGEN SATURATION: 97 % | BODY MASS INDEX: 47.95 KG/M2

## 2025-02-07 DIAGNOSIS — I27.20 PULMONARY HYPERTENSION (HCC): Primary | ICD-10-CM

## 2025-02-07 DIAGNOSIS — F17.200 NICOTINE DEPENDENCE WITH CURRENT USE: ICD-10-CM

## 2025-02-07 DIAGNOSIS — E66.01 MORBID OBESITY (HCC): ICD-10-CM

## 2025-02-07 DIAGNOSIS — R06.83 SNORING: ICD-10-CM

## 2025-02-07 PROCEDURE — 99406 BEHAV CHNG SMOKING 3-10 MIN: CPT | Performed by: INTERNAL MEDICINE

## 2025-02-07 PROCEDURE — 99204 OFFICE O/P NEW MOD 45 MIN: CPT | Performed by: INTERNAL MEDICINE

## 2025-02-07 NOTE — PROGRESS NOTES
Consultation - Pulmonary Medicine   Heidi Pate 64 y.o. female MRN: [id number]    Physician Requesting Consult: Rohit Hampton MD    Reason for Consult: Pulmonary Hypertension    Pulmonary hypertension (HCC)  Asymptomatic completely, no pulmonary disease, my screening for sleep apnea was negative although she has obesity and Mallampati 3.  Sleep study was ordered by cardiology but patient was not aware, I asked our checkout people to help scheduling.  Not sure if her pulmonary hypertension is secondary to her mitral regurgitation but in general she is doing fine, no further intervention from pulmonary standpoint.  After sleep study if she has sleep apnea I can follow in the office or probably she can follow-up with sleep medicine since she has no pulmonary disease.  She will discuss with her PCP after the study.  If no sleep apnea then follow-up with cardiology for pulmonary hypertension most likely related to cardiac disease.    Nicotine dependence with current use  Counseled patient for about 6 minutes to quit smoking, she agreed to try nicotine patch, based on smoking about 4 cigarettes/day I believe send milligrams nicotine patches daily is enough.  She can use nicotine lozenges or gum as needed.  We talked about strategies to help quitting.  Patient has no evidence of pulmonary disease, she smoked about 10-pack-year history throughout her life, completely asymptomatic, so I doubt any pulmonary disease to cause the above pulmonary hypertension.    Morbid obesity (HCC)  Encouraged patient to decrease carb intake and exercise as tolerated to lose weight      Return if symptoms worsen or fail to improve.    Diagnoses and all orders for this visit:    Pulmonary hypertension (HCC)  -     Ambulatory Referral to Sleep Medicine    Morbid obesity (HCC)    Nicotine dependence with current use  -     nicotine (NICODERM CQ) 7 mg/24hr TD 24 hr patch; Place 1 patch on the skin over 24 hours every 24  hours    Snoring      ______________________________________________________________________    HPI:    Heidi Pate is a 64 y.o. female who presents for evaluation of pulmonary hypertension and possible sleep apnea.    Patient has no history of pulmonary disease, no history of asthma, denies any complaints of shortness of breath or dyspnea on exertion, denies chest pain or tightness, denies wheezing, denies chronic cough or sputum production, denies fever chills or night sweats, denies seasonal allergies or postnasal drip.  She is not on any inhalers.  She denies GERD or dysphagia or aspiration.  She has hypertension and recently had echocardiogram that showed some pulmonary hypertension and was seen by cardiology and referred for evaluation. cardiology also also spectated sleep apnea and ordered sleep study and referred to sleep.    Patient denies exertional chest pain or dyspnea or lightheadedness.    She has history of lacunar infarct seen on CT scan and she followed by neurology, she is on aspirin and statin.    Patient was not sure whether she has snoring or not but denies sleep choking or awakening, denies restless leg syndrome, denies excessive daytime sleepiness.    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  She smokes 0.25 pack/day since age 18, total 10-12-pack-year history.    Review of Systems:  Review of Systems   Constitutional: Negative.    HENT: Negative.     Eyes: Negative.    Respiratory: Negative.     Cardiovascular: Negative.    Gastrointestinal: Negative.    Endocrine: Negative.    Genitourinary: Negative.    Musculoskeletal: Negative.    Skin: Negative.    Allergic/Immunologic: Negative.    Neurological: Negative.    Hematological: Negative.    Psychiatric/Behavioral: Negative.       Aside from what is mentioned in the HPI, the review of systems otherwise  negative.    Current Medications:    Current Outpatient Medications:     acetaminophen (TYLENOL) 325 mg tablet, Take 975 mg by mouth every 6 (six) hours as needed for mild pain (last dose 8pm last night), Disp: , Rfl:     amLODIPine (NORVASC) 5 mg tablet, Take 1 tablet (5 mg total) by mouth daily, Disp: 90 tablet, Rfl: 1    ascorbic acid (VITAMIN C) 250 mg tablet, Take 1 tablet (250 mg total) by mouth daily, Disp: 90 tablet, Rfl: 1    aspirin (ECOTRIN LOW STRENGTH) 81 mg EC tablet, Take 1 tablet (81 mg total) by mouth daily, Disp: 30 tablet, Rfl: 0    atorvastatin (LIPITOR) 40 mg tablet, Take 1 tablet (40 mg total) by mouth daily with dinner, Disp: 90 tablet, Rfl: 1    Blood Glucose Monitoring Suppl (OneTouch Verio Reflect) w/Device KIT, Check blood sugars once daily. Please substitute with appropriate alternative as covered by patient's insurance. Dx: E11.65, Disp: 1 kit, Rfl: 0    Cholecalciferol (Vitamin D3) 50 MCG (2000 UT) capsule, Take 1 capsule (2,000 Units total) by mouth daily, Disp: 90 capsule, Rfl: 1    ferrous sulfate 324 (65 Fe) mg, Take 1 tablet (324 mg total) by mouth daily before breakfast, Disp: 90 tablet, Rfl: 1    glucose blood (OneTouch Verio) test strip, Check blood sugars once daily. Please substitute with appropriate alternative as covered by patient's insurance. Dx: E11.65, Disp: 100 each, Rfl: 3    losartan (COZAAR) 25 mg tablet, Take 1 tablet (25 mg total) by mouth daily, Disp: 30 tablet, Rfl: 5    metFORMIN (GLUCOPHAGE-XR) 500 mg 24 hr tablet, Take 1 tablet (500 mg total) by mouth daily with dinner, Disp: 90 tablet, Rfl: 1    OneTouch Delica Lancets 33G MISC, Check blood sugars once daily. Please substitute with appropriate alternative as covered by patient's insurance. Dx: E11.65, Disp: 100 each, Rfl: 3    Historical Information   Past Medical History:   Diagnosis Date    Shoulder fracture      Past Surgical History:   Procedure Laterality Date    CHOLECYSTECTOMY      FOREIGN BODY REMOVAL  "Left     foot    CT ARTHROPLASTY GLENOHUMERAL JOINT TOTAL SHOULDER Right 3/3/2021    Procedure: ARTHROPLASTY SHOULDER REVERSE;  Surgeon: Michael Caro DO;  Location: [facility];  Service: Orthopedics    SHOULDER SURGERY Left      Social History   Social History     Tobacco Use   Smoking Status Every Day    Current packs/day: 0.50    Average packs/day: 0.5 packs/day for 47.1 years (23.6 ttl pk-yrs)    Types: Cigarettes    Start date: 1978   Smokeless Tobacco Never       Occupational history:  No occupational exposure    Family History:   Family History   Problem Relation Age of Onset    No Known Problems Mother     Lymphoma Father     Breast cancer Sister     Ovarian cancer Sister     No Known Problems Brother     No Known Problems Son     No Known Problems Daughter     No Known Problems Maternal Grandmother     No Known Problems Maternal Grandfather     No Known Problems Paternal Grandmother     No Known Problems Paternal Grandfather     No Known Problems Maternal Aunt     No Known Problems Maternal Uncle     No Known Problems Paternal Aunt     No Known Problems Paternal Uncle     No Known Problems Cousin          PhysicalExamination:  Vitals:   /82 (BP Location: Left arm, Patient Position: Sitting, Cuff Size: Large)   Pulse 102   Temp 97.9 °F (36.6 °C) (Oral)   Ht 5' 1\" (1.549 m)   Wt 115 kg (254 lb)   SpO2 97%   BMI 47.99 kg/m²     Gen:  Comfortable on room air.  No conversational dyspnea  HEENT:  Conjugate gaze.  sclerae anicteric.  Oropharynx moist  Neck: Trachea is midline. No JVD. No adenopathy  Chest: Equal breath sounds and clear to auscultation bilaterally, no wheezing or crackles, good air movement  Cardiac: S1-S2 regular. no murmur  Abdomen:  benign  Extremities: No edema  Neuro:  Normal speech and mentation      Diagnostic Data:  Labs:  I personally reviewed the most recent laboratory data pertinent to today's visit    Lab Results   Component Value Date    WBC 11.19 (H) 11/19/2024    HGB " "12.5 11/19/2024    HCT 42.4 11/19/2024    MCV 81 (L) 11/19/2024     (H) 11/19/2024     Lab Results   Component Value Date    CALCIUM 9.1 11/19/2024    K 4.2 11/19/2024    CO2 25 11/19/2024     11/19/2024    BUN 11 11/19/2024    CREATININE 0.64 11/19/2024     No results found for: \"IGE\"  Lab Results   Component Value Date    ALT 10 11/19/2024    AST 15 11/19/2024    ALKPHOS 129 (H) 11/19/2024               Other studies:  Echocardiogram: LVEF 55%, normal RV, estimated peak PA pressure 38, mild to moderate MR,      Alejandra Doe MD  "

## 2025-02-07 NOTE — ASSESSMENT & PLAN NOTE
Asymptomatic completely, no pulmonary disease, my screening for sleep apnea was negative although she has obesity and Mallampati 3.  Sleep study was ordered by cardiology but patient was not aware, I asked our checkout people to help scheduling.  Not sure if her pulmonary hypertension is secondary to her mitral regurgitation but in general she is doing fine, no further intervention from pulmonary standpoint.  After sleep study if she has sleep apnea I can follow in the office or probably she can follow-up with sleep medicine since she has no pulmonary disease.  She will discuss with her PCP after the study.  If no sleep apnea then follow-up with cardiology for pulmonary hypertension most likely related to cardiac disease.

## 2025-02-07 NOTE — LETTER
February 7, 2025     FRANCESCA Morrow  [address] [facility] [address]    Patient: Heidi Pate   YOB: 1960   Date of Visit: 2/7/2025       Dear Dr. Faria:    Thank you for referring Heidi Pate to me for evaluation. Below are my notes for this consultation.    If you have questions, please do not hesitate to call me. I look forward to following your patient along with you.         Sincerely,        Alejandra Doe MD        CC: No Recipients    Alejandra Doe MD  2/7/2025 12:22 PM  Sign when Signing Visit      Consultation - Pulmonary Medicine   Heidi Pate 64 y.o. female MRN: [id number]    Physician Requesting Consult: Rohit Hampton MD    Reason for Consult: Pulmonary Hypertension    Pulmonary hypertension (HCC)  Asymptomatic completely, no pulmonary disease, my screening for sleep apnea was negative although she has obesity and Mallampati 3.  Sleep study was ordered by cardiology but patient was not aware, I asked our checkout people to help scheduling.  Not sure if her pulmonary hypertension is secondary to her mitral regurgitation but in general she is doing fine, no further intervention from pulmonary standpoint.  After sleep study if she has sleep apnea I can follow in the office or probably she can follow-up with sleep medicine since she has no pulmonary disease.  She will discuss with her PCP after the study.  If no sleep apnea then follow-up with cardiology for pulmonary hypertension most likely related to cardiac disease.    Nicotine dependence with current use  Counseled patient for about 6 minutes to quit smoking, she agreed to try nicotine patch, based on smoking about 4 cigarettes/day I believe send milligrams nicotine patches daily is enough.  She can use nicotine lozenges or gum as needed.  We talked about strategies to help quitting.  Patient has no evidence of pulmonary disease, she smoked about 10-pack-year history throughout her  life, completely asymptomatic, so I doubt any pulmonary disease to cause the above pulmonary hypertension.    Morbid obesity (HCC)  Encouraged patient to decrease carb intake and exercise as tolerated to lose weight      Return if symptoms worsen or fail to improve.    Diagnoses and all orders for this visit:    Pulmonary hypertension (HCC)  -     Ambulatory Referral to Sleep Medicine    Morbid obesity (HCC)    Nicotine dependence with current use  -     nicotine (NICODERM CQ) 7 mg/24hr TD 24 hr patch; Place 1 patch on the skin over 24 hours every 24 hours    Snoring      ______________________________________________________________________    HPI:    Heidi Pate is a 64 y.o. female who presents for evaluation of pulmonary hypertension and possible sleep apnea.    Patient has no history of pulmonary disease, no history of asthma, denies any complaints of shortness of breath or dyspnea on exertion, denies chest pain or tightness, denies wheezing, denies chronic cough or sputum production, denies fever chills or night sweats, denies seasonal allergies or postnasal drip.  She is not on any inhalers.  She denies GERD or dysphagia or aspiration.  She has hypertension and recently had echocardiogram that showed some pulmonary hypertension and was seen by cardiology and referred for evaluation. cardiology also also spectated sleep apnea and ordered sleep study and referred to sleep.    Patient denies exertional chest pain or dyspnea or lightheadedness.    She has history of lacunar infarct seen on CT scan and she followed by neurology, she is on aspirin and statin.    Patient was not sure whether she has snoring or not but denies sleep choking or awakening, denies restless leg syndrome, denies excessive daytime sleepiness.    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  She smokes 0.25  pack/day since age 18, total 10-12-pack-year history.    Review of Systems:  Review of Systems   Constitutional: Negative.    HENT: Negative.     Eyes: Negative.    Respiratory: Negative.     Cardiovascular: Negative.    Gastrointestinal: Negative.    Endocrine: Negative.    Genitourinary: Negative.    Musculoskeletal: Negative.    Skin: Negative.    Allergic/Immunologic: Negative.    Neurological: Negative.    Hematological: Negative.    Psychiatric/Behavioral: Negative.       Aside from what is mentioned in the HPI, the review of systems otherwise negative.    Current Medications:    Current Outpatient Medications:   •  acetaminophen (TYLENOL) 325 mg tablet, Take 975 mg by mouth every 6 (six) hours as needed for mild pain (last dose 8pm last night), Disp: , Rfl:   •  amLODIPine (NORVASC) 5 mg tablet, Take 1 tablet (5 mg total) by mouth daily, Disp: 90 tablet, Rfl: 1  •  ascorbic acid (VITAMIN C) 250 mg tablet, Take 1 tablet (250 mg total) by mouth daily, Disp: 90 tablet, Rfl: 1  •  aspirin (ECOTRIN LOW STRENGTH) 81 mg EC tablet, Take 1 tablet (81 mg total) by mouth daily, Disp: 30 tablet, Rfl: 0  •  atorvastatin (LIPITOR) 40 mg tablet, Take 1 tablet (40 mg total) by mouth daily with dinner, Disp: 90 tablet, Rfl: 1  •  Blood Glucose Monitoring Suppl (OneTouch Verio Reflect) w/Device KIT, Check blood sugars once daily. Please substitute with appropriate alternative as covered by patient's insurance. Dx: E11.65, Disp: 1 kit, Rfl: 0  •  Cholecalciferol (Vitamin D3) 50 MCG (2000 UT) capsule, Take 1 capsule (2,000 Units total) by mouth daily, Disp: 90 capsule, Rfl: 1  •  ferrous sulfate 324 (65 Fe) mg, Take 1 tablet (324 mg total) by mouth daily before breakfast, Disp: 90 tablet, Rfl: 1  •  glucose blood (OneTouch Verio) test strip, Check blood sugars once daily. Please substitute with appropriate alternative as covered by patient's insurance. Dx: E11.65, Disp: 100 each, Rfl: 3  •  losartan (COZAAR) 25 mg tablet, Take 1  "tablet (25 mg total) by mouth daily, Disp: 30 tablet, Rfl: 5  •  metFORMIN (GLUCOPHAGE-XR) 500 mg 24 hr tablet, Take 1 tablet (500 mg total) by mouth daily with dinner, Disp: 90 tablet, Rfl: 1  •  OneTouch Delica Lancets 33G MISC, Check blood sugars once daily. Please substitute with appropriate alternative as covered by patient's insurance. Dx: E11.65, Disp: 100 each, Rfl: 3    Historical Information  Past Medical History:   Diagnosis Date   • Shoulder fracture      Past Surgical History:   Procedure Laterality Date   • CHOLECYSTECTOMY     • FOREIGN BODY REMOVAL Left     foot   • IN ARTHROPLASTY GLENOHUMERAL JOINT TOTAL SHOULDER Right 3/3/2021    Procedure: ARTHROPLASTY SHOULDER REVERSE;  Surgeon: Michael Caro DO;  Location: [facility];  Service: Orthopedics   • SHOULDER SURGERY Left      Social History  Social History     Tobacco Use   Smoking Status Every Day   • Current packs/day: 0.50   • Average packs/day: 0.5 packs/day for 47.1 years (23.6 ttl pk-yrs)   • Types: Cigarettes   • Start date: 1978   Smokeless Tobacco Never       Occupational history:  No occupational exposure    Family History:   Family History   Problem Relation Age of Onset   • No Known Problems Mother    • Lymphoma Father    • Breast cancer Sister    • Ovarian cancer Sister    • No Known Problems Brother    • No Known Problems Son    • No Known Problems Daughter    • No Known Problems Maternal Grandmother    • No Known Problems Maternal Grandfather    • No Known Problems Paternal Grandmother    • No Known Problems Paternal Grandfather    • No Known Problems Maternal Aunt    • No Known Problems Maternal Uncle    • No Known Problems Paternal Aunt    • No Known Problems Paternal Uncle    • No Known Problems Cousin          PhysicalExamination:  Vitals:   /82 (BP Location: Left arm, Patient Position: Sitting, Cuff Size: Large)   Pulse 102   Temp 97.9 °F (36.6 °C) (Oral)   Ht 5' 1\" (1.549 m)   Wt 115 kg (254 lb)   SpO2 97%   BMI " "47.99 kg/m²     Gen:  Comfortable on room air.  No conversational dyspnea  HEENT:  Conjugate gaze.  sclerae anicteric.  Oropharynx moist  Neck: Trachea is midline. No JVD. No adenopathy  Chest: Equal breath sounds and clear to auscultation bilaterally, no wheezing or crackles, good air movement  Cardiac: S1-S2 regular. no murmur  Abdomen:  benign  Extremities: No edema  Neuro:  Normal speech and mentation      Diagnostic Data:  Labs:  I personally reviewed the most recent laboratory data pertinent to today's visit    Lab Results   Component Value Date    WBC 11.19 (H) 11/19/2024    HGB 12.5 11/19/2024    HCT 42.4 11/19/2024    MCV 81 (L) 11/19/2024     (H) 11/19/2024     Lab Results   Component Value Date    CALCIUM 9.1 11/19/2024    K 4.2 11/19/2024    CO2 25 11/19/2024     11/19/2024    BUN 11 11/19/2024    CREATININE 0.64 11/19/2024     No results found for: \"IGE\"  Lab Results   Component Value Date    ALT 10 11/19/2024    AST 15 11/19/2024    ALKPHOS 129 (H) 11/19/2024               Other studies:  Echocardiogram: LVEF 55%, normal RV, estimated peak PA pressure 38, mild to moderate MR,      Alejandra Doe MD  "

## 2025-02-07 NOTE — ASSESSMENT & PLAN NOTE
Counseled patient for about 6 minutes to quit smoking, she agreed to try nicotine patch, based on smoking about 4 cigarettes/day I believe send milligrams nicotine patches daily is enough.  She can use nicotine lozenges or gum as needed.  We talked about strategies to help quitting.  Patient has no evidence of pulmonary disease, she smoked about 10-pack-year history throughout her life, completely asymptomatic, so I doubt any pulmonary disease to cause the above pulmonary hypertension.

## 2025-02-26 ENCOUNTER — RA CDI HCC (OUTPATIENT)
Dept: OTHER | Facility: HOSPITAL | Age: 65
End: 2025-02-26

## 2025-03-06 ENCOUNTER — OFFICE VISIT (OUTPATIENT)
Dept: FAMILY MEDICINE CLINIC | Facility: CLINIC | Age: 65
End: 2025-03-06
Payer: COMMERCIAL

## 2025-03-06 ENCOUNTER — RESULTS FOLLOW-UP (OUTPATIENT)
Dept: FAMILY MEDICINE CLINIC | Facility: CLINIC | Age: 65
End: 2025-03-06

## 2025-03-06 VITALS
WEIGHT: 254.6 LBS | TEMPERATURE: 98.2 F | SYSTOLIC BLOOD PRESSURE: 132 MMHG | BODY MASS INDEX: 48.07 KG/M2 | HEIGHT: 61 IN | DIASTOLIC BLOOD PRESSURE: 86 MMHG | OXYGEN SATURATION: 97 % | HEART RATE: 98 BPM

## 2025-03-06 DIAGNOSIS — E11.9 TYPE 2 DIABETES MELLITUS WITHOUT COMPLICATION, WITHOUT LONG-TERM CURRENT USE OF INSULIN (HCC): Primary | ICD-10-CM

## 2025-03-06 DIAGNOSIS — I10 PRIMARY HYPERTENSION: ICD-10-CM

## 2025-03-06 DIAGNOSIS — M79.672 LEFT FOOT PAIN: ICD-10-CM

## 2025-03-06 LAB
LEFT EYE DIABETIC RETINOPATHY: ABNORMAL
LEFT EYE IMAGE QUALITY: ABNORMAL
LEFT EYE MACULAR EDEMA: ABNORMAL
LEFT EYE OTHER RETINOPATHY: ABNORMAL
RIGHT EYE DIABETIC RETINOPATHY: ABNORMAL
RIGHT EYE IMAGE QUALITY: ABNORMAL
RIGHT EYE MACULAR EDEMA: ABNORMAL
RIGHT EYE OTHER RETINOPATHY: ABNORMAL
SEVERITY (EYE EXAM): ABNORMAL
SL AMB POCT HEMOGLOBIN AIC: 6.7 (ref ?–6.5)

## 2025-03-06 PROCEDURE — 99214 OFFICE O/P EST MOD 30 MIN: CPT | Performed by: NURSE PRACTITIONER

## 2025-03-06 PROCEDURE — G2211 COMPLEX E/M VISIT ADD ON: HCPCS | Performed by: NURSE PRACTITIONER

## 2025-03-06 PROCEDURE — 83036 HEMOGLOBIN GLYCOSYLATED A1C: CPT | Performed by: NURSE PRACTITIONER

## 2025-03-06 NOTE — PROGRESS NOTES
Name: Heidi Pate      : 1960      MRN: [id number]  Encounter Provider: FRANCESCA Morrow  Encounter Date: 3/6/2025   Encounter department: [facility] [address]  :  Assessment & Plan  Type 2 diabetes mellitus without complication, without long-term current use of insulin ([facility])    Lab Results   Component Value Date    HGBA1C 6.7 (A) 2025     A1c improved from previous reading.  Encourage patient to continue with diet modifications and weight management.  Will continue on current dose of metformin.  Eye exam completed in office today.  Advised to obtain repeat blood work in 4 months.    Orders:    [facility] Diabetic eye exam    POCT hemoglobin A1c    Albumin / creatinine urine ratio; Future    Hemoglobin A1C; Future    Comprehensive metabolic panel; Future    Lipid panel; Future    Primary hypertension  Blood pressure managed in office today.  Patient recently started on losartan by cardiology.  Tolerating medications well.  Encouraged heart healthy diet, diet modifications.  To continue on amlodipine and losartan as prescribed.  Continue follow-up with cardiology as scheduled.         Left foot pain  Advised gentle range of motion exercises/stretches, heat therapy, can use over-the-counter Salonpas or Biofreeze for pain.  To obtain x-ray as ordered.    Orders:    XR foot 3+ vw left; Future           History of Present Illness   Patient presents office for 3-month follow-up.  Patient has been compliant with daily metformin, denies GI side effects from metformin.  Has been attempting to eat healthier and manage weight.  Since last visit, patient established with cardiology, neurology, pulmonology.  Notes compliance with all medications.  Denies chest pain, shortness of breath, palpitations.  Complains of left dorsal foot pain.  Denies recent injury or fall.  States several years ago, had glass removed from same area where pain is located.  Patient denies  "numbness/tingling, inability to bear weight on left foot.      Review of Systems   Constitutional:  Negative for activity change, appetite change and fatigue.   HENT:  Negative for sore throat and trouble swallowing.    Eyes:  Negative for photophobia and visual disturbance.   Respiratory:  Negative for cough and shortness of breath.    Cardiovascular:  Negative for chest pain and palpitations.   Gastrointestinal:  Negative for abdominal pain, blood in stool, nausea and vomiting.   Endocrine: Negative for polydipsia, polyphagia and polyuria.   Genitourinary:  Negative for decreased urine volume.   Musculoskeletal:  Positive for arthralgias. Negative for myalgias.   Neurological:  Negative for dizziness, weakness, light-headedness, numbness and headaches.   Hematological:  Negative for adenopathy. Does not bruise/bleed easily.   Psychiatric/Behavioral:  Negative for confusion. The patient is not nervous/anxious.        Objective   /86 (BP Location: Left arm, Patient Position: Sitting)   Pulse 98   Temp 98.2 °F (36.8 °C)   Ht 5' 1\" (1.549 m)   Wt 115 kg (254 lb 9.6 oz)   SpO2 97%   BMI 48.11 kg/m²      Physical Exam  Vitals reviewed.   Constitutional:       General: She is not in acute distress.     Appearance: She is obese. She is not ill-appearing.   HENT:      Head: Normocephalic and atraumatic.      Right Ear: External ear normal.      Left Ear: External ear normal.      Nose: Nose normal.      Mouth/Throat:      Mouth: Mucous membranes are moist.      Pharynx: Oropharynx is clear.   Eyes:      Pupils: Pupils are equal, round, and reactive to light.   Neck:      Vascular: No carotid bruit.   Cardiovascular:      Rate and Rhythm: Normal rate and regular rhythm.      Pulses: Normal pulses.      Heart sounds: No murmur heard.  Pulmonary:      Effort: Pulmonary effort is normal.      Breath sounds: Normal breath sounds.   Musculoskeletal:      Cervical back: Normal range of motion and neck supple.      " Right lower leg: No edema.      Left lower leg: No edema.      Right foot: Normal.      Left foot: Normal capillary refill. Bony tenderness present. No swelling or deformity. Normal pulse.   Skin:     General: Skin is warm and dry.   Neurological:      General: No focal deficit present.      Mental Status: She is alert and oriented to person, place, and time.   Psychiatric:         Mood and Affect: Mood normal.         Behavior: Behavior normal.

## 2025-03-06 NOTE — ASSESSMENT & PLAN NOTE
Blood pressure managed in office today.  Patient recently started on losartan by cardiology.  Tolerating medications well.  Encouraged heart healthy diet, diet modifications.  To continue on amlodipine and losartan as prescribed.  Continue follow-up with cardiology as scheduled.

## 2025-03-06 NOTE — ASSESSMENT & PLAN NOTE
Lab Results   Component Value Date    HGBA1C 6.7 (A) 03/06/2025     A1c improved from previous reading.  Encourage patient to continue with diet modifications and weight management.  Will continue on current dose of metformin.  Eye exam completed in office today.  Advised to obtain repeat blood work in 4 months.    Orders:    [facility] Diabetic eye exam    POCT hemoglobin A1c    Albumin / creatinine urine ratio; Future    Hemoglobin A1C; Future    Comprehensive metabolic panel; Future    Lipid panel; Future

## 2025-03-18 DIAGNOSIS — Z86.73 ARTERIAL ISCHEMIC STROKE, CHRONIC: ICD-10-CM

## 2025-03-18 RX ORDER — ATORVASTATIN CALCIUM 40 MG/1
40 TABLET, FILM COATED ORAL
Qty: 90 TABLET | Refills: 1 | Status: SHIPPED | OUTPATIENT
Start: 2025-03-18

## 2025-03-18 NOTE — TELEPHONE ENCOUNTER
Reason for call:   [x] Refill   [] Prior Auth  [] Other:     Office:   [x] PCP/Provider - Mera Faria, / Sayra prather  [] Specialty/Provider -     Medication:     atorvastatin (LIPITOR) 40 mg tablet       Dose/Frequency: : Take 1 tablet (40 mg total) by mouth daily with dinner,     Quantity: 90    Pharmacy: [facility] [id number]   Does the patient have enough for 3 days?   [] Yes   [x] No - Send as HP to POD

## 2025-03-28 DIAGNOSIS — I10 UNCONTROLLED HYPERTENSION: ICD-10-CM

## 2025-03-28 RX ORDER — AMLODIPINE BESYLATE 5 MG/1
5 TABLET ORAL DAILY
Qty: 90 TABLET | Refills: 1 | Status: SHIPPED | OUTPATIENT
Start: 2025-03-28

## 2025-03-28 NOTE — TELEPHONE ENCOUNTER
Reason for call:   [x] Refill   [] Prior Auth  [] Other:     Office:   [x] PCP/Provider - Mera Faria   [] Specialty/Provider -     Medication:   amLODIPine (NORVASC) 5 mg tablet     Dose/Frequency: Take 1 tablet (5 mg total) by mouth daily     Quantity: 90    Pharmacy: [id number] - [facility]   Does the patient have enough for 3 days?   [x] Yes   [] No - Send as HP to POD

## 2025-04-04 ENCOUNTER — HOSPITAL ENCOUNTER (EMERGENCY)
Facility: HOSPITAL | Age: 65
Discharge: HOME | End: 2025-04-04
Attending: EMERGENCY MEDICINE
Payer: COMMERCIAL

## 2025-04-04 ENCOUNTER — APPOINTMENT (EMERGENCY)
Dept: CT IMAGING | Facility: HOSPITAL | Age: 65
End: 2025-04-04
Payer: COMMERCIAL

## 2025-04-04 ENCOUNTER — APPOINTMENT (EMERGENCY)
Dept: RADIOLOGY | Facility: HOSPITAL | Age: 65
End: 2025-04-04
Payer: COMMERCIAL

## 2025-04-04 VITALS
WEIGHT: 275.13 LBS | HEIGHT: 61 IN | SYSTOLIC BLOOD PRESSURE: 148 MMHG | TEMPERATURE: 97.6 F | BODY MASS INDEX: 51.95 KG/M2 | OXYGEN SATURATION: 98 % | DIASTOLIC BLOOD PRESSURE: 67 MMHG | HEART RATE: 90 BPM | RESPIRATION RATE: 18 BRPM

## 2025-04-04 DIAGNOSIS — S00.81XA ABRASION OF FACE, INITIAL ENCOUNTER: ICD-10-CM

## 2025-04-04 DIAGNOSIS — S09.90XA INJURY OF HEAD, INITIAL ENCOUNTER: Primary | ICD-10-CM

## 2025-04-04 DIAGNOSIS — S50.01XA CONTUSION OF RIGHT ELBOW, INITIAL ENCOUNTER: ICD-10-CM

## 2025-04-04 PROCEDURE — 70450 CT HEAD/BRAIN W/O DYE: CPT

## 2025-04-04 PROCEDURE — 70486 CT MAXILLOFACIAL W/O DYE: CPT

## 2025-04-04 PROCEDURE — 73070 X-RAY EXAM OF ELBOW: CPT

## 2025-04-04 PROCEDURE — 99284 EMERGENCY DEPT VISIT MOD MDM: CPT | Performed by: EMERGENCY MEDICINE

## 2025-04-04 PROCEDURE — 99284 EMERGENCY DEPT VISIT MOD MDM: CPT

## 2025-04-04 RX ORDER — GINSENG 100 MG
1 CAPSULE ORAL ONCE
Status: COMPLETED | OUTPATIENT
Start: 2025-04-04 | End: 2025-04-04

## 2025-04-04 RX ADMIN — BACITRACIN ZINC 1 LARGE APPLICATION: 500 OINTMENT TOPICAL at 15:06

## 2025-04-04 NOTE — DISCHARGE INSTRUCTIONS
Return for increasing headache vomiting confusion or any problems  Ice to area of soreness  Wash your face in the shower and pat the area of abrasion dry  Can use bacitracin ointment over-the-counter on your face until your abrasions heal.  Follow-up with laryngology if you have problems with your nose, recurrent bleeding or looks crooked  Follow-up with orthopedics for persistent elbow pain  Return any acute problems

## 2025-04-04 NOTE — ED PROVIDER NOTES
Time reflects when diagnosis was documented in both MDM as applicable and the Disposition within this note       Time User Action Codes Description Comment    4/4/2025  4:08 PM Osmani Diaz Add [S09.90XA] Injury of head, initial encounter     4/4/2025  4:09 PM Osmani Diaz Add [S00.81XA] Abrasion of face, initial encounter     4/4/2025  4:09 PM Osmani Diaz Add [S50.01XA] Contusion of right elbow, initial encounter           ED Disposition       ED Disposition   Discharge    Condition   Stable    Date/Time   Fri Apr 4, 2025  4:09 PM    Comment   Heidi Rio discharge to home/self care.                   Assessment & Plan       Medical Decision Making  Amount and/or Complexity of Data Reviewed  Radiology: ordered.    Risk  OTC drugs.      Medical decision making 64-year-old female reports tripped and fell landing on her face no loss of consciousness but significant headache and facial abrasion.  CT showed no facial fractures there was no intercranial bleeding.  Patient had some right elbow pain.  No elbow fracture seen no joint effusion.  Discussed with patient for persistent elbow pain please follow-up with orthopedics may require MRI for further evaluation.  We discussed the possibility of a radial head fracture that was occult.  Discussed with patient if she has increasing headache vomiting or confusion needs to follow-up with us can return to the emergency department.  Discussed with her she has persistent nasal bleeding or nasal deformity she can follow-up with otolaryngology.  We discussed further indications to return.       Medications   bacitracin topical ointment 1 large application (1 large application Topical Given 4/4/25 1506)       ED Risk Strat Scores        X-ray right elbow showed no fracture no dislocation no bony lesions interpreted by me, I reviewed the radiology report and the film independently        CT scan of the brain and facial bones showed no fracture no intercranial  pathology.            SBIRT 22yo+      Flowsheet Row Most Recent Value   Initial Alcohol Screen: US AUDIT-C     1. How often do you have a drink containing alcohol? 0 Filed at: 04/04/2025 1409   2. How many drinks containing alcohol do you have on a typical day you are drinking?  0 Filed at: 04/04/2025 1409   3b. FEMALE Any Age, or MALE 65+: How often do you have 4 or more drinks on one occassion? 0 Filed at: 04/04/2025 1409   Audit-C Score 0 Filed at: 04/04/2025 1409   IZABELA: How many times in the past year have you...    Used an illegal drug or used a prescription medication for non-medical reasons? Never Filed at: 04/04/2025 1409                            History of Present Illness       Chief Complaint   Patient presents with    Fall     Pt BIBA after losing her footing and falling onto grass. Pt landed on face with abrasions on forehead and nose with loose teeth. Also c/o R arm pain.        Past Medical History:   Diagnosis Date    Shoulder fracture       Past Surgical History:   Procedure Laterality Date    CHOLECYSTECTOMY      FOREIGN BODY REMOVAL Left     foot    MT ARTHROPLASTY GLENOHUMERAL JOINT TOTAL SHOULDER Right 3/3/2021    Procedure: ARTHROPLASTY SHOULDER REVERSE;  Surgeon: Michael Caro DO;  Location: [facility];  Service: Orthopedics    SHOULDER SURGERY Left       Family History   Problem Relation Age of Onset    No Known Problems Mother     Lymphoma Father     Breast cancer Sister     Ovarian cancer Sister     No Known Problems Brother     No Known Problems Son     No Known Problems Daughter     No Known Problems Maternal Grandmother     No Known Problems Maternal Grandfather     No Known Problems Paternal Grandmother     No Known Problems Paternal Grandfather     No Known Problems Maternal Aunt     No Known Problems Maternal Uncle     No Known Problems Paternal Aunt     No Known Problems Paternal Uncle     No Known Problems Cousin       Social History     Tobacco Use    Smoking status: Former      Average packs/day: 0.5 packs/day for 47.2 years (23.6 ttl pk-yrs)     Types: Cigarettes     Start date: 1978    Smokeless tobacco: Never   Vaping Use    Vaping status: Never Used   Substance Use Topics    Alcohol use: Not Currently     Comment: occ wine coolers    Drug use: Never      E-Cigarette/Vaping    E-Cigarette Use Never User       E-Cigarette/Vaping Substances    Nicotine No     THC No     CBD No     Flavoring No     Other No     Unknown No       I have reviewed and agree with the history as documented.     HPI  Patient is a 64-year-old female she reports she was walking and lost her footing and fell forward landing on her face.  Patient reports hitting her face directly into the ground.  She complains of abrasions across her face.  She reports some of her anterior teeth are loose.  She denies any loss of consciousness but complains of some headache.  She reports some tenderness and pain in her right elbow.  Denies any neck pain.  Denies any chest or abdominal pain.  Denies any lower extremity injury.  Past medical history cholecystectomy, shoulder surgery  Family history contributory   social history, former smoker no history of drug abuse  Review of Systems   Constitutional:  Negative for fever.   HENT:  Negative for congestion.    Eyes:  Negative for pain and redness.   Respiratory:  Negative for cough and shortness of breath.    Cardiovascular:  Negative for chest pain.   Gastrointestinal:  Negative for abdominal pain and vomiting.   Skin:  Positive for wound.   Neurological:  Positive for headaches.   Reports right elbow pain        Objective       ED Triage Vitals [04/04/25 1407]   Temperature Pulse Blood Pressure Respirations SpO2 Patient Position - Orthostatic VS   97.6 °F (36.4 °C) 90 148/67 18 98 % Lying      Temp Source Heart Rate Source BP Location FiO2 (%) Pain Score    Oral Monitor Left arm -- --      Vitals      Date and Time Temp Pulse SpO2 Resp BP Pain Score FACES Pain Rating User    04/04/25 1407 97.6 °F (36.4 °C) 90 98 % 18 148/67 -- -- JK            Physical Exam  Vitals and nursing note reviewed.   Constitutional:       Appearance: She is well-developed.   HENT:      Head: Normocephalic.      Right Ear: External ear normal.      Left Ear: External ear normal.      Nose: Nose normal.      Mouth/Throat:      Mouth: Mucous membranes are moist.      Pharynx: Oropharynx is clear.   Eyes:      General: Lids are normal.      Extraocular Movements: Extraocular movements intact.      Pupils: Pupils are equal, round, and reactive to light.   Neck:      Comments: Cleared by Nexus criteria no midline tenderness  Cardiovascular:      Rate and Rhythm: Normal rate and regular rhythm.      Pulses: Normal pulses.      Heart sounds: Normal heart sounds.   Pulmonary:      Effort: Pulmonary effort is normal. No respiratory distress.   Abdominal:      General: Abdomen is flat. Bowel sounds are normal.      Tenderness: There is no abdominal tenderness.   Musculoskeletal:         General: No deformity. Normal range of motion.      Cervical back: Normal range of motion and neck supple. No tenderness.      Comments: Full range of motion right elbow, pain with supination pronation distal neurovascular tendon intact, less than 2-second capillary refill   Skin:     General: Skin is warm and dry.      Comments: Facial abrasions on her forehead down her nose along the right naris and just above her right lip, nonsuturable   Neurological:      Mental Status: She is alert and oriented to person, place, and time.   Psychiatric:         Mood and Affect: Mood normal.         Results Reviewed       None            CT head without contrast   Final Interpretation by Kyler Ang MD (04/04 4168)      No intracranial hemorrhage or calvarial fracture.                  Workstation performed: [id number]         CT facial bones without contrast   Final Interpretation by Ward Valadez MD (04/04 1601)       Normal noncontrast CT of the facial bones.               Workstation performed: [id number]         XR elbow 2 views RIGHT   Final Interpretation by Kyler Ang MD (04/04 1435)      No acute osseous abnormality.         Computerized Assisted Algorithm (CAA) may have been used to analyze all applicable images.         Workstation performed: [id number]             Procedures    ED Medication and Procedure Management   Prior to Admission Medications   Prescriptions Last Dose Informant Patient Reported? Taking?   Blood Glucose Monitoring Suppl (OneTouch Verio Reflect) w/Device KIT  Self No No   Sig: Check blood sugars once daily. Please substitute with appropriate alternative as covered by patient's insurance. Dx: E11.65   Cholecalciferol (Vitamin D3) 50 MCG (2000 UT) capsule  Self No No   Sig: Take 1 capsule (2,000 Units total) by mouth daily   OneTouch Delica Lancets 33G MISC  Self No No   Sig: Check blood sugars once daily. Please substitute with appropriate alternative as covered by patient's insurance. Dx: E11.65   acetaminophen (TYLENOL) 325 mg tablet  Self Yes No   Sig: Take 975 mg by mouth every 6 (six) hours as needed for mild pain (last dose 8pm last night)   amLODIPine (NORVASC) 5 mg tablet   No No   Sig: Take 1 tablet (5 mg total) by mouth daily   ascorbic acid (VITAMIN C) 250 mg tablet  Self No No   Sig: Take 1 tablet (250 mg total) by mouth daily   aspirin (ECOTRIN LOW STRENGTH) 81 mg EC tablet  Self No No   Sig: Take 1 tablet (81 mg total) by mouth daily   atorvastatin (LIPITOR) 40 mg tablet   No No   Sig: Take 1 tablet (40 mg total) by mouth daily with dinner   ferrous sulfate 324 (65 Fe) mg  Self No No   Sig: Take 1 tablet (324 mg total) by mouth daily before breakfast   glucose blood (OneTouch Verio) test strip  Self No No   Sig: Check blood sugars once daily. Please substitute with appropriate alternative as covered by patient's insurance. Dx: E11.65   losartan (COZAAR) 25 mg tablet   Self No No   Sig: Take 1 tablet (25 mg total) by mouth daily   metFORMIN (GLUCOPHAGE-XR) 500 mg 24 hr tablet  Self No No   Sig: Take 1 tablet (500 mg total) by mouth daily with dinner   nicotine (NICODERM CQ) 7 mg/24hr TD 24 hr patch   No No   Sig: Place 1 patch on the skin over 24 hours every 24 hours      Facility-Administered Medications: None     Patient's Medications   Discharge Prescriptions    No medications on file     No discharge procedures on file.  ED SEPSIS DOCUMENTATION   Time reflects when diagnosis was documented in both MDM as applicable and the Disposition within this note       Time User Action Codes Description Comment    4/4/2025  4:08 PM Osmani Diaz [S09.90XA] Injury of head, initial encounter     4/4/2025  4:09 PM Osmani Diaz [S00.81XA] Abrasion of face, initial encounter     4/4/2025  4:09 PM Osmani Diaz [S50.01XA] Contusion of right elbow, initial encounter                  Osmani Diaz MD  04/04/25 4070

## 2025-04-16 ENCOUNTER — VBI (OUTPATIENT)
Dept: ADMINISTRATIVE | Facility: OTHER | Age: 65
End: 2025-04-16

## 2025-04-16 NOTE — TELEPHONE ENCOUNTER
04/16/25 7:34 AM     Chart reviewed for Mammogram ; nothing is submitted to the patient's insurance at this time.     Annabelle Leahy   [facility]

## 2025-06-04 DIAGNOSIS — E11.9 TYPE 2 DIABETES MELLITUS WITHOUT COMPLICATION, WITHOUT LONG-TERM CURRENT USE OF INSULIN (HCC): ICD-10-CM

## 2025-06-04 NOTE — TELEPHONE ENCOUNTER
Reason for call:   [x] Refill   [] Prior Auth  [] Other:     Office:   [x] PCP/Provider - Mera Faria  [] Specialty/Provider -     Medication: metFORMIN (GLUCOPHAGE-XR) 500 mg 24 hr tablet     Dose/Frequency:     Take 1 tablet (500 mg total) by mouth daily with dinner       Quantity: 90    Pharmacy: [facility] [id number] [phone]     Local Pharmacy   Does the patient have enough for 3 days?   [x] Yes   [] No - Send as HP to POD    Mail Away Pharmacy   Does the patient have enough for 10 days?   [] Yes   [] No - Send as HP to POD

## 2025-06-05 RX ORDER — METFORMIN HYDROCHLORIDE 500 MG/1
500 TABLET, EXTENDED RELEASE ORAL
Qty: 90 TABLET | Refills: 1 | Status: SHIPPED | OUTPATIENT
Start: 2025-06-05

## 2025-06-29 ENCOUNTER — RA CDI HCC (OUTPATIENT)
Dept: OTHER | Facility: HOSPITAL | Age: 65
End: 2025-06-29

## 2025-07-08 ENCOUNTER — OFFICE VISIT (OUTPATIENT)
Dept: FAMILY MEDICINE CLINIC | Facility: CLINIC | Age: 65
End: 2025-07-08
Payer: COMMERCIAL

## 2025-07-08 VITALS
HEART RATE: 108 BPM | TEMPERATURE: 98 F | HEIGHT: 61 IN | OXYGEN SATURATION: 98 % | WEIGHT: 246.8 LBS | RESPIRATION RATE: 16 BRPM | DIASTOLIC BLOOD PRESSURE: 66 MMHG | SYSTOLIC BLOOD PRESSURE: 134 MMHG | BODY MASS INDEX: 46.6 KG/M2

## 2025-07-08 DIAGNOSIS — I10 PRIMARY HYPERTENSION: ICD-10-CM

## 2025-07-08 DIAGNOSIS — Z12.31 SCREENING MAMMOGRAM FOR BREAST CANCER: ICD-10-CM

## 2025-07-08 DIAGNOSIS — E11.9 TYPE 2 DIABETES MELLITUS WITHOUT COMPLICATION, WITHOUT LONG-TERM CURRENT USE OF INSULIN (HCC): Primary | ICD-10-CM

## 2025-07-08 DIAGNOSIS — Z12.12 SCREENING FOR COLORECTAL CANCER: ICD-10-CM

## 2025-07-08 DIAGNOSIS — Z12.11 SCREENING FOR COLORECTAL CANCER: ICD-10-CM

## 2025-07-08 LAB — SL AMB POCT HEMOGLOBIN AIC: 6.3 (ref ?–6.5)

## 2025-07-08 PROCEDURE — 83036 HEMOGLOBIN GLYCOSYLATED A1C: CPT | Performed by: NURSE PRACTITIONER

## 2025-07-08 PROCEDURE — 99214 OFFICE O/P EST MOD 30 MIN: CPT | Performed by: NURSE PRACTITIONER

## 2025-07-08 PROCEDURE — G2211 COMPLEX E/M VISIT ADD ON: HCPCS | Performed by: NURSE PRACTITIONER

## 2025-07-08 NOTE — ASSESSMENT & PLAN NOTE
Lab Results   Component Value Date    HGBA1C 6.3 07/08/2025     A1c has improved from previous reading.  Encourage patient to continue with diet modifications, physical activity as tolerated.  To continue on metformin as prescribed.  To obtain repeat blood work prior to next visit.    Orders:    POCT hemoglobin A1c    Hemoglobin A1C; Future    Comprehensive metabolic panel; Future    CBC and differential; Future    Albumin / creatinine urine ratio; Future    TSH, 3rd generation with Free T4 reflex; Future    Lipid Panel with Direct LDL reflex; Future

## 2025-07-08 NOTE — PROGRESS NOTES
Name: Heidi Pate      : 1960      MRN: [id number]  Encounter Provider: FRANCESCA Morrow  Encounter Date: 2025   Encounter department: [facility] [address]  :  Assessment & Plan  Type 2 diabetes mellitus without complication, without long-term current use of insulin ([facility])    Lab Results   Component Value Date    HGBA1C 6.3 2025     A1c has improved from previous reading.  Encourage patient to continue with diet modifications, physical activity as tolerated.  To continue on metformin as prescribed.  To obtain repeat blood work prior to next visit.    Orders:    POCT hemoglobin A1c    Hemoglobin A1C; Future    Comprehensive metabolic panel; Future    CBC and differential; Future    Albumin / creatinine urine ratio; Future    TSH, 3rd generation with Free T4 reflex; Future    Lipid Panel with Direct LDL reflex; Future    Primary hypertension  Blood pressure remains controlled.  Per patient, sees cardiology yearly.  To continue on losartan, amlodipine as prescribed.  Encouraged heart healthy diet.  Has already decreased tobacco use.  Encouraged continued cessation.       Screening for colorectal cancer    Orders:    iFOBT (FIT); Future    Screening mammogram for breast cancer    Orders:    Mammo screening bilateral w 3d and cad; Future           History of Present Illness   Patient presents to the office for diabetes follow-up.  Per patient, has been compliant with daily medications.  Denies symptoms related to elevated blood sugars or hypoglycemia.  Has also been monitoring blood pressures at home.  Denies elevated readings.  Patient overall feels well.  Denies chest pain, palpitations, shortness of breath, dizziness.        Review of Systems   Constitutional:  Negative for activity change, appetite change and unexpected weight change.   Eyes:  Negative for photophobia and visual disturbance.   Respiratory:  Negative for chest tightness and  "shortness of breath.    Cardiovascular:  Negative for chest pain and palpitations.   Gastrointestinal:  Negative for abdominal pain, blood in stool, nausea and vomiting.   Endocrine: Negative for polydipsia, polyphagia and polyuria.   Genitourinary:  Negative for decreased urine volume.   Musculoskeletal:  Negative for arthralgias and myalgias.   Skin:  Negative for color change and rash.   Neurological:  Negative for dizziness, weakness and numbness.   Psychiatric/Behavioral:  The patient is not nervous/anxious.        Objective   /66 (BP Location: Right arm, Cuff Size: Thigh)   Pulse (!) 108   Temp 98 °F (36.7 °C)   Resp 16   Ht 5' 1\" (1.549 m)   Wt 112 kg (246 lb 12.8 oz)   SpO2 98%   BMI 46.63 kg/m²      Physical Exam  Vitals reviewed.   Constitutional:       General: She is not in acute distress.     Appearance: She is not ill-appearing.   HENT:      Head: Normocephalic and atraumatic.      Right Ear: External ear normal.      Left Ear: External ear normal.      Nose: Nose normal.      Mouth/Throat:      Mouth: Mucous membranes are moist.      Pharynx: Oropharynx is clear.     Eyes:      Pupils: Pupils are equal, round, and reactive to light.     Neck:      Vascular: No carotid bruit.     Cardiovascular:      Rate and Rhythm: Normal rate and regular rhythm.      Pulses: Normal pulses.      Heart sounds: Normal heart sounds. No murmur heard.  Pulmonary:      Effort: Pulmonary effort is normal.      Breath sounds: Normal breath sounds.     Musculoskeletal:      Cervical back: Normal range of motion and neck supple.      Right lower leg: No edema.      Left lower leg: No edema.     Skin:     General: Skin is warm and dry.     Neurological:      General: No focal deficit present.      Mental Status: She is alert and oriented to person, place, and time.     Psychiatric:         Mood and Affect: Mood normal.         Behavior: Behavior normal.         "

## 2025-07-08 NOTE — ASSESSMENT & PLAN NOTE
Blood pressure remains controlled.  Per patient, sees cardiology yearly.  To continue on losartan, amlodipine as prescribed.  Encouraged heart healthy diet.  Has already decreased tobacco use.  Encouraged continued cessation.

## 2025-07-18 ENCOUNTER — VBI (OUTPATIENT)
Dept: ADMINISTRATIVE | Facility: OTHER | Age: 65
End: 2025-07-18

## 2025-07-18 DIAGNOSIS — I10 PRIMARY HYPERTENSION: ICD-10-CM

## 2025-07-18 RX ORDER — LOSARTAN POTASSIUM 25 MG/1
25 TABLET ORAL DAILY
Qty: 30 TABLET | Refills: 2 | Status: SHIPPED | OUTPATIENT
Start: 2025-07-18

## 2025-07-18 NOTE — TELEPHONE ENCOUNTER
Reason for call:   [x] Refill   [] Prior Auth  [] Other:     Office:   [] PCP/Provider -   [x] Specialty/Provider - Berta    Medication:   Losartan 25 mg, 1 qd, 30    Pharmacy:   [facility] [place]    Local Pharmacy   Does the patient have enough for 3 days?   [x] Yes   [] No - Send as HP to POD    Mail Away Pharmacy   Does the patient have enough for 10 days?   [] Yes   [] No - Send as HP to POD

## 2025-07-18 NOTE — TELEPHONE ENCOUNTER
07/18/25 2:09 PM     Chart reviewed for CRC: Colonoscopy ; nothing is submitted to the patient's insurance at this time.     Annabelle Lehay [facility]

## 2025-07-21 ENCOUNTER — TELEPHONE (OUTPATIENT)
Dept: NEUROLOGY | Facility: CLINIC | Age: 65
End: 2025-07-21

## 2025-07-21 NOTE — TELEPHONE ENCOUNTER
Working from wait list - offered patient a sooner appointment and she declined. She would like to leave appointment in October. Name removed from wait list.

## (undated) DEVICE — HEAVY DUTY TABLE COVER: Brand: CONVERTORS

## (undated) DEVICE — DRAPE EQUIPMENT RF WAND

## (undated) DEVICE — T-MAX DISPOSABLE FACE MASK 8 PER BOX

## (undated) DEVICE — Device

## (undated) DEVICE — DRESSING MEPILEX BORDER 4 X 10 IN

## (undated) DEVICE — DISPOSABLE EQUIPMENT COVER: Brand: SMALL TOWEL DRAPE

## (undated) DEVICE — C-WIRE PAK DOUBLE ENDED ORTHOPAEDIC WIRE, SPADE, .062" (1.57 MM)
Type: IMPLANTABLE DEVICE | Status: NON-FUNCTIONAL
Brand: C-WIRE
Removed: 2021-03-03

## (undated) DEVICE — CHLORAPREP HI-LITE 26ML ORANGE

## (undated) DEVICE — INTENDED FOR TISSUE SEPARATION, AND OTHER PROCEDURES THAT REQUIRE A SHARP SURGICAL BLADE TO PUNCTURE OR CUT.: Brand: BARD-PARKER SAFETY BLADES SIZE 10, STERILE

## (undated) DEVICE — GLOVE SRG BIOGEL 7.5

## (undated) DEVICE — BLADE SAGITTAL 25.6 X 9.5MM

## (undated) DEVICE — HEWSON SUTURE RETRIEVER: Brand: HEWSON SUTURE RETRIEVER

## (undated) DEVICE — PACK MAJOR ORTHO W/SPLITS PBDS

## (undated) DEVICE — PLUMEPEN PRO 10FT

## (undated) DEVICE — SUT VICRYL PLUS 2-0 CTB-1 27 IN VCPB259H

## (undated) DEVICE — THE SIMPULSE SOLO SYSTEM WITH ULTREX RETRACTABLE SPLASH SHIELD TIP: Brand: SIMPULSE SOLO

## (undated) DEVICE — SUT VICRYL PLUS 0 CTB-1 27 IN VCPB260H

## (undated) DEVICE — 3M™ STERI-STRIP™ REINFORCED ADHESIVE SKIN CLOSURES, R1547, 1/2 IN X 4 IN (12 MM X 100 MM), 6 STRIPS/ENVELOPE: Brand: 3M™ STERI-STRIP™

## (undated) DEVICE — HOOD: Brand: FLYTE, SURGICOOL

## (undated) DEVICE — BIPOLAR SEALER 23-113-1 AQM 2.3: Brand: AQUAMANTYS™

## (undated) DEVICE — CEMENT MIXING TOWER

## (undated) DEVICE — DRESSING MEPILEX AG BORDER 4 X 8 IN

## (undated) DEVICE — 3M™ STERI-DRAPE™ U-DRAPE 1015: Brand: STERI-DRAPE™

## (undated) DEVICE — IMPERVIOUS STOCKINETTE: Brand: DEROYAL

## (undated) DEVICE — ARTHROSCOPY FLOOR MAT

## (undated) DEVICE — GLOVE SRG BIOGEL ORTHOPEDIC 7.5

## (undated) DEVICE — INTENT TO BE USED WITH SUTURE MATERIAL FOR TISSUE CLOSURE: Brand: RICHARD-ALLAN®  NEEDLE 1/2 CIRCLE REVERSE CUTTING